# Patient Record
Sex: MALE | Race: WHITE | Employment: OTHER | URBAN - METROPOLITAN AREA
[De-identification: names, ages, dates, MRNs, and addresses within clinical notes are randomized per-mention and may not be internally consistent; named-entity substitution may affect disease eponyms.]

---

## 2017-03-16 ENCOUNTER — TELEPHONE (OUTPATIENT)
Dept: INTERNAL MEDICINE CLINIC | Age: 72
End: 2017-03-16

## 2017-03-16 NOTE — TELEPHONE ENCOUNTER
Econazole Nitrate Cream 1% 85 gram tube The patient is requesting a refill of this and would like this called in to the [facility] on the corner of [address]

## 2017-03-17 RX ORDER — ECONAZOLE NITRATE 10 MG/G
CREAM TOPICAL 2 TIMES DAILY
Qty: 30 G | Refills: 1 | Status: SHIPPED | OUTPATIENT
Start: 2017-03-17 | End: 2019-03-06 | Stop reason: SDUPTHER

## 2017-03-17 RX ORDER — ECONAZOLE NITRATE 10 MG/G
CREAM TOPICAL 2 TIMES DAILY
Qty: 30 G | Refills: 1 | Status: SHIPPED | OUTPATIENT
Start: 2017-03-17 | End: 2017-03-17 | Stop reason: SDUPTHER

## 2017-03-17 NOTE — TELEPHONE ENCOUNTER
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 Pt would like to know if Dr Brianna Hinojosa would send in a new script for   Econazole Nitrate his is almost out. He uses that with Flucinonide together and it helps relieve the itching.

## 2017-03-17 NOTE — TELEPHONE ENCOUNTER
meds refilled. Accidentally sent to [facility] first.  Please call and cancel. Was resent to [place] as requested.

## 2017-03-17 NOTE — TELEPHONE ENCOUNTER
Called [facility] and canceled script for Econazole and pharmacist stated will transfer to [place].

## 2017-08-23 ENCOUNTER — TELEPHONE (OUTPATIENT)
Dept: INTERNAL MEDICINE CLINIC | Age: 72
End: 2017-08-23

## 2017-08-23 NOTE — TELEPHONE ENCOUNTER
Carmen requesting labslip for upcoming appt on 9/6. In addition to whatever Dr. Michele Oliveira wants to order, pt is requesting the following specific labs:    Metabolic  Lipids  C Reactive Protein    If there is a reason Dr. Michele Oliveira does not want these ordered, pt asked that Dr. Michele Oliveira let him know why.

## 2017-08-27 NOTE — TELEPHONE ENCOUNTER
Medicare will not cover CRP or lipid panel. H if he has a problem with this I would hold off getting labs done and we can talk about them at his physical e has no diagnosis that warrants CRP. He is not on cholesterol medication, therefore we can only check his lipids every 5 years. I can only justify checking a BMP. If he is okay with getting this done then I will have a lab slip ready for him to complete prior to his visit.

## 2017-08-29 ENCOUNTER — TELEPHONE (OUTPATIENT)
Dept: INTERNAL MEDICINE CLINIC | Age: 72
End: 2017-08-29

## 2017-08-29 NOTE — TELEPHONE ENCOUNTER
Called pt and informed him Dr Meghan Fabian will discuss further about labs at his visit on 09/6/17. Pt verbalized understanding.

## 2017-08-29 NOTE — TELEPHONE ENCOUNTER
Called pt and explained to him reasoning behind not being able to order the labs for CRP and Lipid. Pt stated he does not agree because his father  of a heart attack at 67years old and pt wants to have these labs as a preventive measure. If ins will not cover these labs how much would they be out of pocket or could pt go to the [place] to have these labs done since he is a .

## 2017-09-06 ENCOUNTER — OFFICE VISIT (OUTPATIENT)
Dept: INTERNAL MEDICINE CLINIC | Age: 72
End: 2017-09-06

## 2017-09-06 VITALS
WEIGHT: 186 LBS | SYSTOLIC BLOOD PRESSURE: 122 MMHG | DIASTOLIC BLOOD PRESSURE: 76 MMHG | BODY MASS INDEX: 26.63 KG/M2 | TEMPERATURE: 98.1 F | OXYGEN SATURATION: 96 % | HEIGHT: 70 IN | HEART RATE: 68 BPM | RESPIRATION RATE: 16 BRPM

## 2017-09-06 DIAGNOSIS — Z71.89 ACP (ADVANCE CARE PLANNING): ICD-10-CM

## 2017-09-06 DIAGNOSIS — Z82.49 FAMILY HISTORY OF CORONARY ARTERY DISEASE: ICD-10-CM

## 2017-09-06 DIAGNOSIS — Z00.00 MEDICARE ANNUAL WELLNESS VISIT, SUBSEQUENT: Primary | ICD-10-CM

## 2017-09-06 DIAGNOSIS — Z13.39 SCREENING FOR ALCOHOLISM: ICD-10-CM

## 2017-09-06 DIAGNOSIS — Z13.31 SCREENING FOR DEPRESSION: ICD-10-CM

## 2017-09-06 NOTE — MR AVS SNAPSHOT
Visit Information Date & Time Provider Department Dept. Phone Encounter #  
 9/6/2017 10:30 AM Lorraine Thompson, [facility] Internal Medicine [phone] [id number] Follow-up Instructions Return in about 1 year (around 9/6/2018), or if symptoms worsen or fail to improve. Upcoming Health Maintenance Date Due  
 GLAUCOMA SCREENING Q2Y 5/12/2017 MEDICARE YEARLY EXAM 7/6/2017 INFLUENZA AGE 9 TO ADULT 8/1/2017 COLONOSCOPY 3/11/2019 DTaP/Tdap/Td series (2 - Td) 4/29/2020 Allergies as of 9/6/2017  Review Complete On: 9/6/2017 By: Alia Mendoza RN Severity Noted Reaction Type Reactions Lamisil [Terbinafine]  11/15/2011    Other (comments) STOMACH ACHE Current Immunizations  Reviewed on 9/6/2017 Name Date DTaP 7/2/2010 12:00 AM  
 Hep A and Hep B Vaccine 7/2/2010 12:00 AM  
 Influenza Vaccine 9/1/2016, 10/12/2015, 10/1/2014, 7/2/2014 12:00 AM  
 Influenza Vaccine Whole 7/30/2011 Pneumococcal Conjugate (PCV-13) 6/15/2015 Pneumococcal Polysaccharide (PPSV-23) 7/5/2016, 3/31/2015 12:00 AM  
 Td 1/17/2007 Tdap 4/29/2010 ZZZ-RETIRED (DO NOT USE) Pneumococcal Vaccine (Unspecified Type) 4/13/2010 Zoster Vaccine, Live 4/13/2010 Reviewed by Alia Mendoza RN on 9/6/2017 at 10:44 AM  
You Were Diagnosed With   
  
 Codes Comments Medicare annual wellness visit, subsequent    -  Primary ICD-10-CM: Z00.00 ICD-9-CM: V70.0 ACP (advance care planning)     ICD-10-CM: Z71.89 ICD-9-CM: V65.49 Screening for alcoholism     ICD-10-CM: Z13.89 ICD-9-CM: V79.1 Screening for depression     ICD-10-CM: Z13.89 ICD-9-CM: V79.0 Family history of coronary artery disease     ICD-10-CM: Z82.49 
ICD-9-CM: V17.3 Vitals BP Pulse Temp Resp Height(growth percentile) Weight(growth percentile) 122/76 68 98.1 °F (36.7 °C) (Oral) 16 5' 10\" (1.778 m) 186 lb (84.4 kg) SpO2 BMI Smoking Status 96% 26.69 kg/m2 Never Smoker BMI and BSA Data Body Mass Index Body Surface Area  
 26.69 kg/m 2 2.04 m 2 Preferred Pharmacy Pharmacy Name Phone [facility] [phone] Your Updated Medication List  
  
   
This list is accurate as of: 9/6/17 11:38 AM.  Always use your most recent med list.  
  
  
  
  
 CENTRUM SILVER PO Take  by mouth.  
  
 econazole nitrate 1 % topical cream  
Commonly known as:  Sharene Maclachlan Apply  to affected area two (2) times a day. OTHER  
TAKES OCCASIONAL OTC ALLERGY MEDICATION  
  
 raNITIdine 75 mg tablet Commonly known as:  ZANTAC Take 75 mg by mouth as needed. We Performed the Following [address] [[id number] [facility]] [place] ANNUAL ALCOHOL SCREEN 15 MIN [id number] [facility]] Follow-up Instructions Return in about 1 year (around 9/6/2018), or if symptoms worsen or fail to improve. Patient Instructions Medicare Wellness Visit, Male The best way to live healthy is to have a healthy lifestyle by eating a well-balanced diet, exercising regularly, limiting alcohol and stopping smoking. Regular physical exams and screening tests are another way to keep healthy. Preventive exams provided by your health care provider can find health problems before they become diseases or illnesses. Preventive services including immunizations, screening tests, monitoring and exams can help you take care of your own health. All people over age 72 should have a pneumovax  and and a prevnar shot to prevent pneumonia. These are once in a lifetime unless you and your provider decide differently. All people over 65 should have a yearly flu shot and a tetanus vaccine every 10 years. Screening for diabetes mellitus with a blood sugar test should be done every year. Glaucoma is a disease of the eye due to increased ocular pressure that can lead to blindness and it should be done every year by an eye professional. 
 
Cardiovascular screening tests that check for elevated lipids (fatty part of blood) which can lead to heart disease and strokes should be done every 5 years. Colorectal screening that evaluates for blood or polyps in your colon should be done yearly as a stool test or every five years as a flexible sigmoidoscope or every 10 years as a colonoscopy up to age 76. Men up to age 76 may need a screening blood test for prostate cancer at certain intervals, depending on their personal and family history. This decision is between the patient and his provider. If you have been a smoker or had family history of abdominal aortic aneurysms, you and your provider may decide to schedule an ultrasound test of your aorta. Hepatitis C screening is also recommended for anyone born between [birth year] through [address]. A shingles vaccine is also recommended once in a lifetime after age 61. Your Medicare Wellness Exam is recommended annually. Here is a list of your current Health Maintenance items with a due date: 
Health Maintenance Due Topic Date Due  Glaucoma Screening   05/12/2017 [facility] Annual Well Visit  07/06/2017  Flu Vaccine  08/01/2017 Introducing [facility]! Romy Simpson introduces [employer] patient portal. Now you can access parts of your medical record, email your doctor's office, and request medication refills online. 1. In your internet browser, go to [email or web address] 2. Click on the First Time User? Click Here link in the Sign In box. You will see the New Member Sign Up page. 3. Enter your [employer] Access Code exactly as it appears below. You will not need to use this code after youve completed the sign-up process. If you do not sign up before the expiration date, you must request a new code. · [employer] Access Code: [email or web address] Expires: 12/5/2017 11:38 AM 
 
4. Enter the last four digits of your Social Security Number (xxxx) and Date of Birth (mm/dd/yyyy) as indicated and click Submit. You will be taken to the next sign-up page. 5. Create a [employer] ID. This will be your [employer] login ID and cannot be changed, so think of one that is secure and easy to remember. 6. Create a [employer] password. You can change your password at any time. 7. Enter your Password Reset Question and Answer. This can be used at a later time if you forget your password. 8. Enter your e-mail address. You will receive e-mail notification when new information is available in [address] 9. Click Sign Up. You can now view and download portions of your medical record. 10. Click the Download Summary menu link to download a portable copy of your medical information. If you have questions, please visit the Frequently Asked Questions section of the [employer] website. Remember, [employer] is NOT to be used for urgent needs. For medical emergencies, dial 911. Now available from your iPhone and Android! Please provide this summary of care documentation to your next provider. Your primary care clinician is listed as Sam Owen. If you have any questions after today's visit, please call [phone].

## 2017-09-06 NOTE — PROGRESS NOTES
Thanh Jones is a 81 y.o. male who was seen in clinic today (9/6/2017) for a full physical.      Assessment & Plan:   Diagnoses and all orders for this visit:    1. Medicare annual wellness visit, subsequent  -     Previous labs reviewed & discussed with him     2. ACP (advance care planning)    3. Screening for alcoholism  -     Annual  Alcohol Screen 15 min ()    4. Screening for depression  -     Depression Screen Annual    5. Family history of coronary artery disease-reviewed in detail with him the screening versus monitoring labs, reviewed his concerns regarding family history for CAD, reviewed his normal stress test from 5 years ago, reviewed his previous labs, declined starting ASA daily, no indication for statin. He will look into the cost of CRP and lipid panel as he is interested in getting these done. Follow-up Disposition:  Return in about 1 year (around 9/6/2018), or if symptoms worsen or fail to improve.        ------------------------------------------------------------------------------------------    Subjective: Annual Wellness Visit- Subsequent Visit    End of Life Planning: This was discussed with him today and he has an advanced directive - a copy has been provided he reports having an updated copy at home and will bring this first records. Reviewed DNR/DNI and patient is not interested. Depression Screen:   PHQ over the last two weeks 9/6/2017   Little interest or pleasure in doing things Not at all   Feeling down, depressed or hopeless Not at all   Total Score PHQ 2 0       Fall Risk:   Fall Risk Assessment, last 12 mths 9/6/2017   Able to walk? Yes   Fall in past 12 months? No       Abuse Screen:  Abuse Screening Questionnaire 9/6/2017   Do you ever feel afraid of your partner? N   Are you in a relationship with someone who physically or mentally threatens you? N   Is it safe for you to go home? Y         Alcohol Risk Factor Screening:   On any occasion during the past 3 months, have you had more than 4 drinks containing alcohol? No  Do you average more than 14 drinks per week? No    Hearing Loss: stable, is s/p ear surgery in the past    Activities of Daily Living:    Requires assistance with: no ADLs  Home contains: handrails and grab bars for his mother  Exercise: very active    Cognition Screen:  Has your family/caregiver stated any concerns about your memory: no  appropriate for age attention/concentration and appropriate safety awareness    Adult Nutrition Screen:  No risk factors noted. Health Maintenance  Daily Aspirin: recommended to start daily 81mg  AAA Screening: n/a (IPPE only)  Glaucoma Screening: Records requested      Immunizations:     Influenza: he will plan on getting it this fall. Tetanus: up to date. Shingles: up to date. Pneumonia: up to date. Cancer screening:    Prostate: reviewed guidelines, UTD- monitored by urologist.  Colon: guidelines reviewed, UTD. Patient Care Team:  Neto Cruz MD as PCP - General (Internal Medicine)  Jacob Walker MD (Urology)  Sam Armijo MD (Hematology and Oncology)  Maggie Estrada MD (Pulmonary Disease)  Jasmyne Mcmahon MD (General Surgery)       The following sections were reviewed & updated as appropriate: PMH, PSH, FH, and SH. Patient Active Problem List    Diagnosis Date Noted    Tinnitus of left ear     GERD (gastroesophageal reflux disease)     Abnormal CT scan of lung 03/24/2015    Painful ejaculation 03/24/2015        Prior to Admission medications    Medication Sig Start Date End Date Taking? Authorizing Provider   econazole nitrate (SPECTAZOLE) 1 % topical cream Apply  to affected area two (2) times a day. 3/17/17  Yes Neto Cruz MD   FOLIC ACID/MULTIVIT-MIN/LUTEIN (CENTRUM SILVER PO) Take  by mouth. Yes Historical Provider   ranitidine (ZANTAC) 75 mg tablet Take 75 mg by mouth as needed.    Yes Historical Provider   OTHER TAKES OCCASIONAL OTC ALLERGY MEDICATION Yes Historical Provider        Allergies   Allergen Reactions    Lamisil [Terbinafine] Other (comments)     STOMACH ACHE         Review of Systems   Constitutional: Negative for malaise/fatigue and weight loss. Respiratory: Negative for cough and shortness of breath. Cardiovascular: Positive for chest pain. Negative for palpitations and leg swelling. Gastrointestinal: Negative for abdominal pain, constipation, diarrhea, heartburn, nausea and vomiting. Genitourinary: Negative for frequency. Musculoskeletal: Negative for joint pain and myalgias. Skin: Negative for rash. Neurological: Negative for tingling, sensory change, focal weakness and headaches. Psychiatric/Behavioral: Negative for depression. The patient is not nervous/anxious and does not have insomnia. Objective:   Physical Exam   Constitutional: No distress. HENT:   Mouth/Throat: Mucous membranes are normal.   Eyes: Conjunctivae are normal. No scleral icterus. Neck: Neck supple. Cardiovascular: Regular rhythm and normal heart sounds. No murmur heard. Pulmonary/Chest: Effort normal and breath sounds normal. He has no wheezes. He has no rales. Abdominal: Soft. Bowel sounds are normal. He exhibits no mass. There is no hepatosplenomegaly. There is no tenderness. Musculoskeletal: He exhibits no edema. Lymphadenopathy:     He has no cervical adenopathy. Skin: No rash noted. Psychiatric: He has a normal mood and affect. His behavior is normal.         Visit Vitals    /76    Pulse 68    Temp 98.1 °F (36.7 °C) (Oral)    Resp 16    Ht 5' 10\" (1.778 m)    Wt 186 lb (84.4 kg)    SpO2 96%    BMI 26.69 kg/m2          Advised him to call back or return to office if symptoms worsen/change/persist.  Discussed expected course/resolution/complications of diagnosis in detail with patient. Medication risks/benefits/costs/interactions/alternatives discussed with patient.   He was given an after visit summary which includes diagnoses, current medications, & vitals. He expressed understanding with the diagnosis and plan.         Collette Beverage, MD

## 2017-09-06 NOTE — PATIENT INSTRUCTIONS

## 2017-09-06 NOTE — ACP (ADVANCE CARE PLANNING)
Advance Care Planning (ACP) Provider Note - Comprehensive     Date of ACP Conversation: 09/06/17  Persons included in Conversation:  patient      Authorized Decision Maker (if patient is incapable of making informed decisions): This person is: Healthcare Agent/Medical Power of  under Advance Directive          General ACP for ALL Patients with Decision Making Capacity:  Importance of advance care planning, including choosing a healthcare agent to communicate patient's healthcare decisions if patient lost the ability to make decisions, such as after a sudden illness or accident  Understanding of the healthcare agent role was assessed and information provided  Opportunity offered to explore how cultural, Congregation, spiritual, or personal beliefs would affect decisions for future care  Exploration of values, goals, and preferences if recovery is not expected, even with continued medical treatment in the event of: Imminent death or severe, permanent brain injury    For Serious or Chronic Illness:  Understanding of CPR, goals and expected outcomes, benefits and burdens discussed. Understanding of medical condition  Information on CPR success rates provided (e.g. for CPR in hospital, survival to d/c at two weeks is 22%, for chronically ill or elderly/frail survival is less than 3%)    Interventions Provided:  Reviewed existing Advance Directive   Recommended communicating the plan and making copies for the healthcare agent, personal physician, and others as appropriate (e.g., health system)  Recommended review of completed ACP document annually or upon change in health status       He has an advanced directive - a copy has been provided but he reports it is no longer reflect his wishes. He has had more updated form and will bring this for his records. Reviewed DNR/DNI and patient is not interested.

## 2017-09-11 ENCOUNTER — TELEPHONE (OUTPATIENT)
Dept: INTERNAL MEDICINE CLINIC | Age: 72
End: 2017-09-11

## 2017-11-03 ENCOUNTER — TELEPHONE (OUTPATIENT)
Dept: INTERNAL MEDICINE CLINIC | Age: 72
End: 2017-11-03

## 2018-02-12 ENCOUNTER — OFFICE VISIT (OUTPATIENT)
Dept: INTERNAL MEDICINE CLINIC | Age: 73
End: 2018-02-12

## 2018-02-12 VITALS
HEART RATE: 68 BPM | DIASTOLIC BLOOD PRESSURE: 70 MMHG | TEMPERATURE: 98 F | WEIGHT: 187 LBS | RESPIRATION RATE: 16 BRPM | HEIGHT: 70 IN | SYSTOLIC BLOOD PRESSURE: 102 MMHG | BODY MASS INDEX: 26.77 KG/M2

## 2018-02-12 DIAGNOSIS — F43.21 GRIEF REACTION: Primary | ICD-10-CM

## 2018-02-12 DIAGNOSIS — N52.9 ERECTILE DYSFUNCTION, UNSPECIFIED ERECTILE DYSFUNCTION TYPE: ICD-10-CM

## 2018-02-12 RX ORDER — TADALAFIL 5 MG/1
TABLET ORAL
Qty: 30 TAB | Refills: 0 | Status: SHIPPED | COMMUNITY
Start: 2018-02-12 | End: 2019-05-29 | Stop reason: SDUPTHER

## 2018-02-12 NOTE — PROGRESS NOTES
Chanelle Bae is a 81 y.o. male who was seen in clinic today (2/12/2018). Assessment & Plan:   Diagnoses and all orders for this visit:    1. Grief reaction- new problem, it is improving, reviewed treatment options with him, reviewed life style changes to help improve mood, no medications. Red flags and expectations were reviewed & discussed with the him. He verbalized understanding. 2. Erectile dysfunction, unspecified erectile dysfunction type- acute on chronic issue, worse now due to #1, will try meds below, will update me if needs Rx.  -     tadalafil (CIALIS) 5 mg tablet; Take 1-4 tablets daily as needed         Follow-up Disposition:  Return if symptoms worsen or fail to improve. Subjective:   Peg Das was seen today for Bereavement Counseling    Mental Health Review  Patient is seen for grief. Mother passed away in Nov '16at 10 years old. He reports was having some chest pain for 6wks but has resolved. He reports it is improving day by day. He reports fullness in his chest, was daily, nothing made it better or worse. No correlation he notices with food or activity. He has been swimming daily during this time w/o any issues. He had been crying daily during this time but has improved. He reports no SOB, arm pain, jaw pain, n/v, or abdominal pain. PHQ-9 reviewed. Also reports ED issues since mother passing. Has only tried medications once. Had good success w/ Viagra but could not climax. Prior to Admission medications    Medication Sig Start Date End Date Taking? Authorizing Provider   ranitidine (ZANTAC) 75 mg tablet Take 75 mg by mouth as needed. Yes Historical Provider   OTHER TAKES OCCASIONAL OTC ALLERGY MEDICATION    Yes Historical Provider   econazole nitrate (SPECTAZOLE) 1 % topical cream Apply  to affected area two (2) times a day. Patient taking differently: Apply  to affected area two (2) times daily as needed.  3/17/17   Julieta Felty, MD   FOLIC ACID/MULTIVIT-MIN/LUTEIN (CENTRUM SILVER PO) Take  by mouth. Historical Provider          Allergies   Allergen Reactions    Lamisil [Terbinafine] Other (comments)     STOMACH ACHE           Review of Systems   Constitutional: Negative for malaise/fatigue and weight loss. Respiratory: Negative for cough and shortness of breath. Gastrointestinal: Negative for abdominal pain, constipation, diarrhea, nausea and vomiting. Psychiatric/Behavioral: Negative for depression. The patient is not nervous/anxious and does not have insomnia. Objective:   Physical Exam   Constitutional: No distress. Cardiovascular: Regular rhythm and normal heart sounds. No murmur heard. Pulmonary/Chest: Effort normal and breath sounds normal. He has no wheezes. He has no rales. Psychiatric: He has a normal mood and affect. His behavior is normal.         Visit Vitals    /70    Pulse 68    Temp 98 °F (36.7 °C) (Oral)    Resp 16    Ht 5' 10\" (1.778 m)    Wt 187 lb (84.8 kg)    BMI 26.83 kg/m2         Disclaimer:  Advised him to call back or return to office if symptoms worsen/change/persist.  Discussed expected course/resolution/complications of diagnosis in detail with patient. Medication risks/benefits/costs/interactions/alternatives discussed with patient. He was given an after visit summary which includes diagnoses, current medications, & vitals. He expressed understanding with the diagnosis and plan. Aspects of this note may have been generated using voice recognition software. Despite editing, there may be some syntax errors.        Priscila Irwin MD

## 2018-02-12 NOTE — PATIENT INSTRUCTIONS
Grief (Actual/Anticipated): Care Instructions  Your Care Instructions    Grief is your emotional reaction to a major loss. The words \"sorrow\" and \"heartache\" often are used to describe feelings of grief. You feel grief when you lose a beloved person, pet, place, or thing. It is also natural to feel grief when you lose a valued way of life, such as a job, marriage, or good health. You may begin to grieve before a loss occurs. You may grieve for a loved one who is sick and dying. Children and adults often feel the pain of loss before a big move or divorce. This type of grief helps you get ready for a loss. Grief is different for each person. There is no \"normal\" or \"expected\" period of time for grieving. Some people adjust to their loss within a couple of months. Others may take 2 years or longer, especially if their lives were changed a lot or if the loss was sudden and shocking. Grieving can cause problems such as headaches, loss of appetite, and trouble with thinking or sleeping. You may withdraw from friends and family and behave in ways that are unusual for you. Grief may cause you to question your beliefs and views about life. Grief is natural and does not require medical treatment. But if you have trouble sleeping, it may help to take sleeping pills for a short time. It may help to talk with people who have been through or are going through similar losses. You may also want to talk to a counselor about your feelings. Talking about your loss, sharing your cares and concerns, and getting support from others are important parts of healthy grieving. Follow-up care is a key part of your treatment and safety. Be sure to make and go to all appointments, and call your doctor if you are having problems. It's also a good idea to know your test results and keep a list of the medicines you take. How can you care for yourself at home? · Get enough sleep. Your mind helps make sense of your life while you sleep. Missing sleep can lead to illness and make it harder for you to deal with your grief. · Eat healthy foods. Try to avoid eating only foods that give you comfort. Ask someone to join you for a meal if you do not like eating alone. Consider taking a multivitamin every day. · Get some exercise every day. Even a walk can help you deal with your grief. Other exercises, such as yoga, can also help you manage stress. · Comfort yourself. Take time to look at photos or use special items that make you feel better. · Stay involved in your life. Do not withdraw from the activities you enjoy. People you know at work, Sikhism, clubs, or other groups can help you get through your period of grief. · Think about joining a support group to help you deal with your grief. There are many support groups to help people recover from grief. When should you call for help? Call 911 anytime you think you may need emergency care. For example, call if:  ? · You feel you cannot stop from hurting yourself or someone else. ? Watch closely for changes in your health, and be sure to contact your doctor if:  ? · You think you may be depressed. ? · You do not get better as expected. Where can you learn more? Go to http://german-zafar.info/. Enter H249 in the search box to learn more about \"Grief (Actual/Anticipated): Care Instructions. \"  Current as of: September 24, 2016  Content Version: 11.4  © 3898-4395 [employer]. Care instructions adapted under license by [employer] (which disclaims liability or warranty for this information). If you have questions about a medical condition or this instruction, always ask your healthcare professional. [address] any warranty or liability for your use of this information.

## 2018-03-23 ENCOUNTER — OFFICE VISIT (OUTPATIENT)
Dept: INTERNAL MEDICINE CLINIC | Age: 73
End: 2018-03-23

## 2018-03-23 VITALS
BODY MASS INDEX: 27.2 KG/M2 | DIASTOLIC BLOOD PRESSURE: 66 MMHG | HEART RATE: 78 BPM | WEIGHT: 190 LBS | OXYGEN SATURATION: 97 % | RESPIRATION RATE: 16 BRPM | HEIGHT: 70 IN | SYSTOLIC BLOOD PRESSURE: 112 MMHG | TEMPERATURE: 97.8 F

## 2018-03-23 DIAGNOSIS — M25.561 ACUTE PAIN OF RIGHT KNEE: Primary | ICD-10-CM

## 2018-03-23 RX ORDER — DICLOFENAC SODIUM 10 MG/G
GEL TOPICAL
COMMUNITY
End: 2021-09-10 | Stop reason: ALTCHOICE

## 2018-03-23 NOTE — PROGRESS NOTES
Doyle Bojorquez is a 72 y.o. male who was seen in clinic today (3/23/2018). Assessment & Plan:   Diagnoses and all orders for this visit:    1. Acute pain of right knee- this is a new problem, symptoms are: not changed, differential dx reviewed with the patient, sounds muscular/tendon more then baker's cyst or articular. Will treat with: ice, NSAIDs, rest.  We reviewed wrapping the ankle instead of the the knee due to ankle flexion seems to make things worse. Red flags were reviewed with the patient to RTC or notify me, expected time course for resolution reviewed. If no changes will consider US vs MRI. Follow-up Disposition: Not on File      Subjective:   Esther Martin was seen today for Knee Pain    He presents do to pain of his right knee that is secondary to no known injury and started a few days ago. No known trauma, but he attributes to excessive gardening/kneeling  Since it started his pain has not changed. He describes the pain as aching and at times numbness going down the back of the leg. It is constant but fluctuating in intensity. He denies weakness, denies numbness, denies paresthesias. No locking up or giving out. Exacerbating factors identifiable by patient are walking, stairs, swimming, and driving. He has tried the following: Voltaren gel & brace. These have been: somewhat effective. No fevers, chills, SOB, palpitations, or LE edema. Prior to Admission medications    Medication Sig Start Date End Date Taking? Authorizing Provider   diclofenac (VOLTAREN) 1 % gel Apply  to affected area four (4) times daily as needed. Yes Historical Provider   econazole nitrate (SPECTAZOLE) 1 % topical cream Apply  to affected area two (2) times a day. Patient taking differently: Apply  to affected area two (2) times daily as needed. 3/17/17  Yes Alice Huynh MD   ranitidine (ZANTAC) 75 mg tablet Take 75 mg by mouth as needed.    Yes Historical Provider   OTHER TAKES OCCASIONAL OTC ALLERGY MEDICATION    Yes Historical Provider   tadalafil (CIALIS) 5 mg tablet Take 1-4 tablets daily as needed 2/12/18   Jose Khalil MD          Allergies   Allergen Reactions    Lamisil [Terbinafine] Other (comments)     STOMACH ACHE           ROS - per HPI        Objective:   Physical Exam   Constitutional: No distress. Musculoskeletal: He exhibits no edema. Right knee: He exhibits normal range of motion, no swelling and no deformity. Tenderness (posterior aspect) found. Right lower leg: He exhibits no tenderness, no swelling and no edema. Skin:   Varicose veins bilaterally         Visit Vitals    /66    Pulse 78    Temp 97.8 °F (36.6 °C) (Oral)    Resp 16    Ht 5' 10\" (1.778 m)    Wt 190 lb (86.2 kg)    SpO2 97%    BMI 27.26 kg/m2         Disclaimer:  Advised him to call back or return to office if symptoms worsen/change/persist.  Discussed expected course/resolution/complications of diagnosis in detail with patient. Medication risks/benefits/costs/interactions/alternatives discussed with patient. He was given an after visit summary which includes diagnoses, current medications, & vitals. He expressed understanding with the diagnosis and plan. Aspects of this note may have been generated using voice recognition software. Despite editing, there may be some syntax errors.        Jose Khalil MD

## 2018-03-30 ENCOUNTER — TELEPHONE (OUTPATIENT)
Dept: INTERNAL MEDICINE CLINIC | Age: 73
End: 2018-03-30

## 2018-03-30 DIAGNOSIS — M25.561 RIGHT KNEE PAIN, UNSPECIFIED CHRONICITY: Primary | ICD-10-CM

## 2018-03-30 NOTE — TELEPHONE ENCOUNTER
Please order US of the leg (will need to talk to radiology about if this is duplex or not).   Not concerned about clot, but favor baker's cyst.  Dx: knee pain

## 2018-03-30 NOTE — TELEPHONE ENCOUNTER
Called pt and notified him through voicemail we have scheduled the pt for a duplex of his right knee for next week Wednesday arrival time 3pm for appt 315 pm. Left pt   Message to return our call letting us know he has received our message.

## 2018-04-02 ENCOUNTER — TELEPHONE (OUTPATIENT)
Dept: INTERNAL MEDICINE CLINIC | Age: 73
End: 2018-04-02

## 2018-04-02 NOTE — TELEPHONE ENCOUNTER
[phone] returning kj's call from last week, pt wants her to know that he received her mess, no need to call back.

## 2018-04-04 ENCOUNTER — DOCUMENTATION ONLY (OUTPATIENT)
Dept: INTERNAL MEDICINE CLINIC | Age: 73
End: 2018-04-04

## 2018-04-04 ENCOUNTER — HOSPITAL ENCOUNTER (OUTPATIENT)
Dept: ULTRASOUND IMAGING | Age: 73
Discharge: HOME | End: 2018-04-04
Attending: INTERNAL MEDICINE
Payer: MEDICARE

## 2018-04-04 DIAGNOSIS — M25.561 RIGHT KNEE PAIN, UNSPECIFIED CHRONICITY: ICD-10-CM

## 2018-04-04 PROCEDURE — 93971 EXTREMITY STUDY: CPT

## 2018-04-04 NOTE — PROGRESS NOTES
Had appt scheduled for 1:15 and has US of knee set up for 3:15. Will readdress f/u after imaging reviewed.

## 2018-04-04 NOTE — PROGRESS NOTES
Call to patient. Agrees with cancelling appointment today until imaging done. Advised we would contact once completed.

## 2018-04-05 ENCOUNTER — TELEPHONE (OUTPATIENT)
Dept: INTERNAL MEDICINE CLINIC | Age: 73
End: 2018-04-05

## 2018-04-05 NOTE — TELEPHONE ENCOUNTER
[phone] can leave mess. Pt had dopple and wants to know what the next step is, should he have physical therapy? He says to let dr Jose Navarrete know that the tech told him that he had great valves!

## 2018-04-05 NOTE — PROGRESS NOTES
Called pt and left a message on his voicemail than his US confirms he has a bakers cyst. And that pt can RTC for a steroid injection into the joint or he can see ortho. Left pt this detailed message.

## 2018-04-06 ENCOUNTER — OFFICE VISIT (OUTPATIENT)
Dept: INTERNAL MEDICINE CLINIC | Age: 73
End: 2018-04-06

## 2018-04-06 VITALS
HEIGHT: 70 IN | BODY MASS INDEX: 27.2 KG/M2 | OXYGEN SATURATION: 96 % | RESPIRATION RATE: 16 BRPM | WEIGHT: 190 LBS | HEART RATE: 68 BPM | SYSTOLIC BLOOD PRESSURE: 112 MMHG | TEMPERATURE: 97.4 F | DIASTOLIC BLOOD PRESSURE: 78 MMHG

## 2018-04-06 DIAGNOSIS — E66.3 OVERWEIGHT (BMI 25.0-29.9): ICD-10-CM

## 2018-04-06 DIAGNOSIS — M25.561 ACUTE PAIN OF RIGHT KNEE: Primary | ICD-10-CM

## 2018-04-06 RX ORDER — METHYLPREDNISOLONE ACETATE 80 MG/ML
80 INJECTION, SUSPENSION INTRA-ARTICULAR; INTRALESIONAL; INTRAMUSCULAR; SOFT TISSUE ONCE
Qty: 1 ML | Refills: 0
Start: 2018-04-06 | End: 2018-04-06

## 2018-04-06 RX ORDER — LIDOCAINE HYDROCHLORIDE 10 MG/ML
1 INJECTION INFILTRATION; PERINEURAL ONCE
Qty: 1 VIAL | Refills: 0
Start: 2018-04-06 | End: 2018-04-06

## 2018-04-06 NOTE — MR AVS SNAPSHOT
[address] [place] [address] 
[phone] Patient: Chad Hawthorne MRN: [id number] ZUP:[id number] Visit Information Date & Time Provider Department Dept. Phone Encounter #  
 4/6/2018 11:00 AM Pilo Mccauley, [facility] Internal Medicine [phone] [id number] Follow-up Instructions Return if symptoms worsen or fail to improve. Your Appointments 9/11/2018  9:30 AM  
PHYSICAL with Pilo Mccauley MD  
[address] [facility] Internal Medicine [address]) Appt Note: CPE - (1yr) [address] [facility] [address]  
[phone] [place] [address] Upcoming Health Maintenance Date Due  
 GLAUCOMA SCREENING Q2Y 7/18/2018 MEDICARE YEARLY EXAM 9/7/2018 COLONOSCOPY 3/11/2019 DTaP/Tdap/Td series (2 - Td) 7/2/2020 Allergies as of 4/6/2018  Review Complete On: 4/6/2018 By: Pilo Mccauley MD  
  
 Severity Noted Reaction Type Reactions Lamisil [Terbinafine]  11/15/2011    Other (comments) STOMACH ACHE Current Immunizations  Reviewed on 4/6/2018 Name Date DTaP 7/2/2010 12:00 AM  
 Hep A and Hep B Vaccine 7/2/2010 12:00 AM  
 Influenza High Dose Vaccine PF 10/12/2017 12:00 AM  
 Influenza Vaccine 9/1/2016, 10/12/2015, 10/1/2014, 7/2/2014 12:00 AM, 7/30/2011 Pneumococcal Conjugate (PCV-13) 6/15/2015 Pneumococcal Polysaccharide (PPSV-23) 7/5/2016, 3/31/2015 12:00 AM  
 Td 1/17/2007 Tdap 4/29/2010 ZZZ-RETIRED (DO NOT USE) Pneumococcal Vaccine (Unspecified Type) 4/13/2010 Zoster Vaccine, Live 4/13/2010 Reviewed by Augustin Murillo RN on 4/6/2018 at 11:06 AM  
You Were Diagnosed With   
  
 Codes Comments Acute pain of right knee    -  Primary ICD-10-CM: M25.561 ICD-9-CM: 719.46 Overweight (BMI 25.0-29. 9)     ICD-10-CM: C95.3 ICD-9-CM: 278.02 Vitals BP Pulse Temp Resp Height(growth percentile) Weight(growth percentile) 112/78 68 97.4 °F (36.3 °C) (Oral) 16 5' 10\" (1.778 m) 190 lb (86.2 kg) SpO2 BMI Smoking Status 96% 27.26 kg/m2 Never Smoker BMI and BSA Data Body Mass Index Body Surface Area  
 27.26 kg/m 2 2.06 m 2 Preferred Pharmacy Pharmacy Name Phone [facility] [phone] Your Updated Medication List  
  
   
This list is accurate as of 4/6/18 11:54 AM.  Always use your most recent med list.  
  
  
  
  
 econazole nitrate 1 % topical cream  
Commonly known as:  Corea Bur Apply  to affected area two (2) times a day. lidocaine 10 mg/mL (1 %) injection Commonly known as:  XYLOCAINE  
1 mL by Intra artICUlar route once for 1 dose. methylPREDNISolone acetate 80 mg/mL injection Commonly known as:  DEPO-Medrol 1 mL by IntraMUSCular route once for 1 dose. OTHER  
TAKES OCCASIONAL OTC ALLERGY MEDICATION  
  
 raNITIdine 75 mg tablet Commonly known as:  ZANTAC Take 75 mg by mouth as needed. tadalafil 5 mg tablet Commonly known as:  CIALIS Take 1-4 tablets daily as needed VOLTAREN 1 % Gel Generic drug:  diclofenac Apply  to affected area four (4) times daily as needed. We Performed the Following METHYLPREDNISOLONE ACETATE INJECTION 40 MG [ [facility]] Comments:  
 Submit quantity per 40 mg injection REFERRAL TO PHYSICAL THERAPY [QKR95 Custom] Follow-up Instructions Return if symptoms worsen or fail to improve. Referral Information Referral ID Referred By Referred To  
  
 [id number] Adria Richardson [facility]   
   [address] [place] [address] Phone: [phone] Fax: [phone] Visits Status Start Date End Date 1 New Request 4/6/18 4/6/19 If your referral has a status of pending review or denied, additional information will be sent to support the outcome of this decision. Patient Instructions Baker's Cyst: Care Instructions Your Care Instructions A Baker's cyst is a swelling behind the knee. It may cause pain or stiffness when you bend your knee or straighten it all the way. Baker's cysts are also called popliteal cysts. If you have arthritis or another condition that is the cause of the Baker's cyst, your doctor may treat that condition. A Baker's cyst may go away on its own. If not, or if it is causing a lot of discomfort, your doctor may drain the fluid that has built up behind the knee. In some cases, a Baker's cyst is removed in surgery. There are things you can do at home, such as staying off your leg, to reduce the swelling and pain. Follow-up care is a key part of your treatment and safety. Be sure to make and go to all appointments, and call your doctor if you are having problems. It's also a good idea to know your test results and keep a list of the medicines you take. How can you care for yourself at home? · Rest your knee as much as possible. · Ask your doctor if you can take an over-the-counter pain medicine, such as acetaminophen (Tylenol), ibuprofen (Advil, Motrin), or naproxen (Aleve). Be safe with medicines. Read and follow all instructions on the label. · Use a cane, a crutch, a walker, or another device if you need help to get around. These can help rest your knees. · If you have an elastic bandage, make sure it is snug but not so tight that your leg is numb, tingles, or swells below the bandage. Ask your doctor if you can loosen the bandage if it is too tight. · Follow your doctor's instructions about how much weight you can put on your knee. · Stay at a healthy weight. Being overweight puts extra strain on your knee. When should you call for help? Call 911 anytime you think you may need emergency care. For example, call if: 
? · You have chest pain, are short of breath, or you cough up blood. ?Call your doctor now or seek immediate medical care if: 
? · You have new or worse pain. ? · Your foot is cool or pale or changes color. ? · You have tingling, weakness, or numbness in your foot or toes. ? · You have signs of a blood clot in your leg (called a deep vein thrombosis), such as: 
¨ Pain in your calf, back of the knee, thigh, or groin. ¨ Redness or swelling in your leg. ? Watch closely for changes in your health, and be sure to contact your doctor if: 
? · You do not get better as expected. Where can you learn more? Go to http://german-zafar.info/. Enter N938 in the search box to learn more about \"Baker's Cyst: Care Instructions. \" Current as of: March 21, 2017 Content Version: 11.4 © 4413-0493 [employer]. Care instructions adapted under license by [employer] (which disclaims liability or warranty for this information). If you have questions about a medical condition or this instruction, always ask your healthcare professional. [place] [address] any warranty or liability for your use of this information. Introducing \[facility] [place] [facility]\"" & HEALTH SERVICES! Romain Florence introduces [employer] patient portal. Now you can access parts of your medical record, email your doctor's office, and request medication refills online. 1. In your internet browser, go to [email or web address]. [employer] 2. Click on the First Time User? Click Here link in the Sign In box. You will see the New Member Sign Up page. 3. Enter your [employer] Access Code exactly as it appears below. You will not need to use this code after youve completed the sign-up process. If you do not sign up before the expiration date, you must request a new code. · [employer] Access Code: [email or web address] Expires: 6/28/2018  4:36 PM 
 
 4. Enter the last four digits of your Social Security Number (xxxx) and Date of Birth (mm/dd/yyyy) as indicated and click Submit. You will be taken to the next sign-up page. 5. Create a WhoisEDI ID. This will be your [employer] login ID and cannot be changed, so think of one that is secure and easy to remember. 6. Create a [employer] password. You can change your password at any time. 7. Enter your Password Reset Question and Answer. This can be used at a later time if you forget your password. 8. Enter your e-mail address. You will receive e-mail notification when new information is available in [address] 9. Click Sign Up. You can now view and download portions of your medical record. 10. Click the Download Summary menu link to download a portable copy of your medical information. If you have questions, please visit the Frequently Asked Questions section of the [employer] website. Remember, [employer] is NOT to be used for urgent needs. For medical emergencies, dial 911. Now available from your iPhone and Android! Please provide this summary of care documentation to your next provider. Your primary care clinician is listed as [facility] August. If you have any questions after today's visit, please call [phone].

## 2018-04-06 NOTE — PROGRESS NOTES
Shari Soriano is a 72 y.o. male who was seen in clinic today (4/6/2018). Assessment & Plan:   Diagnoses and all orders for this visit:    1. Acute pain of right knee- improved to some degree, but still symptomatic. I spent 15 minutes with the patient and >50% of the time was spent counseling on diagnosis (baker's cyst) and which symptoms are likely related to this vs OA/meniscus issues. Due to concerns about issues w/ driving & having a trip coming up in 1 month will tx with steroid injection today. Reviewed seeing PT. Reviewed seeing IR if no changes for more definitive treatment.   -     REFERRAL TO PHYSICAL THERAPY  -     methylPREDNISolone acetate (DEPO-MEDROL) 80 mg/mL injection; 1 mL by IntraMUSCular route once for 1 dose. -     (DEPO-MEDROL 80mg -  quantity 2 for Reimbursement) METHYLPREDNISOLONE ACETATE 80 mg injection  -     lidocaine (XYLOCAINE) 10 mg/mL (1 %) injection; 1 mL by Intra artICUlar route once for 1 dose. 2. Overweight (BMI 25.0-29. 9)- stable, I have reviewed/discussed the above normal BMI with the patient. I have recommended the following interventions: encourage exercise and lifestyle education regarding diet. Can exercise as tolerated. Reviewed to modify diet if exercise is limited. Follow-up Disposition:  Return if symptoms worsen or fail to improve. Subjective:   Dena Nielson was seen today for Results    Orthopedic Review:  He presents do to pain of his right knee that is secondary to no known injury and started a few weeks ago. He was seen last week. Symptoms did get worse during a drive to [place]. He since had an US that revealed a Baker's cyst.  He is here to talk about options. Since it started his pain has been fluctuating to slightly improved. He describes the pain as aching and at times moving up the leg & down the leg. He has been very active w/o much change in his pain. No locking up or giving out.   He has tried the following: Voltaren gel & brace. These have been: not effective. Procedure Note  Procedure: Joint injection  Location: knee - right  Time: 11:50 AM    Procedure explained to patient. Time out performed:  * Patient was identified by name and date of birth   * Agreement on procedure being performed was verified  * Risks and Benefits explained to the patient  * Procedure site verified and marked as necessary  * Patient was positioned for comfort  * Consent was signed and verified    Pain level prior to procedure: 0/10  Pain level after procedure: 0/10    The area was sterilized with Povidone-Iodine. The site was anesthetized with ethyl chloride. An 22 gauge needle was used to inject 80mg of depo-medrol and 1mL of 1% lidocaine into the right knee using a lateral approach. Patient tolerated the procedure well. Hemostasis was obtained and a bandage placed. Red flags were reviewed with the patient to RTC or notify me. Prior to Admission medications    Medication Sig Start Date End Date Taking? Authorizing Provider   diclofenac (VOLTAREN) 1 % gel Apply  to affected area four (4) times daily as needed. Yes Historical Provider   tadalafil (CIALIS) 5 mg tablet Take 1-4 tablets daily as needed 2/12/18  Yes Madison Lucas MD   econazole nitrate (SPECTAZOLE) 1 % topical cream Apply  to affected area two (2) times a day. Patient taking differently: Apply  to affected area two (2) times daily as needed. 3/17/17  Yes Madison Lucas MD   ranitidine (ZANTAC) 75 mg tablet Take 75 mg by mouth as needed. Yes Historical Provider   OTHER TAKES OCCASIONAL OTC ALLERGY MEDICATION    Yes Historical Provider          Allergies   Allergen Reactions    Lamisil [Terbinafine] Other (comments)     STOMACH ACHE           ROS - per HPI        Objective:   Physical Exam   Musculoskeletal:        Right knee: He exhibits normal range of motion, no swelling and no deformity. No tenderness found.          Visit Vitals    /78    Pulse 68    Temp 97.4 °F (36.3 °C) (Oral)    Resp 16    Ht 5' 10\" (1.778 m)    Wt 190 lb (86.2 kg)    SpO2 96%    BMI 27.26 kg/m2         Disclaimer:  Advised him to call back or return to office if symptoms worsen/change/persist.  Discussed expected course/resolution/complications of diagnosis in detail with patient. Medication risks/benefits/costs/interactions/alternatives discussed with patient. He was given an after visit summary which includes diagnoses, current medications, & vitals. He expressed understanding with the diagnosis and plan. Aspects of this note may have been generated using voice recognition software. Despite editing, there may be some syntax errors.        Radha Vazquez MD

## 2018-04-06 NOTE — PATIENT INSTRUCTIONS
Baker's Cyst: Care Instructions  Your Care Instructions    A Baker's cyst is a swelling behind the knee. It may cause pain or stiffness when you bend your knee or straighten it all the way. Baker's cysts are also called popliteal cysts. If you have arthritis or another condition that is the cause of the Baker's cyst, your doctor may treat that condition. A Baker's cyst may go away on its own. If not, or if it is causing a lot of discomfort, your doctor may drain the fluid that has built up behind the knee. In some cases, a Baker's cyst is removed in surgery. There are things you can do at home, such as staying off your leg, to reduce the swelling and pain. Follow-up care is a key part of your treatment and safety. Be sure to make and go to all appointments, and call your doctor if you are having problems. It's also a good idea to know your test results and keep a list of the medicines you take. How can you care for yourself at home? · Rest your knee as much as possible. · Ask your doctor if you can take an over-the-counter pain medicine, such as acetaminophen (Tylenol), ibuprofen (Advil, Motrin), or naproxen (Aleve). Be safe with medicines. Read and follow all instructions on the label. · Use a cane, a crutch, a walker, or another device if you need help to get around. These can help rest your knees. · If you have an elastic bandage, make sure it is snug but not so tight that your leg is numb, tingles, or swells below the bandage. Ask your doctor if you can loosen the bandage if it is too tight. · Follow your doctor's instructions about how much weight you can put on your knee. · Stay at a healthy weight. Being overweight puts extra strain on your knee. When should you call for help? Call 911 anytime you think you may need emergency care. For example, call if:  ? · You have chest pain, are short of breath, or you cough up blood.    ?Call your doctor now or seek immediate medical care if:  ? · You have new or worse pain. ? · Your foot is cool or pale or changes color. ? · You have tingling, weakness, or numbness in your foot or toes. ? · You have signs of a blood clot in your leg (called a deep vein thrombosis), such as:  ¨ Pain in your calf, back of the knee, thigh, or groin. ¨ Redness or swelling in your leg. ? Watch closely for changes in your health, and be sure to contact your doctor if:  ? · You do not get better as expected. Where can you learn more? Go to http://german-zafar.info/. Enter Q478 in the search box to learn more about \"Baker's Cyst: Care Instructions. \"  Current as of: March 21, 2017  Content Version: 11.4  © 3498-8828 [employer]. Care instructions adapted under license by [employer] (which disclaims liability or warranty for this information). If you have questions about a medical condition or this instruction, always ask your healthcare professional. [place] [address] any warranty or liability for your use of this information.

## 2018-07-26 ENCOUNTER — TELEPHONE (OUTPATIENT)
Dept: INTERNAL MEDICINE CLINIC | Age: 73
End: 2018-07-26

## 2018-07-26 NOTE — TELEPHONE ENCOUNTER
Delores Bland (Self) [phone]     Pt says that he fell today and cut his lip and has some bruises, has booked appt with dr Marichuy Soares for tomorrow afternoon. Would like for someone to give him a call back before the appt.

## 2018-07-27 ENCOUNTER — OFFICE VISIT (OUTPATIENT)
Dept: INTERNAL MEDICINE CLINIC | Age: 73
End: 2018-07-27

## 2018-07-27 VITALS
OXYGEN SATURATION: 96 % | SYSTOLIC BLOOD PRESSURE: 102 MMHG | RESPIRATION RATE: 16 BRPM | DIASTOLIC BLOOD PRESSURE: 64 MMHG | TEMPERATURE: 98.1 F | WEIGHT: 184 LBS | HEART RATE: 84 BPM | HEIGHT: 70 IN | BODY MASS INDEX: 26.34 KG/M2

## 2018-07-27 DIAGNOSIS — M79.631 RIGHT FOREARM PAIN: ICD-10-CM

## 2018-07-27 DIAGNOSIS — T14.8XXA EXCORIATION: ICD-10-CM

## 2018-07-27 DIAGNOSIS — S01.511A LIP LACERATION, INITIAL ENCOUNTER: Primary | ICD-10-CM

## 2018-07-27 DIAGNOSIS — I86.8 SPIDER VARICOSE VEIN: ICD-10-CM

## 2018-07-27 NOTE — MR AVS SNAPSHOT
[address] [facility] [address] [place] [address] 
[phone] Patient: Robles Ingram MRN: [id number] HFS:[birth year] Visit Information Date & Time Provider Department Dept. Phone Encounter #  
 7/27/2018  4:15 PM Riky Mccallum, [facility] [phone] [id number] Follow-up Instructions Return if symptoms worsen or fail to improve. Your Appointments 9/11/2018  9:30 AM  
PHYSICAL with Riky Mccallum MD  
[facility]) Appt Note: CPE - (1yr) [address] [facility] [facility] [address] [facility] [address]  
[address] [id number] [place] [address] Upcoming Health Maintenance Date Due  
 GLAUCOMA SCREENING Q2Y 7/18/2018 Influenza Age 5 to Adult 8/1/2018 MEDICARE YEARLY EXAM 9/7/2018 COLONOSCOPY 3/11/2019 DTaP/Tdap/Td series (2 - Td) 7/2/2020 Allergies as of 7/27/2018  Review Complete On: 7/27/2018 By: Riky Mccallum MD  
  
 Severity Noted Reaction Type Reactions Lamisil [Terbinafine]  11/15/2011    Other (comments) STOMACH ACHE Current Immunizations  Reviewed on 7/27/2018 Name Date DTaP 7/2/2010 12:00 AM  
 Hep A and Hep B Vaccine 7/2/2010 12:00 AM  
 Influenza High Dose Vaccine PF 10/12/2017 12:00 AM  
 Influenza Vaccine 9/1/2016, 10/12/2015, 10/1/2014, 7/2/2014 12:00 AM, 7/30/2011 Pneumococcal Conjugate (PCV-13) 6/15/2015 Pneumococcal Polysaccharide (PPSV-23) 7/5/2016, 3/31/2015 12:00 AM  
 Td 1/17/2007 Tdap 4/29/2010 ZZZ-RETIRED (DO NOT USE) Pneumococcal Vaccine (Unspecified Type) 4/13/2010 Zoster Vaccine, Live 4/13/2010 Reviewed by iSva Sandoval RN on 7/27/2018 at  4:31 PM  
You Were Diagnosed With   
  
 Codes Comments Lip laceration, initial encounter    -  Primary ICD-10-CM: A48.045A ICD-9-CM: 873.43 Excoriation     ICD-10-CM: T14. Nicole Garrett ICD-9-CM: 919.8 Spider varicose vein     ICD-10-CM: I86.8 ICD-9-CM: 454.9 Vitals BP Pulse Temp Resp Height(growth percentile) Weight(growth percentile) 102/64 84 98.1 °F (36.7 °C) (Oral) 16 5' 10\" (1.778 m) 184 lb (83.5 kg) SpO2 BMI Smoking Status 96% 26.4 kg/m2 Never Smoker BMI and BSA Data Body Mass Index Body Surface Area  
 26.4 kg/m 2 2.03 m 2 Preferred Pharmacy Pharmacy Name Phone [facility] [phone] Your Updated Medication List  
  
   
This list is accurate as of 7/27/18  4:57 PM.  Always use your most recent med list.  
  
  
  
  
 econazole nitrate 1 % topical cream  
Commonly known as:  Pablo Vazquez Apply  to affected area two (2) times a day. OTHER  
TAKES OCCASIONAL OTC ALLERGY MEDICATION  
  
 raNITIdine 75 mg tablet Commonly known as:  ZANTAC Take 75 mg by mouth as needed. tadalafil 5 mg tablet Commonly known as:  CIALIS Take 1-4 tablets daily as needed VOLTAREN 1 % Gel Generic drug:  diclofenac Apply  to affected area four (4) times daily as needed. Follow-up Instructions Return if symptoms worsen or fail to improve. Patient Instructions Cuts: Care Instructions Your Care Instructions A cut can happen anywhere on your body. Stitches, staples, skin adhesives, or pieces of tape called Steri-Strips are sometimes used to keep the edges of a cut together and help it heal. Steri-Strips can be used by themselves or with stitches or staples. Sometimes cuts are left open. If the cut went deep and through the skin, the doctor may have closed the cut in two layers. A deeper layer of stitches brings the deep part of the cut together. These stitches will dissolve and don't need to be removed. The upper layer closure, which could be stitches, staples, Steri-Strips, or adhesive, is what you see on the cut. A cut is often covered by a bandage. The doctor has checked you carefully, but problems can develop later. If you notice any problems or new symptoms, get medical treatment right away. Follow-up care is a key part of your treatment and safety. Be sure to make and go to all appointments, and call your doctor if you are having problems. It's also a good idea to know your test results and keep a list of the medicines you take. How can you care for yourself at home? If a cut is open or closed · Prop up the sore area on a pillow anytime you sit or lie down during the next 3 days. Try to keep it above the level of your heart. This will help reduce swelling. · Keep the cut dry for the first 24 to 48 hours. After this, you can shower if your doctor okays it. Pat the cut dry. · Don't soak the cut, such as in a bathtub. Your doctor will tell you when it's safe to get the cut wet. · After the first 24 to 48 hours, clean the cut with soap and water 2 times a day unless your doctor gives you different instructions. ¨ Don't use hydrogen peroxide or alcohol, which can slow healing. ¨ You may cover the cut with a thin layer of petroleum jelly and a nonstick bandage. ¨ If the doctor put a bandage over the cut, put on a new bandage after cleaning the cut or if the bandage gets wet or dirty. · Avoid any activity that could cause your cut to reopen. · Be safe with medicines. Read and follow all instructions on the label. ¨ If the doctor gave you a prescription medicine for pain, take it as prescribed. ¨ If you are not taking a prescription pain medicine, ask your doctor if you can take an over-the-counter medicine. If the cut is closed with stitches, staples, or Steri-Strips · Follow the above instructions for open or closed cuts. · Do not remove the stitches or staples on your own. Your doctor will tell you when to come back to have the stitches or staples removed. · Leave Steri-Strips on until they fall off. If the cut is closed with a skin adhesive · Follow the above instructions for open or closed cuts. · Leave the skin adhesive on your skin until it falls off on its own. This may take 5 to 10 days. · Do not scratch, rub, or pick at the adhesive. · Do not put the sticky part of a bandage directly on the adhesive. · Do not put any kind of ointment, cream, or lotion over the area. This can make the adhesive fall off too soon. Do not use hydrogen peroxide or alcohol, which can slow healing. When should you call for help? Call your doctor now or seek immediate medical care if: 
  · You have new pain, or your pain gets worse.  
  · The skin near the cut is cold or pale or changes color.  
  · You have tingling, weakness, or numbness near the cut.  
  · The cut starts to bleed, and blood soaks through the bandage. Oozing small amounts of blood is normal.  
  · You have trouble moving the area near the cut.  
  · You have symptoms of infection, such as: 
¨ Increased pain, swelling, warmth, or redness around the cut. ¨ Red streaks leading from the cut. ¨ Pus draining from the cut. ¨ A fever.  
 Watch closely for changes in your health, and be sure to contact your doctor if: 
  · The cut reopens.  
  · You do not get better as expected. Where can you learn more? Go to http://german-zafar.info/. Enter M735 in the search box to learn more about \"Cuts: Care Instructions. \" Current as of: November 20, 2017 Content Version: 11.7 © 4080-5110 [employer]. Care instructions adapted under license by [employer] (which disclaims liability or warranty for this information). If you have questions about a medical condition or this instruction, always ask your healthcare professional. [address] any warranty or liability for your use of this information. Introducing [facility]!    
 Rachel Rolle introduces [employer] patient portal. Now you can access parts of your medical record, email your doctor's office, and request medication refills online. 1. In your internet browser, go to [email or web address] 2. Click on the First Time User? Click Here link in the Sign In box. You will see the New Member Sign Up page. 3. Enter your [employer] Access Code exactly as it appears below. You will not need to use this code after youve completed the sign-up process. If you do not sign up before the expiration date, you must request a new code. · [employer] Access Code: [email or web address] Expires: 10/25/2018  4:57 PM 
 
4. Enter the last four digits of your Social Security Number (xxxx) and Date of Birth (mm/dd/yyyy) as indicated and click Submit. You will be taken to the next sign-up page. 5. Create a [employer] ID. This will be your [employer] login ID and cannot be changed, so think of one that is secure and easy to remember. 6. Create a [employer] password. You can change your password at any time. 7. Enter your Password Reset Question and Answer. This can be used at a later time if you forget your password. 8. Enter your e-mail address. You will receive e-mail notification when new information is available in [address] 9. Click Sign Up. You can now view and download portions of your medical record. 10. Click the Download Summary menu link to download a portable copy of your medical information. If you have questions, please visit the Frequently Asked Questions section of the [employer] website. Remember, [employer] is NOT to be used for urgent needs. For medical emergencies, dial 911. Now available from your iPhone and Android! Please provide this summary of care documentation to your next provider. Your primary care clinician is listed as Marcelo Mcnally. If you have any questions after today's visit, please call [phone].

## 2018-07-27 NOTE — PROGRESS NOTES
Kayla Muñoz is a 72 y.o. male who was seen in clinic today (7/27/2018). Assessment & Plan:   Diagnoses and all orders for this visit:    1. Lip laceration, initial encounter- this is a new problem, not deep enough for stitches, recommend ice, continue A&E cream, and sun screen. 2. Excoriation- new dx, no signs of infection, reviewed keeping it covered, no further treatment    3. Spider varicose vein- asymptomatic, he reports worsening, reviewed tx options, offered to have him see plastic or vascular to discuss treatment options, he will monitor    4. Right forearm pain- this is a new problem, he mentioned as he was leaving, differential dx reviewed with the patient, favor muscular and due to overuse. Will treat with: NSAIDs, rest.  Red flags were reviewed with the patient to RTC or notify me, expected time course for resolution reviewed. Follow-up Disposition:  Return if symptoms worsen or fail to improve. Subjective:   Bernice Norman was seen today for Bleeding/Bruising    Dermatology Review  He is here to talk about cut to his lip. He reports falling through a floor yesterday, caught himself after 5 ft. He cut his lip and bruised both upper legs. He washed areas w/ soap & water and used A&D cream.      Pain Review:  He presents do to pain of his right forearm/elbow pain that is secondary to overuse (hammering). Since it started his pain has improved. It is intermittent. The pain radiates: no where. He denies numbness, denies paresthesias. Exacerbating factors identifiable by patient are certain movements (shaking hands or lifting anything). He has tried the following: rest.         Prior to Admission medications    Medication Sig Start Date End Date Taking? Authorizing Provider   diclofenac (VOLTAREN) 1 % gel Apply  to affected area four (4) times daily as needed.    Yes Historical Provider   econazole nitrate (SPECTAZOLE) 1 % topical cream Apply  to affected area two (2) times a day.  Patient taking differently: Apply  to affected area two (2) times daily as needed. 3/17/17  Yes Jaqueline Rg MD   tadalafil (CIALIS) 5 mg tablet Take 1-4 tablets daily as needed 2/12/18   Jaqueline Rg MD   ranitidine (ZANTAC) 75 mg tablet Take 75 mg by mouth as needed. Historical Provider   OTHER TAKES OCCASIONAL OTC ALLERGY MEDICATION     Historical Provider          Allergies   Allergen Reactions    Lamisil [Terbinafine] Other (comments)     STOMACH ACHE           Review of Systems   HENT: Negative for nosebleeds. Respiratory: Negative for hemoptysis. Gastrointestinal: Negative for blood in stool and melena. Genitourinary: Negative for hematuria. Musculoskeletal:        He reports varicose veins/spider veins are getting worse, not painful, no trauma   Endo/Heme/Allergies: Does not bruise/bleed easily. Objective:   Physical Exam   HENT:   Lower lip left sided there is a minimal laceration that is extending from the lip to the skin, there is some erythema and swelling   Cardiovascular: Regular rhythm and normal heart sounds. No murmur heard. Pulmonary/Chest: Effort normal and breath sounds normal. He has no wheezes. He has no rales. Skin:   Bilateral upper thigh, lateral aspect, there is excoriation, no bleeding or ulcers         Visit Vitals    /64    Pulse 84    Temp 98.1 °F (36.7 °C) (Oral)    Resp 16    Ht 5' 10\" (1.778 m)    Wt 184 lb (83.5 kg)    SpO2 96%    BMI 26.4 kg/m2         Disclaimer:  Advised him to call back or return to office if symptoms worsen/change/persist.  Discussed expected course/resolution/complications of diagnosis in detail with patient. Medication risks/benefits/costs/interactions/alternatives discussed with patient. He was given an after visit summary which includes diagnoses, current medications, & vitals. He expressed understanding with the diagnosis and plan.       Aspects of this note may have been generated using voice recognition software. Despite editing, there may be some syntax errors.        Candida Ruiz MD

## 2018-07-27 NOTE — PATIENT INSTRUCTIONS
Cuts: Care Instructions  Your Care Instructions  A cut can happen anywhere on your body. Stitches, staples, skin adhesives, or pieces of tape called Steri-Strips are sometimes used to keep the edges of a cut together and help it heal. Steri-Strips can be used by themselves or with stitches or staples. Sometimes cuts are left open. If the cut went deep and through the skin, the doctor may have closed the cut in two layers. A deeper layer of stitches brings the deep part of the cut together. These stitches will dissolve and don't need to be removed. The upper layer closure, which could be stitches, staples, Steri-Strips, or adhesive, is what you see on the cut. A cut is often covered by a bandage. The doctor has checked you carefully, but problems can develop later. If you notice any problems or new symptoms, get medical treatment right away. Follow-up care is a key part of your treatment and safety. Be sure to make and go to all appointments, and call your doctor if you are having problems. It's also a good idea to know your test results and keep a list of the medicines you take. How can you care for yourself at home? If a cut is open or closed  · Prop up the sore area on a pillow anytime you sit or lie down during the next 3 days. Try to keep it above the level of your heart. This will help reduce swelling. · Keep the cut dry for the first 24 to 48 hours. After this, you can shower if your doctor okays it. Pat the cut dry. · Don't soak the cut, such as in a bathtub. Your doctor will tell you when it's safe to get the cut wet. · After the first 24 to 48 hours, clean the cut with soap and water 2 times a day unless your doctor gives you different instructions. ¨ Don't use hydrogen peroxide or alcohol, which can slow healing. ¨ You may cover the cut with a thin layer of petroleum jelly and a nonstick bandage.   ¨ If the doctor put a bandage over the cut, put on a new bandage after cleaning the cut or if the bandage gets wet or dirty. · Avoid any activity that could cause your cut to reopen. · Be safe with medicines. Read and follow all instructions on the label. ¨ If the doctor gave you a prescription medicine for pain, take it as prescribed. ¨ If you are not taking a prescription pain medicine, ask your doctor if you can take an over-the-counter medicine. If the cut is closed with stitches, staples, or Steri-Strips  · Follow the above instructions for open or closed cuts. · Do not remove the stitches or staples on your own. Your doctor will tell you when to come back to have the stitches or staples removed. · Leave Steri-Strips on until they fall off. If the cut is closed with a skin adhesive  · Follow the above instructions for open or closed cuts. · Leave the skin adhesive on your skin until it falls off on its own. This may take 5 to 10 days. · Do not scratch, rub, or pick at the adhesive. · Do not put the sticky part of a bandage directly on the adhesive. · Do not put any kind of ointment, cream, or lotion over the area. This can make the adhesive fall off too soon. Do not use hydrogen peroxide or alcohol, which can slow healing. When should you call for help? Call your doctor now or seek immediate medical care if:    · You have new pain, or your pain gets worse.     · The skin near the cut is cold or pale or changes color.     · You have tingling, weakness, or numbness near the cut.     · The cut starts to bleed, and blood soaks through the bandage. Oozing small amounts of blood is normal.     · You have trouble moving the area near the cut.     · You have symptoms of infection, such as:  ¨ Increased pain, swelling, warmth, or redness around the cut. ¨ Red streaks leading from the cut. ¨ Pus draining from the cut. ¨ A fever.    Watch closely for changes in your health, and be sure to contact your doctor if:    · The cut reopens.     · You do not get better as expected.    Where can you learn more? Go to http://german-zafar.info/. Enter M735 in the search box to learn more about \"Cuts: Care Instructions. \"  Current as of: November 20, 2017  Content Version: 11.7  © 0232-3714 [employer]. Care instructions adapted under license by [employer] (which disclaims liability or warranty for this information). If you have questions about a medical condition or this instruction, always ask your healthcare professional. [address] any warranty or liability for your use of this information.

## 2018-07-30 ENCOUNTER — TELEPHONE (OUTPATIENT)
Dept: INTERNAL MEDICINE CLINIC | Age: 73
End: 2018-07-30

## 2018-07-30 NOTE — TELEPHONE ENCOUNTER
Called pt and left a v/m informing him he can go swimming but would be better to wait until lip has scabbed over.

## 2018-07-30 NOTE — TELEPHONE ENCOUNTER
Pt wants to know if he can go swimming with his lip injury.   Please advise - [phone] can leave VM

## 2018-08-03 ENCOUNTER — OFFICE VISIT (OUTPATIENT)
Dept: INTERNAL MEDICINE CLINIC | Age: 73
End: 2018-08-03

## 2018-08-03 VITALS
HEIGHT: 70 IN | RESPIRATION RATE: 14 BRPM | TEMPERATURE: 98.2 F | BODY MASS INDEX: 27.2 KG/M2 | DIASTOLIC BLOOD PRESSURE: 56 MMHG | OXYGEN SATURATION: 97 % | SYSTOLIC BLOOD PRESSURE: 120 MMHG | HEART RATE: 94 BPM | WEIGHT: 190 LBS

## 2018-08-03 DIAGNOSIS — S01.511D LIP LACERATION, SUBSEQUENT ENCOUNTER: Primary | ICD-10-CM

## 2018-08-03 DIAGNOSIS — R58 ECCHYMOSIS: ICD-10-CM

## 2018-08-03 DIAGNOSIS — R20.2 NUMBNESS AND TINGLING OF RIGHT LEG: ICD-10-CM

## 2018-08-03 DIAGNOSIS — R20.0 NUMBNESS AND TINGLING OF RIGHT LEG: ICD-10-CM

## 2018-08-03 NOTE — PROGRESS NOTES
Robles Ingram is a 72 y.o. male who was seen in clinic today (8/3/2018). Assessment & Plan:   Diagnoses and all orders for this visit:    1. Lip laceration, subsequent encounter- improving, okay to return to swimming, reviewed expectations, likely will scar slighlty    2. Ecchymosis- new dx, reviewed expectations, nothing concerning    3. Numbness and tingling of right leg- new dx to me, chronic issue, reviewed his knee stiffness sounds like knee pathology but numbness may not be. Reviewed will start w/ stretching/working out. If no changes will get EMG set up. Red flags and expectations were reviewed & discussed with the him. He verbalized understanding. Follow-up Disposition:  Return if symptoms worsen or fail to improve. Subjective:   Yosi Smith was seen today for Bleeding/Bruising    Dermatology Review  He is here to talk about cut to his lip and multiple bruises. He was last week after a fall. He is using A&D cream.       Pain Review:  He presents do to pain of his right knee. He reports increase in stiffness. It is numb/tingling at times (radiates from outside of knee to the foot). It is slightly worse. He was seen in April. He received a cortisone shot w/ complete relief of pain. Has seen PT. Prior to Admission medications    Medication Sig Start Date End Date Taking? Authorizing Provider   diclofenac (VOLTAREN) 1 % gel Apply  to affected area four (4) times daily as needed. Yes Historical Provider   econazole nitrate (SPECTAZOLE) 1 % topical cream Apply  to affected area two (2) times a day. Patient taking differently: Apply  to affected area two (2) times daily as needed. 3/17/17  Yes Riky Mccallum MD   ranitidine (ZANTAC) 75 mg tablet Take 75 mg by mouth as needed.    Yes Historical Provider   OTHER TAKES OCCASIONAL OTC ALLERGY MEDICATION    Yes Historical Provider   tadalafil (CIALIS) 5 mg tablet Take 1-4 tablets daily as needed 2/12/18   Mendoza GREEN Samuel Pitts MD          Allergies   Allergen Reactions    Lamisil [Terbinafine] Other (comments)     STOMACH ACHE           Review of Systems   Musculoskeletal: Negative for back pain, joint pain, myalgias and neck pain. Neurological: Positive for tingling. Negative for dizziness and focal weakness. Objective:   Physical Exam   Constitutional: No distress. HENT:   Lower lip left sided there is a minimal laceration that is extending from the lip to the skin, there is no longer any erythema, there is some granulations   Cardiovascular: Regular rhythm and normal heart sounds. No murmur heard. Pulmonary/Chest: Effort normal and breath sounds normal. He has no wheezes. He has no rales. Musculoskeletal:        Right knee: He exhibits normal range of motion, no swelling and no deformity. No tenderness found. Neurological:   Intact to light touch in lower extremities. Skin:   Bilateral upper thigh, lateral aspect, there is extensive bruising (L>R), multiple stages of healing   Psychiatric: He has a normal mood and affect. His behavior is normal.         Visit Vitals    /56    Pulse 94    Temp 98.2 °F (36.8 °C) (Oral)    Resp 14    Ht 5' 10\" (1.778 m)    Wt 190 lb (86.2 kg)    SpO2 97%    BMI 27.26 kg/m2         Disclaimer:  Advised him to call back or return to office if symptoms worsen/change/persist.  Discussed expected course/resolution/complications of diagnosis in detail with patient. Medication risks/benefits/costs/interactions/alternatives discussed with patient. He was given an after visit summary which includes diagnoses, current medications, & vitals. He expressed understanding with the diagnosis and plan. Aspects of this note may have been generated using voice recognition software. Despite editing, there may be some syntax errors.        Royal Vitale MD

## 2018-09-04 ENCOUNTER — TELEPHONE (OUTPATIENT)
Dept: INTERNAL MEDICINE CLINIC | Age: 73
End: 2018-09-04

## 2018-09-05 NOTE — TELEPHONE ENCOUNTER
Pt. punctured right wrist fri. 8/31/18 evening on a maty nail, has almost completely healed over, never had any sign of infection. Last Tdap 2010, wants to know if he should get a booster, does not feel like he needs an appt. If he needs the booster needs to come in soon, is going out of town.

## 2018-09-06 NOTE — TELEPHONE ENCOUNTER
Verified patient identity with two identifiers. Spoke with patient by phone, informed will send Td booster rx to local pharmacy. Patient denies signs of infection. Red flags to warrant clinical evaluation reviewed. Patient verbalized understanding.

## 2018-09-06 NOTE — TELEPHONE ENCOUNTER
Verified patient identity with two identifiers. Spoke with patient by phone, [facility] Bel[facility] does not carry TD, ok with Dr. Perri Prater for full TDAP. Patient will come tomorrow 9am for [place] to get TDAP.

## 2018-09-07 ENCOUNTER — CLINICAL SUPPORT (OUTPATIENT)
Dept: INTERNAL MEDICINE CLINIC | Age: 73
End: 2018-09-07

## 2018-09-07 DIAGNOSIS — Z23 NEED FOR TETANUS BOOSTER: Primary | ICD-10-CM

## 2019-01-14 DIAGNOSIS — Z82.49 FH: CORONARY ARTERY DISEASE: Primary | ICD-10-CM

## 2019-01-16 ENCOUNTER — OFFICE VISIT (OUTPATIENT)
Dept: INTERNAL MEDICINE CLINIC | Age: 74
End: 2019-01-16

## 2019-01-16 ENCOUNTER — HOSPITAL ENCOUNTER (OUTPATIENT)
Dept: LAB | Age: 74
Discharge: HOME | End: 2019-01-16
Payer: MEDICARE

## 2019-01-16 VITALS
TEMPERATURE: 97.9 F | BODY MASS INDEX: 27.17 KG/M2 | OXYGEN SATURATION: 95 % | DIASTOLIC BLOOD PRESSURE: 71 MMHG | HEART RATE: 70 BPM | SYSTOLIC BLOOD PRESSURE: 113 MMHG | HEIGHT: 70 IN | WEIGHT: 189.8 LBS | RESPIRATION RATE: 20 BRPM

## 2019-01-16 DIAGNOSIS — R91.8 ABNORMAL CT SCAN OF LUNG: ICD-10-CM

## 2019-01-16 DIAGNOSIS — Z13.39 SCREENING FOR ALCOHOLISM: ICD-10-CM

## 2019-01-16 DIAGNOSIS — K21.9 GASTROESOPHAGEAL REFLUX DISEASE WITHOUT ESOPHAGITIS: ICD-10-CM

## 2019-01-16 DIAGNOSIS — Z23 ENCOUNTER FOR IMMUNIZATION: ICD-10-CM

## 2019-01-16 DIAGNOSIS — Z82.49 FH: CORONARY ARTERIOSCLEROSIS: ICD-10-CM

## 2019-01-16 DIAGNOSIS — Z13.31 SCREENING FOR DEPRESSION: ICD-10-CM

## 2019-01-16 DIAGNOSIS — Z00.00 MEDICARE ANNUAL WELLNESS VISIT, SUBSEQUENT: Primary | ICD-10-CM

## 2019-01-16 DIAGNOSIS — Z71.89 ACP (ADVANCE CARE PLANNING): ICD-10-CM

## 2019-01-16 PROCEDURE — 80053 COMPREHEN METABOLIC PANEL: CPT

## 2019-01-16 PROCEDURE — 80061 LIPID PANEL: CPT

## 2019-01-16 PROCEDURE — 36415 COLL VENOUS BLD VENIPUNCTURE: CPT

## 2019-01-16 PROCEDURE — 86141 C-REACTIVE PROTEIN HS: CPT

## 2019-01-16 PROCEDURE — 85027 COMPLETE CBC AUTOMATED: CPT

## 2019-01-16 NOTE — PATIENT INSTRUCTIONS
Medicare Wellness Visit, Male The best way to live healthy is to have a lifestyle where you eat a well-balanced diet, exercise regularly, limit alcohol use, and quit all forms of tobacco/nicotine, if applicable. Regular preventive services are another way to keep healthy. Preventive services (vaccines, screening tests, monitoring & exams) can help personalize your care plan, which helps you manage your own care. Screening tests can find health problems at the earliest stages, when they are easiest to treat. [facility] follows the current, evidence-based guidelines published by the [facility] ([facility]) when recommending preventive services for our patients. Because we follow these guidelines, sometimes recommendations change over time as research supports it. (For example, a prostate screening blood test is no longer routinely recommended for men with no symptoms.) Of course, you and your doctor may decide to screen more often for some diseases, based on your risk and co-morbidities (chronic disease you are already diagnosed with). Preventive services for you include: - Medicare offers their members a free annual wellness visit, which is time for you and your primary care provider to discuss and plan for your preventive service needs. Take advantage of this benefit every year! 
-All adults over age 72 should receive the recommended pneumonia vaccines. Current USPSTF guidelines recommend a series of two vaccines for the best pneumonia protection.  
-All adults should have a flu vaccine yearly and an ECG.  All adults age 61 and older should receive a shingles vaccine once in their lifetime.   
-All adults age 38-68 who are overweight should have a diabetes screening test once every three years.  
-Other screening tests & preventive services for persons with diabetes include: an eye exam to screen for diabetic retinopathy, a kidney function test, a foot exam, and stricter control over your cholesterol.  
-Cardiovascular screening for adults with routine risk involves an electrocardiogram (ECG) at intervals determined by the provider.  
-Colorectal cancer screening should be done for adults age 54-65 with no increased risk factors for colorectal cancer. There are a number of acceptable methods of screening for this type of cancer. Each test has its own benefits and drawbacks. Discuss with your provider what is most appropriate for you during your annual wellness visit. The different tests include: colonoscopy (considered the best screening method), a fecal occult blood test, a fecal DNA test, and sigmoidoscopy. 
-All adults born between [facility] should be screened once for Hepatitis C. 
-An Abdominal Aortic Aneurysm (AAA) Screening is recommended for men age 73-68 who has ever smoked in their lifetime. Here is a list of your current Health Maintenance items (your personalized list of preventive services) with a due date: 
Health Maintenance Due Topic Date Due  Shingles Vaccine (1 of 2) 02/20/1997  Glaucoma Screening   07/18/2018  Flu Vaccine  08/01/2018 [facility] Annual Well Visit  09/07/2018  Colonoscopy  03/11/2019 Advance Care Planning: Care Instructions Your Care Instructions It can be hard to live with an illness that cannot be cured. But if your health is getting worse, you may want to make decisions about end-of-life care. Planning for the end of your life does not mean that you are giving up. It is a way to make sure that your wishes are met. Clearly stating your wishes can make it easier for your loved ones. Making plans while you are still able may also ease your mind and make your final days less stressful and more meaningful. Follow-up care is a key part of your treatment and safety.  Be sure to make and go to all appointments, and call your doctor if you are having problems. It's also a good idea to know your test results and keep a list of the medicines you take. What can you do to plan for the end of life? · You can bring these issues up with your doctor. You do not need to wait until your doctor starts the conversation. You might start with \"I would not be willing to live with . Sofia Haines \" When you complete this sentence it helps your doctor understand your wishes. · Talk openly and honestly with your doctor. This is the best way to understand the decisions you will need to make as your health changes. Know that you can always change your mind. · Ask your doctor about commonly used life-support measures. These include tube feedings, breathing machines, and fluids given through a vein (IV). Understanding these treatments will help you decide whether you want them. · You may choose to have these life-supporting treatments for a limited time. This allows a trial period to see whether they will help you. You may also decide that you want your doctor to take only certain measures to keep you alive. It is important to spell out these conditions so that your doctor and family understand them. · Talk to your doctor about how long you are likely to live. He or she may be able to give you an idea of what usually happens with your specific illness. · Think about preparing papers that state your wishes. This way there will not be any confusion about what you want. You can change your instructions at any time. Which papers should you prepare? Advance directives are legal papers that tell doctors how you want to be cared for at the end of your life. You do not need a  to write these papers. Ask your doctor or your state health department for information on how to write your advance directives. They may have the forms for each of these types of papers. Make sure your doctor has a copy of these on file, and give a copy to a family member or close friend. · Consider a do-not-resuscitate order (DNR). This order asks that no extra treatments be done if your heart stops or you stop breathing. Extra treatments may include cardiopulmonary resuscitation (CPR), electrical shock to restart your heart, or a machine to breathe for you. If you decide to have a DNR order, ask your doctor to explain and write it. Place the order in your home where everyone can easily see it. · Consider a living will. A living will explains your wishes about life support and other treatments at the end of your life if you become unable to speak for yourself. Living hoffman tell doctors to use or not use treatments that would keep you alive. You must have one or two witnesses or a notary present when you sign this form. · Consider a durable power of  for health care. This allows you to name a person to make decisions about your care if you are not able to. Most people ask a close friend or family member. Talk to this person about the kinds of treatments you want and those that you do not want. Make sure this person understands your wishes. These legal papers are simple to change. Tell your doctor what you want to change, and ask him or her to make a note in your medical file. Give your family updated copies of the papers. Where can you learn more? Go to http://german-zafar.info/. Enter P184 in the search box to learn more about \"Advance Care Planning: Care Instructions. \" Current as of: November 17, 2016 Content Version: 11.3 © 7968-5371 [employer]. Care instructions adapted under license by [employer] (which disclaims liability or warranty for this information). If you have questions about a medical condition or this instruction, always ask your healthcare professional. [address] any warranty or liability for your use of this information.

## 2019-01-16 NOTE — PROGRESS NOTES
Chief Complaint Patient presents with  Complete Physical  
 Annual Wellness Visit Pt states he is having difficulty swallowing. Pt states he needs refills on fluorocinde and econazole. 1. Have you been to the ER, urgent care clinic since your last visit? Hospitalized since your last visit? No 
 
2. Have you seen or consulted any other health care providers outside of the [address] [place] [address] since your last visit? Include any pap smears or colon screening.  No

## 2019-01-16 NOTE — ACP (ADVANCE CARE PLANNING)
Advance Care Planning (ACP) Provider Note - Comprehensive     Date of ACP Conversation: 01/16/19  Persons included in Conversation:  patient  Length of ACP Conversation in minutes: <16 minutes (Non-Billable)    Authorized Decision Maker (if patient is incapable of making informed decisions):   Healthcare Agent/Medical Power of  under Advance Directive      He has an advanced directive - a copy has been provided & still reflects him wishes. Reviewed DNR/DNI and patient is not interested. General ACP for ALL Patients with Decision Making Capacity:  Importance of advance care planning, including choosing a healthcare agent to communicate patient's healthcare decisions if patient lost the ability to make decisions, such as after a sudden illness or accident  Understanding of the healthcare agent role was assessed and information provided  Opportunity offered to explore how cultural, Presybeterian, spiritual, or personal beliefs would affect decisions for future care  Exploration of values, goals, and preferences if recovery is not expected, even with continued medical treatment in the event of: Imminent death or severe, permanent brain injury    For Serious or Chronic Illness:  Understanding of CPR, goals and expected outcomes, benefits and burdens discussed.   Understanding of medical condition  Information on CPR success rates provided (e.g. for CPR in hospital, survival to d/c at two weeks is 22%, for chronically ill or elderly/frail survival is less than 3%)    Interventions Provided:  Recommended communicating the plan and making copies for the healthcare agent, personal physician, and others as appropriate (e.g., health system)  Recommended review of completed ACP document annually or upon change in health status

## 2019-01-16 NOTE — PROGRESS NOTES
Jeanine Pulliam is a 72 y.o. male who was seen in clinic today (1/16/2019) for a full physical.     
 
Assessment & Plan:  
Diagnoses and all orders for this visit: 
 
1. Medicare annual wellness visit, subsequent 2. ACP (advance care planning) 3. Encounter for immunization- script for shingles provided. 4. Screening for alcoholism -     [place] ANNUAL ALCOHOL SCREEN 15 MIN 
 
5. Screening for depression 
-     [facility] [address] 6. Abnormal CT scan of lung- reviewed CT from [facility] in '16, he is asymptomatic, he is exercising regularly, did have asbestos exposure so will check. 
-     CT CHEST W CONT; Future 7. Gastroesophageal reflux disease without esophagitis- rarely an issue, mostly w/ swimming, using meds prn. 
 
8. FH: coronary arteriosclerosis- he is asymptomatic, he is exercising regularly, he has concerns about father passing away at this age from [address]. Reviewed previous stress test from '11. Will check labs (orders placed previously). Depending on results may have him see cardiology to put his mind to ease. Continue w/ healthy diet and exercise. Weight is stable, I have reviewed/discussed the above normal BMI with the patient. I have recommended the following interventions: encourage exercise and lifestyle education regarding diet. Follow-up Disposition: 
Return in about 1 year (around 1/16/2020), or if symptoms worsen or fail to improve.     
 
------------------------------------------------------------------------------------------ Subjective: Annual Wellness Visit- Subsequent VisitEnd of Life Planning: This was discussed with him today and he has an advanced directive - a copy has been provided. Reviewed DNR/DNI and patient is not interested. Depression Screen: PHQ over the last two weeks 1/16/2019 Little interest or pleasure in doing things Not at all Feeling down, depressed, irritable, or hopeless Not at all Total Score PHQ 2 0  
 Trouble falling or staying asleep, or sleeping too much - Feeling tired or having little energy - Poor appetite, weight loss, or overeating - Feeling bad about yourself - or that you are a failure or have let yourself or your family down - Trouble concentrating on things such as school, work, reading, or watching TV - Moving or speaking so slowly that other people could have noticed; or the opposite being so fidgety that others notice - Thoughts of being better off dead, or hurting yourself in some way -  
PHQ 9 Score - How difficult have these problems made it for you to do your work, take care of your home and get along with others - Fall Risk:  
Fall Risk Assessment, last 12 mths 1/16/2019 Able to walk? Yes Fall in past 12 months? No  
 
 
Abuse Screen: 
Abuse Screening Questionnaire 1/16/2019 Do you ever feel afraid of your partner? Lizzeth Magaña Are you in a relationship with someone who physically or mentally threatens you? Lizzeth Magaña Is it safe for you to go home? [facility] Alcohol Risk Factor Screening: On any occasion during the past 3 months, have you had more than 4 drinks containing alcohol? No 
Do you average more than 14 drinks per week? No 
 
Hearing Loss: Hearing is good but tinnitus is worse. Cognition Screen: 
Has your family/caregiver stated any concerns about your memory: no 
 
Activities of Daily Living:   
Requires assistance with: no ADLs Home contains: handrails and grab bars Exercise: very active Adult Nutrition Screen: No risk factors noted. Health Maintenance Daily Aspirin: n/a 
AAA Screening: n/a (USPSTF recommends for men 65-75 who have smoked) Glaucoma Screening: overdue, he will schedule follow up Immunizations:  
  Influenza: up to date. Tetanus: up to date. Shingles: due for Shingrix - script provided. Pneumonia: up to date.   
Cancer screening:  
 Prostate: guidelines reviewed, monitored by urologist.  Colon: guidelines reviewed, up to date - due this spring. Patient Care Team: 
Pgegy Swanson MD as PCP - General (Internal Medicine) Oralia Aguilar MD (Urology) Jeanine Sumner MD (Hematology and Oncology) Ekaterina Storey MD (Pulmonary Disease) Adam Arreola MD (General Surgery) Janie Cordova, [address], OD (Optometry) The following sections were reviewed & updated as appropriate: PMH, PSH, FH, and SH. Patient Active Problem List  
Diagnosis Code  Abnormal CT scan of lung R91.8  Painful ejaculation N53.12  
 GERD (gastroesophageal reflux disease) K21.9  Tinnitus of left ear H93.12 Prior to Admission medications Medication Sig Start Date End Date Taking? Authorizing Provider  
diclofenac (VOLTAREN) 1 % gel Apply  to affected area four (4) times daily as needed. Yes Provider, Historical  
tadalafil (CIALIS) 5 mg tablet Take 1-4 tablets daily as needed 2/12/18  Yes Peggy Swanson MD  
econazole nitrate (SPECTAZOLE) 1 % topical cream Apply  to affected area two (2) times a day. Patient taking differently: Apply  to affected area two (2) times daily as needed. 3/17/17  Yes Peggy Swanson MD  
ranitidine (ZANTAC) 75 mg tablet Take 75 mg by mouth as needed. Yes Provider, Historical  
OTHER TAKES OCCASIONAL OTC ALLERGY MEDICATION    Yes Provider, Historical  
  
 
 
Allergies Allergen Reactions  Lamisil [Terbinafine] Other (comments) STOMACH ACHE Review of Systems Constitutional: Negative for chills, fever and malaise/fatigue. Respiratory: Negative for cough and shortness of breath. Cardiovascular: Negative for chest pain and palpitations. Gastrointestinal: Negative for abdominal pain, blood in stool, constipation, diarrhea, heartburn, nausea and vomiting. Genitourinary:  
     He denies: nocturia, urinary hesitancy, urinary frequency, weak stream. 
     Reports trouble getting an erection or maintaining an erection. Reports trouble with AM erections. Musculoskeletal: Negative for joint pain and myalgias. Neurological: Negative for tingling, focal weakness and headaches. Endo/Heme/Allergies: Does not bruise/bleed easily. Psychiatric/Behavioral: Negative for depression. The patient is not nervous/anxious and does not have insomnia. Objective:  
Physical Exam  
Constitutional: No distress. HENT:  
Mouth/Throat: Mucous membranes are normal.  
Eyes: Conjunctivae are normal. No scleral icterus. Neck: Neck supple. Cardiovascular: Regular rhythm and normal heart sounds. No murmur heard. Pulmonary/Chest: Effort normal and breath sounds normal. He has no wheezes. He has no rales. Abdominal: Soft. Bowel sounds are normal. He exhibits no mass. There is no hepatosplenomegaly. There is no tenderness. Musculoskeletal: He exhibits no edema. Lymphadenopathy:  
  He has no cervical adenopathy. Skin: No rash noted. Psychiatric: He has a normal mood and affect. His behavior is normal.  
  
 
 
Visit Vitals /71 (BP 1 Location: Left arm, BP Patient Position: Sitting) Pulse 70 Temp 97.9 °F (36.6 °C) (Oral) Resp 20 Ht 5' 10\" (1.778 m) Wt 189 lb 12.8 oz (86.1 kg) SpO2 95% BMI 27.23 kg/m² Advised him to call back or return to office if symptoms worsen/change/persist. 
Discussed expected course/resolution/complications of diagnosis in detail with patient. Medication risks/benefits/costs/interactions/alternatives discussed with patient. He was given an after visit summary which includes diagnoses, current medications, & vitals. He expressed understanding with the diagnosis and plan. Aspects of this note may have been generated using voice recognition software. Despite editing, there may be some syntax errors.   
 
 
Amy Knutson MD

## 2019-01-17 LAB
ALBUMIN SERPL-MCNC: 4.4 G/DL (ref 3.5–4.8)
ALBUMIN/GLOB SERPL: 1.5 {RATIO} (ref 1.2–2.2)
ALP SERPL-CCNC: 70 IU/L (ref 39–117)
ALT SERPL-CCNC: 14 IU/L (ref 0–44)
AST SERPL-CCNC: 18 IU/L (ref 0–40)
BILIRUB SERPL-MCNC: 0.8 MG/DL (ref 0–1.2)
BUN SERPL-MCNC: 23 MG/DL (ref 8–27)
BUN/CREAT SERPL: 24 (ref 10–24)
CALCIUM SERPL-MCNC: 9.7 MG/DL (ref 8.6–10.2)
CHLORIDE SERPL-SCNC: 101 MMOL/L (ref 96–106)
CHOLEST SERPL-MCNC: 180 MG/DL (ref 100–199)
CO2 SERPL-SCNC: 25 MMOL/L (ref 20–29)
CREAT SERPL-MCNC: 0.97 MG/DL (ref 0.76–1.27)
CRP SERPL HS-MCNC: 2.06 MG/L (ref 0–3)
ERYTHROCYTE [DISTWIDTH] IN BLOOD BY AUTOMATED COUNT: 13.7 % (ref 12.3–15.4)
GLOBULIN SER CALC-MCNC: 3 G/DL (ref 1.5–4.5)
GLUCOSE SERPL-MCNC: 93 MG/DL (ref 65–99)
HCT VFR BLD AUTO: 42.7 % (ref 37.5–51)
HDLC SERPL-MCNC: 51 MG/DL
HGB BLD-MCNC: 14.5 G/DL (ref 13–17.7)
LDLC SERPL CALC-MCNC: 113 MG/DL (ref 0–99)
MCH RBC QN AUTO: 30 PG (ref 26.6–33)
MCHC RBC AUTO-ENTMCNC: 34 G/DL (ref 31.5–35.7)
MCV RBC AUTO: 88 FL (ref 79–97)
PLATELET # BLD AUTO: 218 X10E3/UL (ref 150–379)
POTASSIUM SERPL-SCNC: 5 MMOL/L (ref 3.5–5.2)
PROT SERPL-MCNC: 7.4 G/DL (ref 6–8.5)
RBC # BLD AUTO: 4.84 X10E6/UL (ref 4.14–5.8)
SODIUM SERPL-SCNC: 141 MMOL/L (ref 134–144)
TRIGL SERPL-MCNC: 82 MG/DL (ref 0–149)
VLDLC SERPL CALC-MCNC: 16 MG/DL (ref 5–40)
WBC # BLD AUTO: 7.3 X10E3/UL (ref 3.4–10.8)

## 2019-01-17 NOTE — PROGRESS NOTES
Please call patient. All labs are stable or at goal for him. CRP normal.  Lipids stable over the last 5+ years. Please order stress test (dx: FH for CAD). If it is not approved then needs to see cardiologist.  These labs are reassuring.

## 2019-01-17 NOTE — PROGRESS NOTES
Patient given results and recommendation per Dr. Anabelle Dennis, he wants to hold off on the stress test and see a cardiologist, per Dr. Anabelle Dennis verbal order this is okay. Copy of labs mailed to patient's home address at his request. Stress test not ordered.

## 2019-03-06 RX ORDER — FLUOCINONIDE 0.5 MG/G
OINTMENT TOPICAL 2 TIMES DAILY
Qty: 15 G | Refills: 0 | Status: SHIPPED | OUTPATIENT
Start: 2019-03-06 | End: 2019-10-07

## 2019-03-06 RX ORDER — ECONAZOLE NITRATE 10 MG/G
CREAM TOPICAL
Qty: 15 G | Refills: 0 | Status: SHIPPED | OUTPATIENT
Start: 2019-03-06 | End: 2020-07-21 | Stop reason: SDUPTHER

## 2019-03-06 NOTE — TELEPHONE ENCOUNTER
Patient is requesting a refill   Would like a few refills    Fluocinonide ointment       maty at [facility]

## 2019-03-06 NOTE — TELEPHONE ENCOUNTER
Verified patient identity with two identifiers. Spoke with patient by phone. He uses both ointments for perianal itching, issue happens rarely that is why he hasn't needed a refill in several years, wants refill on Fluocinonide & Econazole.   Will send to Dr. Janeth Alexander for approval.

## 2019-04-29 ENCOUNTER — TELEPHONE (OUTPATIENT)
Dept: INTERNAL MEDICINE CLINIC | Age: 74
End: 2019-04-29

## 2019-04-29 NOTE — TELEPHONE ENCOUNTER
[phone]      The patient would like to  his last notes and lab results from the fd. Any questions please call.  He needs to pick this up today

## 2019-05-29 ENCOUNTER — TELEPHONE (OUTPATIENT)
Dept: INTERNAL MEDICINE CLINIC | Age: 74
End: 2019-05-29

## 2019-05-29 DIAGNOSIS — N52.9 ERECTILE DYSFUNCTION, UNSPECIFIED ERECTILE DYSFUNCTION TYPE: ICD-10-CM

## 2019-05-29 RX ORDER — TADALAFIL 5 MG/1
TABLET ORAL
Qty: 30 TAB | Refills: 0 | Status: SHIPPED | OUTPATIENT
Start: 2019-05-29 | End: 2019-07-22

## 2019-05-29 NOTE — TELEPHONE ENCOUNTER
Jen Arriola ([facility]) [phone] Husam Powers     Pt is [facility]

## 2019-06-05 ENCOUNTER — TELEPHONE (OUTPATIENT)
Dept: INTERNAL MEDICINE CLINIC | Age: 74
End: 2019-06-05

## 2019-06-05 DIAGNOSIS — N52.9 ERECTILE DYSFUNCTION, UNSPECIFIED ERECTILE DYSFUNCTION TYPE: ICD-10-CM

## 2019-06-05 RX ORDER — TADALAFIL 5 MG/1
TABLET ORAL
Qty: 30 TAB | Refills: 0 | Status: CANCELLED | OUTPATIENT
Start: 2019-06-05

## 2019-06-05 NOTE — TELEPHONE ENCOUNTER
Offer him Viagra. Notify him it may not be covered. [facility] is an option if he wants to do this.

## 2019-06-06 RX ORDER — SILDENAFIL CITRATE 20 MG/1
TABLET ORAL
Qty: 90 TAB | Refills: 0 | Status: SHIPPED | COMMUNITY
Start: 2019-06-06 | End: 2022-01-14

## 2019-06-06 NOTE — TELEPHONE ENCOUNTER
Call to patient. He would like to proceed with [employer] options. Advised him they would be contacting him for credit card information. He reports many years ago when he tried Viagra, he thinks 50 mg, he had a \"blue haze\" reaction, (saw blue) but not really sure if that is related to the medication. Willing to try again. He also wants to proceed with prior auth for Cialis. Advised him to check with his pharmacy to make sure they sent prior auth information. Patient verbalized understanding.   RX sent per [address] and at patient request.

## 2019-06-07 DIAGNOSIS — N52.9 ERECTILE DYSFUNCTION, UNSPECIFIED ERECTILE DYSFUNCTION TYPE: ICD-10-CM

## 2019-06-09 RX ORDER — TADALAFIL 5 MG/1
TABLET ORAL
Qty: 30 TAB | Refills: 0 | OUTPATIENT
Start: 2019-06-09

## 2019-06-09 NOTE — TELEPHONE ENCOUNTER
Only needs to have one ED medication. Would use up the Viagra he has and if that times wants to transition over to Cialis 20mg we can try that.

## 2019-06-10 NOTE — TELEPHONE ENCOUNTER
Pt. Notified we have no way to see what his co-pay would be for either drug, he has ordered viagra from a company and will let Dr Pamela Duque know if it's effective or if he decides to try cialis in the future.

## 2019-07-09 ENCOUNTER — OFFICE VISIT (OUTPATIENT)
Dept: INTERNAL MEDICINE CLINIC | Age: 74
End: 2019-07-09

## 2019-07-09 ENCOUNTER — HOSPITAL ENCOUNTER (OUTPATIENT)
Dept: LAB | Age: 74
Discharge: HOME | End: 2019-07-09
Payer: MEDICARE

## 2019-07-09 VITALS
RESPIRATION RATE: 16 BRPM | TEMPERATURE: 97.8 F | BODY MASS INDEX: 27.2 KG/M2 | SYSTOLIC BLOOD PRESSURE: 104 MMHG | HEIGHT: 70 IN | OXYGEN SATURATION: 96 % | HEART RATE: 78 BPM | DIASTOLIC BLOOD PRESSURE: 80 MMHG | WEIGHT: 190 LBS

## 2019-07-09 DIAGNOSIS — R91.8 ABNORMAL CT SCAN OF LUNG: ICD-10-CM

## 2019-07-09 DIAGNOSIS — R53.83 FATIGUE, UNSPECIFIED TYPE: Primary | ICD-10-CM

## 2019-07-09 PROCEDURE — 85025 COMPLETE CBC W/AUTO DIFF WBC: CPT

## 2019-07-09 PROCEDURE — 36415 COLL VENOUS BLD VENIPUNCTURE: CPT

## 2019-07-09 PROCEDURE — 80053 COMPREHEN METABOLIC PANEL: CPT

## 2019-07-09 NOTE — PROGRESS NOTES
August Kirkpatrick is a 69 y.o. male who was seen in clinic today (7/9/2019) for an acute visit. Assessment & Plan:     Diagnoses and all orders for this visit:    1. Fatigue, unspecified type- this is a new problem, symptoms are: not changed, differential dx reviewed with the patient, exact etiology is unclear at this time. Doubt cardiac as he is exercising regularly. Unlikely to be pulmonary but is a possibility. Unclear if infectious. Will check labs. If normal will need to check sleep. Red flags were reviewed with the patient to RTC or notify me, expected time course for resolution reviewed. -     METABOLIC PANEL, COMPREHENSIVE  -     CBC WITH AUTOMATED DIFF    2. Abnormal CT scan of lung- never completed CT scan that was ordered in January, he was concerned about radiation exposure, reviewed last CT was in '14, recommended this, will defer and revisit if labs are normal         Follow-up and Dispositions    · Return if symptoms worsen or fail to improve. Subjective:   Elzbieta Lafleur was seen today for Fatigue    He reports low energy for the last month. It is fluctuating to some degree, he is noticing a correlation with temperature. He has started to take a 2-3 hr nap during the day due to feeling exhausted. He reports feeling well rested when he wakes up and after his nap. Only other concern is that he is having some on/off dry cough. This has improved, started after being at a social event that multiple people were sick, no change w/ timing of the day. He is still swimming a mile a day and only issue is some leg cramps on/off. Prior to Admission medications    Medication Sig Start Date End Date Taking? Authorizing Provider   sildenafil, antihypertensive, (REVATIO) 20 mg tablet Take 1-5 tablets by mouth prior to sexual activity as needed. 6/6/19  Yes Watson Kim MD   econazole nitrate (SPECTAZOLE) 1 % topical cream Apply to effected as needed twice a day.  3/6/19  Yes Diepsh West MD   fluocinoNIDE (LIDEX) 0.05 % ointment Apply  to affected area two (2) times a day. 3/6/19  Yes Dipesh West MD   diclofenac (VOLTAREN) 1 % gel Apply  to affected area four (4) times daily as needed. Yes Provider, Historical   ranitidine (ZANTAC) 75 mg tablet Take 75 mg by mouth as needed. Yes Provider, Historical   tadalafil (CIALIS) 5 mg tablet Take 1-4 tablets daily as needed 5/29/19   Dipesh West MD   varicella-zoster recombinant, PF, [facility]) 50 mcg/0.5 mL susr injection 0.5 mL IM once now and then repeat in 2-6 months 1/16/19   Dipesh West MD   OTHER TAKES OCCASIONAL OTC ALLERGY MEDICATION     Provider, Historical          Allergies   Allergen Reactions    Lamisil [Terbinafine] Other (comments)     STOMACH ACHE           Review of Systems   Constitutional: Positive for malaise/fatigue. Negative for chills, fever and weight loss. Respiratory: Negative for cough and shortness of breath. Cardiovascular: Negative for chest pain and palpitations. Gastrointestinal: Negative for abdominal pain, constipation, diarrhea, nausea and vomiting. Musculoskeletal: Positive for myalgias (bilateral thighs, on/off, with swimming but also w/ normal activity). Negative for back pain and neck pain. Psychiatric/Behavioral: Negative for depression. The patient is not nervous/anxious. Objective:   Physical Exam   Constitutional: No distress. Eyes: Conjunctivae are normal. No scleral icterus. Cardiovascular: Regular rhythm and normal heart sounds. No murmur heard. Pulmonary/Chest: Effort normal and breath sounds normal. He has no wheezes. He has no rales. Abdominal: Soft. Bowel sounds are normal. He exhibits no mass. There is no hepatosplenomegaly. There is no tenderness. Lymphadenopathy:     He has no cervical adenopathy.          Visit Vitals  /80   Pulse 78   Temp 97.8 °F (36.6 °C) (Oral)   Resp 16   Ht 5' 10\" (1.778 m)   Wt 190 lb (86.2 kg)   SpO2 96%   BMI 27.26 kg/m²         Disclaimer:  Advised him to call back or return to office if symptoms worsen/change/persist.  Discussed expected course/resolution/complications of diagnosis in detail with patient. Medication risks/benefits/costs/interactions/alternatives discussed with patient. He was given an after visit summary which includes diagnoses, current medications, & vitals. He expressed understanding with the diagnosis and plan. Aspects of this note may have been generated using voice recognition software. Despite editing, there may be some syntax errors.        Ed Marie MD

## 2019-07-10 LAB
ALBUMIN SERPL-MCNC: 4 G/DL (ref 3.5–4.8)
ALBUMIN/GLOB SERPL: 1.5 {RATIO} (ref 1.2–2.2)
ALP SERPL-CCNC: 68 IU/L (ref 39–117)
ALT SERPL-CCNC: 13 IU/L (ref 0–44)
AST SERPL-CCNC: 17 IU/L (ref 0–40)
BASOPHILS # BLD AUTO: 0 X10E3/UL (ref 0–0.2)
BASOPHILS NFR BLD AUTO: 1 %
BILIRUB SERPL-MCNC: 0.5 MG/DL (ref 0–1.2)
BUN SERPL-MCNC: 28 MG/DL (ref 8–27)
BUN/CREAT SERPL: 31 (ref 10–24)
CALCIUM SERPL-MCNC: 9.7 MG/DL (ref 8.6–10.2)
CHLORIDE SERPL-SCNC: 103 MMOL/L (ref 96–106)
CO2 SERPL-SCNC: 26 MMOL/L (ref 20–29)
CREAT SERPL-MCNC: 0.89 MG/DL (ref 0.76–1.27)
EOSINOPHIL # BLD AUTO: 0.3 X10E3/UL (ref 0–0.4)
EOSINOPHIL NFR BLD AUTO: 4 %
ERYTHROCYTE [DISTWIDTH] IN BLOOD BY AUTOMATED COUNT: 14.4 % (ref 12.3–15.4)
GLOBULIN SER CALC-MCNC: 2.7 G/DL (ref 1.5–4.5)
GLUCOSE SERPL-MCNC: 82 MG/DL (ref 65–99)
HCT VFR BLD AUTO: 40.9 % (ref 37.5–51)
HGB BLD-MCNC: 14 G/DL (ref 13–17.7)
IMM GRANULOCYTES # BLD AUTO: 0 X10E3/UL (ref 0–0.1)
IMM GRANULOCYTES NFR BLD AUTO: 0 %
LYMPHOCYTES # BLD AUTO: 1.7 X10E3/UL (ref 0.7–3.1)
LYMPHOCYTES NFR BLD AUTO: 28 %
MCH RBC QN AUTO: 30.1 PG (ref 26.6–33)
MCHC RBC AUTO-ENTMCNC: 34.2 G/DL (ref 31.5–35.7)
MCV RBC AUTO: 88 FL (ref 79–97)
MONOCYTES # BLD AUTO: 0.5 X10E3/UL (ref 0.1–0.9)
MONOCYTES NFR BLD AUTO: 9 %
NEUTROPHILS # BLD AUTO: 3.5 X10E3/UL (ref 1.4–7)
NEUTROPHILS NFR BLD AUTO: 58 %
PLATELET # BLD AUTO: 209 X10E3/UL (ref 150–450)
POTASSIUM SERPL-SCNC: 4.9 MMOL/L (ref 3.5–5.2)
PROT SERPL-MCNC: 6.7 G/DL (ref 6–8.5)
RBC # BLD AUTO: 4.65 X10E6/UL (ref 4.14–5.8)
SODIUM SERPL-SCNC: 141 MMOL/L (ref 134–144)
WBC # BLD AUTO: 5.9 X10E3/UL (ref 3.4–10.8)

## 2019-07-22 ENCOUNTER — TELEPHONE (OUTPATIENT)
Dept: INTERNAL MEDICINE CLINIC | Age: 74
End: 2019-07-22

## 2019-07-22 ENCOUNTER — OFFICE VISIT (OUTPATIENT)
Dept: INTERNAL MEDICINE CLINIC | Age: 74
End: 2019-07-22

## 2019-07-22 ENCOUNTER — HOSPITAL ENCOUNTER (OUTPATIENT)
Dept: LAB | Age: 74
Discharge: HOME | End: 2019-07-22
Payer: MEDICARE

## 2019-07-22 VITALS
DIASTOLIC BLOOD PRESSURE: 64 MMHG | SYSTOLIC BLOOD PRESSURE: 110 MMHG | HEIGHT: 70 IN | HEART RATE: 66 BPM | WEIGHT: 192 LBS | BODY MASS INDEX: 27.49 KG/M2 | TEMPERATURE: 97.7 F | OXYGEN SATURATION: 96 % | RESPIRATION RATE: 14 BRPM

## 2019-07-22 DIAGNOSIS — R91.8 ABNORMAL CT SCAN OF LUNG: ICD-10-CM

## 2019-07-22 DIAGNOSIS — R53.83 FATIGUE, UNSPECIFIED TYPE: Primary | ICD-10-CM

## 2019-07-22 PROCEDURE — 36415 COLL VENOUS BLD VENIPUNCTURE: CPT

## 2019-07-22 PROCEDURE — 84443 ASSAY THYROID STIM HORMONE: CPT

## 2019-07-22 NOTE — PROGRESS NOTES
Brooks Melendez is a 69 y.o. male who was seen in clinic today (7/22/2019) for an acute visit. Assessment & Plan:   Diagnoses and all orders for this visit:    1. Fatigue, unspecified type- this is a new problem, symptoms are: improved but still an issue. I spent 25 minutes with the patient and > 50% of the time was spent counseling on the differential dx, work up to date and future, and reassurance. Pt is anxious. At this time exact etiology is unclear at this time. Would favor more heat. Reassured him I'm not sure what is going on. Labs to date look good, will check thyroid. He maybe overdoing it as he is still exercising regularly w/o any concerning features. Will defer checking T level. will get imaging of chest set up, was ordered previously in Jan but he never had it done. He will get this set up, but wants to do it at [address] [place] [address]. Will rest and reassess as temperature is supposed to decrease this week (has been in 90's last two weeks). Red flags were reviewed with the patient to RTC or notify me, expected time course for resolution reviewed. -     TSH 3RD GENERATION    2. Abnormal CT scan of lung- chronic issue, unclear of stability, reviewed CT from '15, reviewed rational of repeating, if stable no further monitoring likely needed          Subjective:   Jose Wang was seen today for Fatigue    Fatigue Review  He reports low energy for the last month. He was seen a few weeks ago. He reports he has improved slightly, but still not back to his baseline. lab work last visit was normal.  It is fluctuating to some degree, he is noticing a correlation with temperature. He worked outside for a few hours yesterday, felt like he needed a 10-15 minute nap, but ended up sleeping for 90 minutes. He has started to take a 2-3 hr nap during the day due to feeling exhausted. He reports feeling well rested when he wakes up in AM and after his nap.   Only other concern is that he is having some on/off dry cough.  This has improved, started after being at a social event that multiple people were sick, no change w/ timing of the day. He is still swimming a mile a day without CP or SOB. He did see cardiologist this spring, note reviewed, did not think any further cardiac testing was needed        Prior to Admission medications    Medication Sig Start Date End Date Taking? Authorizing Provider   sildenafil, antihypertensive, (REVATIO) 20 mg tablet Take 1-5 tablets by mouth prior to sexual activity as needed. 6/6/19  Yes Melvin Almonte MD   econazole nitrate (SPECTAZOLE) 1 % topical cream Apply to effected as needed twice a day. 3/6/19  Yes Melvin Almonte MD   fluocinoNIDE (LIDEX) 0.05 % ointment Apply  to affected area two (2) times a day. 3/6/19  Yes Melvin Almonte MD   diclofenac (VOLTAREN) 1 % gel Apply  to affected area four (4) times daily as needed. Yes Provider, Historical   ranitidine (ZANTAC) 75 mg tablet Take 75 mg by mouth as needed. Yes Provider, Historical   OTHER TAKES OCCASIONAL OTC ALLERGY MEDICATION    Yes Provider, Historical   varicella-zoster recombinant, PF, (SHINGRIX) 50 mcg/0.5 mL susr injection 0.5 mL IM once now and then repeat in 2-6 months 1/16/19   Melvin Almonte MD          Allergies   Allergen Reactions    Lamisil [Terbinafine] Other (comments)     STOMACH ACHE           Review of Systems   Constitutional: Positive for malaise/fatigue. Negative for weight loss. HENT: Negative for congestion and sore throat. Respiratory: Negative for cough and shortness of breath. Cardiovascular: Negative for chest pain, palpitations and leg swelling. Gastrointestinal: Negative for abdominal pain, blood in stool, constipation, diarrhea, heartburn, melena, nausea and vomiting. He reports in the last few weeks he reports having fullness in his L abdomen, could not sleep on the L side.   This ha resolved w/o any intervention   Musculoskeletal: Negative for joint pain and myalgias (previous cramps have resolved). Skin: Negative for rash. Neurological: Negative for dizziness, tingling and headaches. Psychiatric/Behavioral: Negative for depression. The patient does not have insomnia. Objective:   Physical Exam - deferred       Visit Vitals  /64   Pulse 66   Temp 97.7 °F (36.5 °C) (Oral)   Resp 14   Ht 5' 10\" (1.778 m)   Wt 192 lb (87.1 kg)   SpO2 96%   BMI 27.55 kg/m²         Disclaimer:  Advised him to call back or return to office if symptoms worsen/change/persist.  Discussed expected course/resolution/complications of diagnosis in detail with patient. Medication risks/benefits/costs/interactions/alternatives discussed with patient. He was given an after visit summary which includes diagnoses, current medications, & vitals. He expressed understanding with the diagnosis and plan. Aspects of this note may have been generated using voice recognition software. Despite editing, there may be some syntax errors.        Adryan Marie MD

## 2019-07-23 LAB — TSH SERPL DL<=0.005 MIU/L-ACNC: 2.01 UIU/ML (ref 0.45–4.5)

## 2019-07-23 NOTE — PROGRESS NOTES
Patient notified per Dr. Leonidas Espinal TSH is normal he verbalized understanding. States he is feeling a little better, thinks the heat may have been a contributing factor.

## 2019-10-07 ENCOUNTER — OFFICE VISIT (OUTPATIENT)
Dept: INTERNAL MEDICINE CLINIC | Age: 74
End: 2019-10-07

## 2019-10-07 VITALS
DIASTOLIC BLOOD PRESSURE: 62 MMHG | WEIGHT: 191 LBS | OXYGEN SATURATION: 97 % | HEIGHT: 70 IN | TEMPERATURE: 98 F | BODY MASS INDEX: 27.35 KG/M2 | SYSTOLIC BLOOD PRESSURE: 100 MMHG | HEART RATE: 80 BPM | RESPIRATION RATE: 16 BRPM

## 2019-10-07 DIAGNOSIS — M25.561 ACUTE PAIN OF RIGHT KNEE: Primary | ICD-10-CM

## 2019-10-07 RX ORDER — METHYLPREDNISOLONE ACETATE 40 MG/ML
40 INJECTION, SUSPENSION INTRA-ARTICULAR; INTRALESIONAL; INTRAMUSCULAR; SOFT TISSUE ONCE
Qty: 1 ML | Refills: 0
Start: 2019-10-07 | End: 2019-10-07

## 2019-10-07 RX ORDER — LIDOCAINE HYDROCHLORIDE 10 MG/ML
1 INJECTION INFILTRATION; PERINEURAL ONCE
Qty: 1 VIAL | Refills: 0
Start: 2019-10-07 | End: 2019-10-07

## 2019-10-07 NOTE — PROGRESS NOTES
Molina Campa is a 69 y.o. male who was seen in clinic today (10/7/2019) for an acute visit. Assessment & Plan:     Diagnoses and all orders for this visit:    1. Acute pain of right knee- this is a recurrent problem, symptoms are: not changed, differential dx reviewed with the patient, favor OA vs overuse. Injection has worked in the past so will treat w/ that. Reviewed when to consider imaging or seeing specialist.  Reviewed stretching & avoiding prolonged sitting. Red flags were reviewed with the patient to RTC or notify me, expected time course for resolution reviewed. -     REFERRAL TO PHYSICAL THERAPY  -     methylPREDNISolone acetate (DEPO-MEDROL) 40 mg/mL injection; 1 mL by Intra artICUlar route once for 1 dose. -     METHYLPREDNISOLONE ACETATE INJECTION 40 MG  -     lidocaine (XYLOCAINE) 10 mg/mL (1 %) injection; 1 mL by IntraDERMal route once for 1 dose. Follow-up and Dispositions    · Return if symptoms worsen or fail to improve. Subjective:   Jaiden Naylor was seen today for Numbness    Ortho Review:  He presents do to numbness & weakness in both his legs that is secondary to no known injury and started several months ago. Since it started his pain has worsened in the last few weeks. He reports R>L. The weakness is in the hip & knee. The pain, when occurs is a cramp, it is in his calf or back of his knee. He also reports numbness and burning, behind the knee & medial side of the knee. Exacerbating factors identifiable by patient are walking (he reports gait is off), prolonged driving, and extending the knee. He is swimming 1 mile per day w/o any issues. He has tried the following: Voltaran gel. His last steroid injection in his R knee was 4/6/18. Procedure Note  Procedure: Joint injection  Location: knee - right  Time: 3:45 PM    Procedure explained to patient.       Time out performed:  * Patient was identified by name and date of birth   * Agreement on procedure being performed was verified  * Risks and Benefits explained to the patient  * Procedure site verified and marked as necessary  * Patient was positioned for comfort  * Consent was signed and verified    Pain level prior to procedure: 1/10  Pain level after procedure: 1/10    The area was sterilized with Povidone-Iodine. The site was anesthetized with ethyl chloride. An 18 gauge needle was used to inject 40mg of depo-medrol and 1mL of 1% lidocaine into the right knee using a lateral approach. Patient tolerated the procedure well. Hemostasis was obtained and a bandage placed. Red flags were reviewed with the patient to RTC or notify me. Prior to Admission medications    Medication Sig Start Date End Date Taking? Authorizing Provider   sildenafil, antihypertensive, (REVATIO) 20 mg tablet Take 1-5 tablets by mouth prior to sexual activity as needed. 6/6/19  Yes Olya Boss MD   econazole nitrate (SPECTAZOLE) 1 % topical cream Apply to effected as needed twice a day. 3/6/19  Yes Olya Boss MD   diclofenac (VOLTAREN) 1 % gel Apply  to affected area four (4) times daily as needed. Yes Provider, Historical   OTHER TAKES OCCASIONAL OTC ALLERGY MEDICATION    Yes Provider, Historical   fluocinoNIDE (LIDEX) 0.05 % ointment Apply  to affected area two (2) times a day. 3/6/19 10/7/19  Olya Boss MD   varicella-zoster recombinant, PF, [facility]) 50 mcg/0.5 mL susr injection 0.5 mL IM once now and then repeat in 2-6 months 1/16/19 10/7/19  Olya Boss MD   ranitidine (ZANTAC) 75 mg tablet Take 75 mg by mouth as needed. 10/7/19  Provider, Historical          Allergies   Allergen Reactions    Lamisil [Terbinafine] Other (comments)     STOMACH ACHE    Other Medication Other (comments)     Does not want to take narcotics, hypersensitive           Review of Systems   Gastrointestinal: Negative for abdominal pain, heartburn, nausea and vomiting.    Musculoskeletal: Negative for back pain and neck pain. Objective:   Physical Exam   Musculoskeletal:        Right knee: He exhibits normal range of motion, no swelling and no effusion. No tenderness found. Visit Vitals  /62   Pulse 80   Temp 98 °F (36.7 °C) (Oral)   Resp 16   Ht 5' 10\" (1.778 m)   Wt 191 lb (86.6 kg)   SpO2 97%   BMI 27.41 kg/m²         Disclaimer:  Advised him to call back or return to office if symptoms worsen/change/persist.  Discussed expected course/resolution/complications of diagnosis in detail with patient. Medication risks/benefits/costs/interactions/alternatives discussed with patient. He was given an after visit summary which includes diagnoses, current medications, & vitals. He expressed understanding with the diagnosis and plan. Aspects of this note may have been generated using voice recognition software. Despite editing, there may be some syntax errors.        Kerri Homans, MD

## 2019-10-10 ENCOUNTER — TELEPHONE (OUTPATIENT)
Dept: INTERNAL MEDICINE CLINIC | Age: 74
End: 2019-10-10

## 2019-10-10 NOTE — TELEPHONE ENCOUNTER
Shot is helping knee  Leg has more pain and feels like it will collapse. Advise next step  Pt back in town.   Advise - [phone]/P [phone]/R

## 2019-10-10 NOTE — TELEPHONE ENCOUNTER
Patient had a depo-medrol injection in right knee on 10/7/19, reports pain is better in his knee, however approximately 6 inches down from his knee he has \"excrutiating pain\" on the bone & calf, cannot bear weight, has to \"use a cane for support and his leg keeps trying to colapse\" He scheduled an appt with Dr. Carlos Baltazar for tomorrow at [facility]. He denies any swelling or redness. He is using voltaren gel, icing and tylenol PRN. If you think he doesn't need to be seen and just needs imaging please call him first thing in the morning. He is aware you are out of the office today.

## 2019-10-11 ENCOUNTER — OFFICE VISIT (OUTPATIENT)
Dept: INTERNAL MEDICINE CLINIC | Age: 74
End: 2019-10-11

## 2019-10-11 VITALS
HEART RATE: 80 BPM | BODY MASS INDEX: 27.49 KG/M2 | WEIGHT: 192 LBS | RESPIRATION RATE: 16 BRPM | TEMPERATURE: 97.5 F | OXYGEN SATURATION: 96 % | DIASTOLIC BLOOD PRESSURE: 66 MMHG | SYSTOLIC BLOOD PRESSURE: 116 MMHG | HEIGHT: 70 IN

## 2019-10-11 DIAGNOSIS — M79.661 RIGHT CALF PAIN: Primary | ICD-10-CM

## 2019-10-11 NOTE — PROGRESS NOTES
Erna Field is a 69 y.o. male who was seen in clinic today (10/11/2019) for an acute visit. Assessment & Plan:     Diagnoses and all orders for this visit:    1. Right calf pain- this is a chronic problem (present for 3+ months), symptoms are: fluctuating (slightly worse now that knee is improved), differential dx reviewed with the patient, favor muscular. Do not think this is articular or DVT related. Will try an ankle brace to see if he can drive w/o flexing his ankle. Will have him see PT, he has active order. Red flags were reviewed with the patient to RTC or notify me, expected time course for resolution reviewed. See AVS.  Reviewed OTC meds to use. Follow-up and Dispositions    · Return if symptoms worsen or fail to improve. Subjective:   Tova Hess was seen today for Knee Pain    Pain Review:  He presents do to pain of his right lower leg that has been present for everal months. He was seen on 10/7, had a steroid injection on the R knee. This has helped the knee pain. It did nothing for the lower leg pain. Since it started his pain has fluctuated. He describes the pain as sharp or stabbing. It is intermittent. He feels like his knee will give out on him. No locking up. No falls. He is swimming daily w/o issues. Exacerbating factors identifiable by patient are walking and driving. He has tried the following: rest, tylenol. These have been: effective. Prior to Admission medications    Medication Sig Start Date End Date Taking? Authorizing Provider   sildenafil, antihypertensive, (REVATIO) 20 mg tablet Take 1-5 tablets by mouth prior to sexual activity as needed. 6/6/19  Yes Hollis Rizzo MD   econazole nitrate (SPECTAZOLE) 1 % topical cream Apply to effected as needed twice a day. 3/6/19  Yes Hollis Rizzo MD   diclofenac (VOLTAREN) 1 % gel Apply  to affected area four (4) times daily as needed.    Yes Provider, Historical   OTHER TAKES OCCASIONAL OTC ALLERGY MEDICATION    Yes Provider, Historical          Allergies   Allergen Reactions    Lamisil [Terbinafine] Other (comments)     STOMACH ACHE    Other Medication Other (comments)     Does not want to take narcotics, hypersensitive           Review of Systems   Constitutional: Negative for weight loss. Cardiovascular: Negative for chest pain and palpitations. Musculoskeletal: Negative for back pain, joint pain and neck pain. Objective:   Physical Exam   Musculoskeletal:        Right knee: He exhibits decreased range of motion (due to pain w/ full extension) and swelling (generalized, mostly superior aspect). He exhibits no deformity. Tenderness (medial/posterior aspect) found. No lateral joint line and no patellar tendon tenderness noted. Right lower leg: He exhibits tenderness (medial & lateral down the whole length). He exhibits no bony tenderness, no swelling and no deformity. Visit Vitals  /66   Pulse 80   Temp 97.5 °F (36.4 °C)   Resp 16   Ht 5' 10\" (1.778 m)   Wt 192 lb (87.1 kg)   SpO2 96%   BMI 27.55 kg/m²         Disclaimer:  Advised him to call back or return to office if symptoms worsen/change/persist.  Discussed expected course/resolution/complications of diagnosis in detail with patient. Medication risks/benefits/costs/interactions/alternatives discussed with patient. He was given an after visit summary which includes diagnoses, current medications, & vitals. He expressed understanding with the diagnosis and plan. Aspects of this note may have been generated using voice recognition software. Despite editing, there may be some syntax errors.        Ashely Solis MD

## 2019-10-11 NOTE — PATIENT INSTRUCTIONS
Calf Strain: Rehab Exercises  Introduction  Here are some examples of exercises for you to try. The exercises may be suggested for a condition or for rehabilitation. Start each exercise slowly. Ease off the exercises if you start to have pain. You will be told when to start these exercises and which ones will work best for you. How to do the exercises  Calf wall stretch (back knee straight)    1. Stand facing a wall with your hands on the wall at about eye level. Put your affected leg about a step behind your other leg. 2. Keeping your back leg straight and your back heel on the floor, bend your front knee and gently bring your hip and chest toward the wall until you feel a stretch in the calf of your back leg. 3. Hold the stretch for at least 15 to 30 seconds. 4. Repeat 2 to 4 times. Calf wall stretch (knees bent)    1. Stand facing a wall with your hands on the wall at about eye level. Put your affected leg about a step behind your other leg. 2. Keeping both heels on the floor, bend both knees. Then gently bring your hip and chest toward the wall until you feel a stretch in the calf of your back leg. 3. Hold the stretch for at least 15 to 30 seconds. 4. Repeat 2 to 4 times. Bilateral calf stretch (knees straight)    1. Place a book on the floor a few inches from a wall or countertop, and put the balls of your feet on it. Your heels should be on the floor. The book needs to be thick enough so that you can feel a gentle stretch in each calf. If you are not steady on your feet, hold on to a chair, counter, or wall while you do this stretch. 2. Keep your knees straight, and lean forward until you feel a stretch in each calf. 3. To get more stretch, add another book or use a thicker book, such as a phone book, a dictionary, or an encyclopedia. 4. Hold the stretch for at least 15 to 30 seconds. 5. Repeat 2 to 4 times. Bilateral calf stretch (knees bent)    1.  Place a book on the floor a few inches from a wall or countertop, and put the balls of your feet on it. Your heels should be on the floor. The book needs to be thick enough so that you can feel a gentle stretch in each calf. If you are not steady on your feet, hold on to a chair, counter, or wall while you do this stretch. 2. Bend your knees, and lean forward until you feel a stretch in each calf. 3. To get more stretch, add another book or use a thicker book, such as a phone book, a dictionary, or an encyclopedia. 4. Hold the stretch for at least 15 to 30 seconds. 5. Repeat 2 to 4 times. Ankle plantarflexion    1. Sit with your affected leg straight and supported on the floor. Your other leg should be bent, with that foot flat on the floor. 2. Keeping your affected leg straight, gently flex your foot downward so your toes are pointed away from your body. Then slowly relax your foot to the starting position. 3. Repeat 8 to 12 times. Ankle dorsiflexion    1. Sit with your affected leg straight and supported on the floor. Your other leg should be bent, with that foot flat on the floor. 2. Keeping your leg straight, gently flex your foot back so your toes point upward. Then slowly relax your foot to the starting position. 3. Repeat 8 to 12 times. Bilateral heel raises on step    1. Stand on the bottom step of a staircase, facing up toward the stairs. Put the balls of your feet on the step. If you are not steady on your feet, hold on to the banister or wall. 2. Keeping both knees straight, slowly lift your heels above the step so that you are standing on your toes. Then slowly lower your heels below the step and toward the floor. 3. Return to the starting position, with your feet even with the step. 4. Repeat 8 to 12 times. Follow-up care is a key part of your treatment and safety. Be sure to make and go to all appointments, and call your doctor if you are having problems.  It's also a good idea to know your test results and keep a list of the medicines you take. Where can you learn more? Go to http://german-zafar.info/. Enter I568 in the search box to learn more about \"Calf Strain: Rehab Exercises. \"  Current as of: June 26, 2019  Content Version: 12.2  © 2324-3183 [employer]. Care instructions adapted under license by [employer] (which disclaims liability or warranty for this information). If you have questions about a medical condition or this instruction, always ask your healthcare professional. [address] any warranty or liability for your use of this information.

## 2019-10-16 ENCOUNTER — TELEPHONE (OUTPATIENT)
Dept: INTERNAL MEDICINE CLINIC | Age: 74
End: 2019-10-16

## 2019-10-16 NOTE — TELEPHONE ENCOUNTER
I will not have a chance to call him tonight. I will be in the office tomorrow for a few hrs in the morning, I will try calling him then. Otherwise it will be next week.

## 2019-10-18 NOTE — TELEPHONE ENCOUNTER
Returned patients call. He reports PT told him both legs are really weak, pathetically weak, and he is worried. It is not bothering him at all. He tried to do some stretches and had trouble. Will continue w/ exercises & PT, to reviewed when to see neuro or rheumatology.

## 2019-10-30 ENCOUNTER — CLINICAL SUPPORT (OUTPATIENT)
Dept: INTERNAL MEDICINE CLINIC | Age: 74
End: 2019-10-30

## 2019-10-30 DIAGNOSIS — Z23 ENCOUNTER FOR IMMUNIZATION: ICD-10-CM

## 2019-10-30 RX ORDER — FINASTERIDE 5 MG/1
0.5 TABLET, FILM COATED ORAL DAILY
Refills: 3 | COMMUNITY
Start: 2019-10-21 | End: 2022-01-14 | Stop reason: SDUPTHER

## 2019-10-30 NOTE — PATIENT INSTRUCTIONS
Vaccine Information Statement    Influenza (Flu) Vaccine (Inactivated or Recombinant): What You Need to Know    Many Vaccine Information Statements are available in Swedish and other languages. See www.immunize.org/vis  Hojas de información sobre vacunas están disponibles en español y en muchos otros idiomas. Visite www.immunize.org/vis    1. Why get vaccinated? Influenza vaccine can prevent influenza (flu). Flu is a contagious disease that spreads around the [facility] every year, usually between October and May. Anyone can get the flu, but it is more dangerous for some people. Infants and young children, people 72years of age and older, pregnant women, and people with certain health conditions or a weakened immune system are at greatest risk of flu complications. Pneumonia, bronchitis, sinus infections and ear infections are examples of flu-related complications. If you have a medical condition, such as heart disease, cancer or diabetes, flu can make it worse. Flu can cause fever and chills, sore throat, muscle aches, fatigue, cough, headache, and runny or stuffy nose. Some people may have vomiting and diarrhea, though this is more common in children than adults. Each year thousands of people in the [facility] die from flu, and many more are hospitalized. Flu vaccine prevents millions of illnesses and flu-related visits to the doctor each year. 2. Influenza vaccines     CDC recommends everyone 10months of age and older get vaccinated every flu season. Children 6 months through 6years of age may need 2 doses during a single flu season. Everyone else needs only 1 dose each flu season. It takes about 2 weeks for protection to develop after vaccination. There are many flu viruses, and they are always changing. Each year a new flu vaccine is made to protect against three or four viruses that are likely to cause disease in the upcoming flu season.  Even when the vaccine doesnt exactly match these viruses, it may still provide some protection. Influenza vaccine does not cause flu. Influenza vaccine may be given at the same time as other vaccines. 3. Talk with your health care provider    Tell your vaccine provider if the person getting the vaccine:   Has had an allergic reaction after a previous dose of influenza vaccine, or has any severe, life-threatening allergies.  Has ever had Guillain-Barré Syndrome (also called GBS). In some cases, your health care provider may decide to postpone influenza vaccination to a future visit. People with minor illnesses, such as a cold, may be vaccinated. People who are moderately or severely ill should usually wait until they recover before getting influenza vaccine. Your health care provider can give you more information. 4. Risks of a reaction     Soreness, redness, and swelling where shot is given, fever, muscle aches, and headache can happen after influenza vaccine.  There may be a very small increased risk of Guillain-Barré Syndrome (GBS) after inactivated influenza vaccine (the flu shot). Lucila Sodus children who get the flu shot along with pneumococcal vaccine (PCV13), and/or DTaP vaccine at the same time might be slightly more likely to have a seizure caused by fever. Tell your health care provider if a child who is getting flu vaccine has ever had a seizure. People sometimes faint after medical procedures, including vaccination. Tell your provider if you feel dizzy or have vision changes or ringing in the ears. As with any medicine, there is a very remote chance of a vaccine causing a severe allergic reaction, other serious injury, or death. 5. What if there is a serious problem? An allergic reaction could occur after the vaccinated person leaves the clinic.  If you see signs of a severe allergic reaction (hives, swelling of the face and throat, difficulty breathing, a fast heartbeat, dizziness, or weakness), call 9-1-1 and get the person to the nearest hospital.    For other signs that concern you, call your health care provider. Adverse reactions should be reported to the Vaccine Adverse Event Reporting System (VAERS). Your health care provider will usually file this report, or you can do it yourself. Visit the VAERS website at www.vaers. [facility].gov or call 9-199.694.1919. VAERS is only for reporting reactions, and VAERS staff do not give medical advice. 6. The National Vaccine Injury Compensation Program    The [facility] Vaccine Injury Compensation Program (VICP) is a federal program that was created to compensate people who may have been injured by certain vaccines. Visit the VICP website at www.[facility].gov/vaccinecompensation or call 5-824.481.4429 to learn about the program and about filing a claim. There is a time limit to file a claim for compensation. 7. How can I learn more?  Ask your health care provider.  Call your local or state health department.  Contact the Centers for Disease Control and Prevention (CDC):  - Call 2-627.845.1324 (1-800-CDC-INFO) or  - Visit CDCs influenza website at www.cdc.gov/flu    Vaccine Information Statement (Interim)  Inactivated Influenza Vaccine   8/15/2019  42 TAMMY Hatch Presume 701IL-02   Department of Health and Human Services  Centers for Disease Control and Prevention    Office Use Only

## 2019-11-01 NOTE — PROGRESS NOTES
Pharmacy Note - Immunizations    Jerri Simms is a 69 y.o.  male  who present for a flu shot. He denies any symptoms, reactions or allergies that would exclude them from being immunized today. Risks and adverse reactions were discussed and the VIS was given to them. All questions were addressed. Verbal order received from Dr. Radha Lau    Patient was observed for 10 min post injection. There were no reactions observed.     Marc Earl, PHARMD BCACP

## 2020-01-07 ENCOUNTER — TELEPHONE (OUTPATIENT)
Dept: INTERNAL MEDICINE CLINIC | Age: 75
End: 2020-01-07

## 2020-01-07 ENCOUNTER — OFFICE VISIT (OUTPATIENT)
Dept: INTERNAL MEDICINE CLINIC | Age: 75
End: 2020-01-07

## 2020-01-07 VITALS
BODY MASS INDEX: 26.83 KG/M2 | HEIGHT: 70 IN | WEIGHT: 187.4 LBS | DIASTOLIC BLOOD PRESSURE: 60 MMHG | SYSTOLIC BLOOD PRESSURE: 110 MMHG | HEART RATE: 70 BPM | TEMPERATURE: 97.7 F | RESPIRATION RATE: 16 BRPM | OXYGEN SATURATION: 96 %

## 2020-01-07 DIAGNOSIS — R91.8 ABNORMAL CT SCAN OF LUNG: ICD-10-CM

## 2020-01-07 DIAGNOSIS — E66.3 OVERWEIGHT (BMI 25.0-29.9): ICD-10-CM

## 2020-01-07 DIAGNOSIS — Z71.89 ADVANCED CARE PLANNING/COUNSELING DISCUSSION: ICD-10-CM

## 2020-01-07 DIAGNOSIS — Z12.11 COLON CANCER SCREENING: ICD-10-CM

## 2020-01-07 DIAGNOSIS — Z13.39 SCREENING FOR ALCOHOLISM: ICD-10-CM

## 2020-01-07 DIAGNOSIS — Z13.31 SCREENING FOR DEPRESSION: ICD-10-CM

## 2020-01-07 DIAGNOSIS — Z00.00 MEDICARE ANNUAL WELLNESS VISIT, SUBSEQUENT: Primary | ICD-10-CM

## 2020-01-07 NOTE — PATIENT INSTRUCTIONS

## 2020-01-07 NOTE — ACP (ADVANCE CARE PLANNING)
Advance Care Planning (ACP) Provider Note - Comprehensive     Date of ACP Conversation: 01/07/20  Persons included in Conversation:  patient  Length of ACP Conversation in minutes: <16 minutes (Non-Billable)    Authorized Decision Maker (if patient is incapable of making informed decisions):   Healthcare Agent/Medical Power of  under Advance Directive      He has an advanced directive - a copy has been provided & still reflects him wishes. Reviewed DNR/DNI and patient is not interested. General ACP for ALL Patients with Decision Making Capacity:  Importance of advance care planning, including choosing a healthcare agent to communicate patient's healthcare decisions if patient lost the ability to make decisions, such as after a sudden illness or accident  Understanding of the healthcare agent role was assessed and information provided  Opportunity offered to explore how cultural, Orthodoxy, spiritual, or personal beliefs would affect decisions for future care  Exploration of values, goals, and preferences if recovery is not expected, even with continued medical treatment in the event of: Imminent death or severe, permanent brain injury    For Serious or Chronic Illness:  Understanding of CPR, goals and expected outcomes, benefits and burdens discussed.   Understanding of medical condition  Information on CPR success rates provided (e.g. for CPR in hospital, survival to d/c at two weeks is 22%, for chronically ill or elderly/frail survival is less than 3%)    Interventions Provided:  Recommended communicating the plan and making copies for the healthcare agent, personal physician, and others as appropriate (e.g., health system)  Recommended review of completed ACP document annually or upon change in health status

## 2020-01-07 NOTE — PROGRESS NOTES
Twila Duran is a 69 y.o. male who was seen in clinic today (1/7/2020) for a full physical.          Assessment & Plan:   Diagnoses and all orders for this visit:    1. Medicare annual wellness visit, subsequent    2. Advanced care planning/counseling discussion    3. Screening for alcoholism  -     MD ANNUAL ALCOHOL SCREEN 15 MIN    4. Screening for depression  -     DEPRESSION SCREEN ANNUAL    5. Colon cancer screening  -     REFERRAL TO GASTROENTEROLOGY    6. Abnormal CT scan of lung- reviewed previous imaging, reviewed pros/cons to repeat imaging, he had a lot of questions, he has f/u with oncologist coming up at [address] [place] [address] and will d/w him need to f/u.    7. Overweight (BMI 25.0-29. 9)- stable and weight is acceptable, I have recommended the following interventions: encourage exercise and lifestyle education regarding diet. Follow-up and Dispositions    · Return in about 1 year (around 1/7/2021), or if symptoms worsen or fail to improve, for FULL PHYSICAL - 30 minutes. ------------------------------------------------------------------------------------------  Subjective: Annual Wellness Visit- Subsequent Visit    End of Life Planning: This was discussed with him today and he has an advanced directive - a copy has been provided. Reviewed DNR/DNI and patient is not interested.       Depression Screen:  3 most recent PHQ Screens 1/7/2020   Little interest or pleasure in doing things Not at all   Feeling down, depressed, irritable, or hopeless Not at all   Total Score PHQ 2 0   Trouble falling or staying asleep, or sleeping too much -   Feeling tired or having little energy -   Poor appetite, weight loss, or overeating -   Feeling bad about yourself - or that you are a failure or have let yourself or your family down -   Trouble concentrating on things such as school, work, reading, or watching TV -   Moving or speaking so slowly that other people could have noticed; or the opposite being so fidgety that others notice -   Thoughts of being better off dead, or hurting yourself in some way -   PHQ 9 Score -   How difficult have these problems made it for you to do your work, take care of your home and get along with others -         Fall Risk:   Fall Risk Assessment, last 12 mths 1/7/2020   Able to walk? Yes   Fall in past 12 months? No       Abuse Screen:  Abuse Screening Questionnaire 1/7/2020   Do you ever feel afraid of your partner? N   Are you in a relationship with someone who physically or mentally threatens you? N   Is it safe for you to go home? Y       Alcohol Risk Factor Screening:  Alcohol Risk Factor Screening (MALE > 65): Do you average more 1 drink per night or more than 7 drinks a week: No    In the past three months have you have had more than 4 drinks containing alcohol on one occasion: No    Functional Ability and Level of Safety:   Hearing Screen: Hearing is good. Cognition Screen:  Has your family/caregiver stated any concerns about your memory: no    Ambulation: with no difficulty    Activities of Daily Living:    Exercise: very active  The home contains: no safety equipment. Patient does total self care    Adult Nutrition Screen:  No risk factors noted.      Health Maintenance:   Daily Aspirin: no  AAA Screening: n/a: never smoked  Glaucoma Screening: UTD    Immunizations:    Influenza: up to date   Tetanus: up to date   Shingles: up to date   Pneumonia: up to date  Cancer screening:   Prostate: guidelines reviewed, monitored by urologist  Colon: guidelines reviewed, referral placed for colonoscopy      Patient Care Team:  Kyra Boyd MD as PCP - General (Internal Medicine)  Kyra Boyd MD as PCP - [facility] Empaneled Provider  Brenton Tomlin MD (Urology)  Wil Avilez MD (Hematology and Oncology)  Fely Franklin MD (Pulmonary Disease)  Kaushal Juarez MD (General Surgery)  Ben Morris, [address], OD (Optometry)       The following sections were reviewed & updated as appropriate: PMH, PSH, FH, and SH. Patient Active Problem List   Diagnosis Code    Abnormal CT scan of lung R91.8    GERD (gastroesophageal reflux disease) K21.9    Tinnitus of left ear H93.12          Prior to Admission medications    Medication Sig Start Date End Date Taking? Authorizing Provider   finasteride (PROSCAR) 5 mg tablet Take 0.5 Tabs by mouth daily. 10/21/19  Yes Provider, Historical   sildenafil, antihypertensive, (REVATIO) 20 mg tablet Take 1-5 tablets by mouth prior to sexual activity as needed. 6/6/19  Yes Harry Gibbs MD   econazole nitrate (SPECTAZOLE) 1 % topical cream Apply to effected as needed twice a day. 3/6/19  Yes Harry Gibbs MD   diclofenac (VOLTAREN) 1 % gel Apply  to affected area four (4) times daily as needed. Yes Provider, Historical   OTHER TAKES OCCASIONAL OTC ALLERGY MEDICATION    Yes Provider, Historical          Allergies   Allergen Reactions    Lamisil [Terbinafine] Other (comments)     STOMACH ACHE    Other Medication Other (comments)     Does not want to take narcotics, hypersensitive              Review of Systems   Constitutional: Negative for chills, fever and malaise/fatigue (resolved). Respiratory: Negative for cough and shortness of breath. Cardiovascular: Negative for chest pain and palpitations. Gastrointestinal: Negative for abdominal pain, blood in stool, constipation, diarrhea, heartburn, nausea and vomiting. Genitourinary:        He denies: nocturia, urinary hesitancy, urinary frequency, weak stream.       Reports trouble getting an erection or maintaining an erection. Reports trouble with AM erections. Not using medications regularly. Musculoskeletal: Negative for joint pain and myalgias (R leg & R knee pain resolved). Neurological: Negative for tingling, focal weakness and headaches. Endo/Heme/Allergies: Does not bruise/bleed easily. Psychiatric/Behavioral: Negative for depression.  The patient is not nervous/anxious and does not have insomnia. Objective:   Physical Exam  Constitutional:       General: He is not in acute distress. Appearance: Normal appearance. Eyes:      Conjunctiva/sclera: Conjunctivae normal.   Cardiovascular:      Rate and Rhythm: Regular rhythm. Heart sounds: No murmur. Pulmonary:      Effort: Pulmonary effort is normal.      Breath sounds: Normal breath sounds. No decreased breath sounds or wheezing. Abdominal:      General: Bowel sounds are normal.      Palpations: Abdomen is soft. Tenderness: There is no tenderness. Musculoskeletal:      Right lower leg: No edema. Left lower leg: No edema. Psychiatric:         Mood and Affect: Mood and affect normal.            Visit Vitals  /60   Pulse 70   Temp 97.7 °F (36.5 °C) (Oral)   Resp 16   Ht 5' 9.5\" (1.765 m)   Wt 187 lb 6.4 oz (85 kg)   SpO2 96%   BMI 27.28 kg/m²          Advised him to call back or return to office if symptoms worsen/change/persist.  Discussed expected course/resolution/complications of diagnosis in detail with patient. Medication risks/benefits/costs/interactions/alternatives discussed with patient. He was given an after visit summary which includes diagnoses, current medications, & vitals. He expressed understanding with the diagnosis and plan. Aspects of this note may have been generated using voice recognition software. Despite editing, there may be some syntax errors.        Allan Castro MD

## 2020-02-26 ENCOUNTER — ANESTHESIA (OUTPATIENT)
Dept: ENDOSCOPY | Age: 75
End: 2020-02-26
Payer: MEDICARE

## 2020-02-26 ENCOUNTER — ANESTHESIA EVENT (OUTPATIENT)
Dept: ENDOSCOPY | Age: 75
End: 2020-02-26
Payer: MEDICARE

## 2020-02-26 ENCOUNTER — HOSPITAL ENCOUNTER (OUTPATIENT)
Age: 75
Setting detail: OUTPATIENT SURGERY
Discharge: HOME HEALTH | End: 2020-02-26
Attending: INTERNAL MEDICINE | Admitting: INTERNAL MEDICINE
Payer: MEDICARE

## 2020-02-26 VITALS
DIASTOLIC BLOOD PRESSURE: 75 MMHG | SYSTOLIC BLOOD PRESSURE: 122 MMHG | HEART RATE: 77 BPM | BODY MASS INDEX: 25.9 KG/M2 | OXYGEN SATURATION: 95 % | RESPIRATION RATE: 11 BRPM | HEIGHT: 71 IN | TEMPERATURE: 98 F | WEIGHT: 185 LBS

## 2020-02-26 LAB — COLONOSCOPY, EXTERNAL: NORMAL

## 2020-02-26 PROCEDURE — 74011000250 HC RX REV CODE- 250: Performed by: NURSE ANESTHETIST, CERTIFIED REGISTERED

## 2020-02-26 PROCEDURE — 74011250637 HC RX REV CODE- 250/637: Performed by: INTERNAL MEDICINE

## 2020-02-26 PROCEDURE — 74011250636 HC RX REV CODE- 250/636: Performed by: NURSE ANESTHETIST, CERTIFIED REGISTERED

## 2020-02-26 PROCEDURE — 76040000019: Performed by: INTERNAL MEDICINE

## 2020-02-26 PROCEDURE — 76060000031 HC ANESTHESIA FIRST 0.5 HR: Performed by: INTERNAL MEDICINE

## 2020-02-26 RX ORDER — FLUMAZENIL 0.1 MG/ML
0.2 INJECTION INTRAVENOUS
Status: DISCONTINUED | OUTPATIENT
Start: 2020-02-26 | End: 2020-02-26 | Stop reason: HOSPADM

## 2020-02-26 RX ORDER — DEXTROMETHORPHAN/PSEUDOEPHED 2.5-7.5/.8
1.2 DROPS ORAL
Status: DISCONTINUED | OUTPATIENT
Start: 2020-02-26 | End: 2020-02-26 | Stop reason: HOSPADM

## 2020-02-26 RX ORDER — EPINEPHRINE 0.1 MG/ML
1 INJECTION INTRACARDIAC; INTRAVENOUS
Status: DISCONTINUED | OUTPATIENT
Start: 2020-02-26 | End: 2020-02-26 | Stop reason: HOSPADM

## 2020-02-26 RX ORDER — FENTANYL CITRATE 50 UG/ML
25-200 INJECTION, SOLUTION INTRAMUSCULAR; INTRAVENOUS
Status: DISCONTINUED | OUTPATIENT
Start: 2020-02-26 | End: 2020-02-26 | Stop reason: HOSPADM

## 2020-02-26 RX ORDER — SODIUM CHLORIDE 9 MG/ML
INJECTION, SOLUTION INTRAVENOUS
Status: DISCONTINUED | OUTPATIENT
Start: 2020-02-26 | End: 2020-02-26 | Stop reason: HOSPADM

## 2020-02-26 RX ORDER — PROPOFOL 10 MG/ML
INJECTION, EMULSION INTRAVENOUS AS NEEDED
Status: DISCONTINUED | OUTPATIENT
Start: 2020-02-26 | End: 2020-02-26 | Stop reason: HOSPADM

## 2020-02-26 RX ORDER — MIDAZOLAM HYDROCHLORIDE 1 MG/ML
.25-5 INJECTION, SOLUTION INTRAMUSCULAR; INTRAVENOUS
Status: DISCONTINUED | OUTPATIENT
Start: 2020-02-26 | End: 2020-02-26 | Stop reason: HOSPADM

## 2020-02-26 RX ORDER — NALOXONE HYDROCHLORIDE 0.4 MG/ML
0.4 INJECTION, SOLUTION INTRAMUSCULAR; INTRAVENOUS; SUBCUTANEOUS
Status: DISCONTINUED | OUTPATIENT
Start: 2020-02-26 | End: 2020-02-26 | Stop reason: HOSPADM

## 2020-02-26 RX ORDER — SODIUM CHLORIDE 9 MG/ML
50 INJECTION, SOLUTION INTRAVENOUS CONTINUOUS
Status: DISCONTINUED | OUTPATIENT
Start: 2020-02-26 | End: 2020-02-26 | Stop reason: HOSPADM

## 2020-02-26 RX ORDER — LIDOCAINE HYDROCHLORIDE 20 MG/ML
INJECTION, SOLUTION EPIDURAL; INFILTRATION; INTRACAUDAL; PERINEURAL AS NEEDED
Status: DISCONTINUED | OUTPATIENT
Start: 2020-02-26 | End: 2020-02-26 | Stop reason: HOSPADM

## 2020-02-26 RX ORDER — SODIUM CHLORIDE 0.9 % (FLUSH) 0.9 %
5-40 SYRINGE (ML) INJECTION EVERY 8 HOURS
Status: DISCONTINUED | OUTPATIENT
Start: 2020-02-26 | End: 2020-02-26 | Stop reason: HOSPADM

## 2020-02-26 RX ORDER — SODIUM CHLORIDE 0.9 % (FLUSH) 0.9 %
5-40 SYRINGE (ML) INJECTION AS NEEDED
Status: DISCONTINUED | OUTPATIENT
Start: 2020-02-26 | End: 2020-02-26 | Stop reason: HOSPADM

## 2020-02-26 RX ORDER — ATROPINE SULFATE 0.1 MG/ML
0.5 INJECTION INTRAVENOUS
Status: DISCONTINUED | OUTPATIENT
Start: 2020-02-26 | End: 2020-02-26 | Stop reason: HOSPADM

## 2020-02-26 RX ADMIN — PROPOFOL 30 MG: 10 INJECTION, EMULSION INTRAVENOUS at 15:27

## 2020-02-26 RX ADMIN — LIDOCAINE HYDROCHLORIDE 40 MG: 20 INJECTION, SOLUTION EPIDURAL; INFILTRATION; INTRACAUDAL; PERINEURAL at 15:22

## 2020-02-26 RX ADMIN — SODIUM CHLORIDE: 9 INJECTION, SOLUTION INTRAVENOUS at 15:17

## 2020-02-26 RX ADMIN — PROPOFOL 70 MG: 10 INJECTION, EMULSION INTRAVENOUS at 15:23

## 2020-02-26 RX ADMIN — PROPOFOL 50 MG: 10 INJECTION, EMULSION INTRAVENOUS at 15:33

## 2020-02-26 NOTE — ANESTHESIA POSTPROCEDURE EVALUATION
Post-Anesthesia Evaluation and Assessment    Patient: Willy Cintron MRN: [id number]  SSN: [id number]    YOB: 1945  Age: 70 y.o. Sex: male      I have evaluated the patient and they are stable and ready for discharge from the PACU. Cardiovascular Function/Vital Signs  Visit Vitals  /71 (BP 1 Location: Left arm, BP Patient Position: At rest)   Pulse 74   Temp 36.7 °C (98 °F)   Resp 15   Ht 5' 11\" (1.803 m)   Wt 83.9 kg (185 lb)   SpO2 96%   BMI 25.80 kg/m²       Patient is status post MAC anesthesia for Procedure(s):  COLONOSCOPY. Nausea/Vomiting: None    Postoperative hydration reviewed and adequate. Pain:  Pain Scale 1: Numeric (0 - 10) (02/26/20 1538)  Pain Intensity 1: 0 (02/26/20 1538)   Managed    Neurological Status: At baseline    Mental Status, Level of Consciousness: Alert and  oriented to person, place, and time    Pulmonary Status:   O2 Device: CO2 nasal cannula (02/26/20 1538)   Adequate oxygenation and airway patent    Complications related to anesthesia: None    Post-anesthesia assessment completed.  No concerns    Signed By: Charlie Villagran MD     February 26, 2020

## 2020-02-26 NOTE — DISCHARGE INSTRUCTIONS
[facility]    COLON DISCHARGE INSTRUCTIONS    Kayla Muñoz  [id number]  1945    Discomfort:  Redness at IV site- apply warm compress to area; if redness or soreness persist- contact your physician  There may be a slight amount of blood passed from the rectum  Gaseous discomfort- walking, belching will help relieve any discomfort  You may not operate a vehicle for 12 hours  You may not engage in an occupation involving machinery or appliances for rest of today  You may not drink alcoholic beverages for at least 12 hours  Avoid making any critical decisions for at least 24 hour  DIET:  You may resume your regular diet - however -  remember your colon is empty and a heavy meal will produce gas. Avoid these foods:  vegetables, fried / greasy foods, carbonated drinks     ACTIVITY:  You may  resume your normal daily activities it is recommended that you spend the remainder of the day resting -  avoid any strenuous activity. CALL M.D. ANY SIGN OF:   Increasing pain, nausea, vomiting  Abdominal distension (swelling)  New increased bleeding (oral or rectal)  Fever (chills)  Pain in chest area  Bloody discharge from nose or mouth  Shortness of breath      Follow-up Instructions:   Call Dr. Taylor Glover for any questions or problems at [phone]   High fiber diet          ENDOSCOPY FINDINGS:   Your colonoscopy showed diverticulosis and small internal hemorrhoids.   Telephone # [phone]      Signed By: Taylor Glover MD     2/26/2020  3:43 PM       DISCHARGE SUMMARY from Nurse    The following personal items collected during your admission are returned to you:   Dental Appliance: Dental Appliances: None  Vision: Visual Aid: None  Hearing Aid:    Jewelry:    Clothing:    Other Valuables:    Valuables sent to safe:

## 2020-02-26 NOTE — PERIOP NOTES

## 2020-02-26 NOTE — PROCEDURES
[address]  174 [facility], [address] [place] [address]      Colonoscopy Operative Report    Princess Graves  [id number]  1945      Procedure Type:   Colonoscopy --diagnostic     Indications:    Screening colonoscopy   Date of last colonoscopy: 2009, Polyps  No    Pre-operative Diagnosis: see indication above    Post-operative Diagnosis:  See findings below    :  Mia Longoria MD    Surgical Assistant: None    Implants:  None    Referring Provider: Heriberto Hayes MD      Sedation:  MAC anesthesia Propofol      Procedure Details:  After informed consent was obtained with all risks and benefits of procedure explained and preoperative exam completed, the patient was taken to the endoscopy suite and placed in the left lateral decubitus position. Upon sequential sedation as per above, a digital rectal exam was performed demonstrating internal hemorrhoids. The Olympus videocolonoscope  was inserted in the rectum and carefully advanced to the cecum, which was identified by the ileocecal valve and appendiceal orifice. The cecum was identified by the ileocecal valve and appendiceal orifice. The quality of preparation was good. The colonoscope was slowly withdrawn with careful evaluation between folds. Retroflexion in the rectum was completed . Findings:   Rectum: normal  Grade 2 non bleeding internal hemorrhoids  Sigmoid: mild diverticulosis  Descending Colon: normal  Transverse Colon: normal  Ascending Colon: normal  Cecum: normal        Specimen Removed:  none    Complications: None. EBL:  None. Impression:    see findings    Recommendations: --For colon cancer screening in this average-risk patient, colonoscopy may be repeated in 10 years.     High fiber diet    Signed By: Mia Longoria MD     2/26/2020  3:41 PM

## 2020-02-26 NOTE — H&P
[address]  174 [facility], [address] [place] [address]      History and Physical       NAME:  Cyndi Reddy   :   1945   MRN:   [id number]             History of Present Illness:  Patient is a 70 y.o. who is seen for screening colonoscopy. PMH:  Past Medical History:   Diagnosis Date    Cholelithiasis 10/26/2011    s/p surgery    GERD (gastroesophageal reflux disease)     Painful ejaculation 3/24/2015    improved, no obvious etiology    Pure hypercholesterolemia 2016    Seminoma of right testis, stage 1 ([address]     (testicular cancer)    Tinnitus of left ear     attributed to anesthesia after orchiectomy surgery    Unspecified adverse effect of anesthesia     TINNITUS-GOT TWICE THE USUAL DOSE FOR ORCHIECTOMY       PSH:  Past Surgical History:   Procedure Laterality Date    HX COLONOSCOPY  03    hyperplastic polyp    HX COLONOSCOPY  3/11/09    normal    HX HAIR TRANSPLANT  2019    HX HEENT  2004    Left stapedectomy    HX HERNIA REPAIR Bilateral     inguinal hernia repair    HX LAP CHOLECYSTECTOMY      HX ORCHIECTOMY Right     h/o seminoma    HX ORTHOPAEDIC Left     bicept tendon repair    HX ORTHOPAEDIC Right 2005    bicept tendon repair    HX OTHER SURGICAL Bilateral 2019    Tenotomy (surgery on 5th toe, tendon severed due to curvature)       Allergies: Allergies   Allergen Reactions    Lamisil [Terbinafine] Other (comments)     STOMACH ACHE    Other Medication Other (comments)     Does not want to take narcotics, hypersensitive       Home Medications:  Prior to Admission Medications   Prescriptions Last Dose Informant Patient Reported? Taking?    OTHER Unknown at Unknown time  Yes No   Sig: TAKES OCCASIONAL OTC ALLERGY MEDICATION    diclofenac (VOLTAREN) 1 % gel Unknown at Unknown time  Yes No   Sig: Apply  to affected area four (4) times daily as needed.   econazole nitrate (SPECTAZOLE) 1 % topical cream Unknown at Unknown time  No No   Sig: Apply to effected as needed twice a day. finasteride (PROSCAR) 5 mg tablet 2/25/2020 at Unknown time  Yes Yes   Sig: Take 0.5 Tabs by mouth daily. sildenafil, antihypertensive, (REVATIO) 20 mg tablet Unknown at Unknown time  No No   Sig: Take 1-5 tablets by mouth prior to sexual activity as needed.       Facility-Administered Medications: None       Hospital Medications:  Current Facility-Administered Medications   Medication Dose Route Frequency    0.9% sodium chloride infusion  50 mL/hr IntraVENous CONTINUOUS    sodium chloride (NS) flush 5-40 mL  5-40 mL IntraVENous Q8H    sodium chloride (NS) flush 5-40 mL  5-40 mL IntraVENous PRN    midazolam (VERSED) injection 0.25-5 mg  0.25-5 mg IntraVENous Multiple    fentaNYL citrate (PF) injection  mcg   mcg IntraVENous Multiple    naloxone (NARCAN) injection 0.4 mg  0.4 mg IntraVENous Multiple    flumazeniL (ROMAZICON) 0.1 mg/mL injection 0.2 mg  0.2 mg IntraVENous Multiple    simethicone (MYLICON) 23FY/3.6TT oral drops 80 mg  1.2 mL Oral Multiple    atropine injection 0.5 mg  0.5 mg IntraVENous ONCE PRN    EPINEPHrine (ADRENALIN) 0.1 mg/mL syringe 1 mg  1 mg Endoscopically ONCE PRN       Social History:  Social History     Tobacco Use    Smoking status: Never Smoker    Smokeless tobacco: Never Used   Substance Use Topics    Alcohol use: Yes     Frequency: Monthly or less     Drinks per session: 1 or 2     Binge frequency: Never     Comment: 1/2 glass of wine per month       Family History:  Family History   Problem Relation Age of Onset    Arthritis-osteo Mother     Other Mother         CONFUSION AFTER HIP SURG AT AGE 80    Heart Attack Father     Cancer Sister         breast    Other Brother         hearing loss    Other Sister         hearing loss    Atrial Fibrillation Sister              Review of Systems:      Constitutional: negative fever, negative chills, negative weight loss  Eyes:   negative visual changes  ENT:   negative sore throat, tongue or lip swelling  Respiratory:  negative cough, negative dyspnea  Cards:  negative for chest pain, palpitations, lower extremity edema  GI:   See HPI  :  negative for frequency, dysuria  Integument:  negative for rash and pruritus  Heme:  negative for easy bruising and gum/nose bleeding  Musculoskel: negative for myalgias,  back pain and muscle weakness  Neuro: negative for headaches, dizziness, vertigo  Psych:  negative for feelings of anxiety, depression       Objective:     Patient Vitals for the past 8 hrs:   BP Temp Pulse Resp SpO2 Height Weight   02/26/20 1447 124/75 98 °F (36.7 °C) 77 18 94 % 5' 11\" (1.803 m) 83.9 kg (185 lb)     No intake/output data recorded. No intake/output data recorded. EXAM:     NEURO-a&o   HEENT-wnl   LUNGS-clear    COR-regular rate and rhythym     ABD-soft , no tenderness, no rebound, good bs     EXT-no edema     Data Review     No results for input(s): WBC, HGB, HCT, PLT, HGBEXT, HCTEXT, PLTEXT in the last 72 hours. No results for input(s): NA, K, CL, CO2, BUN, CREA, GLU, PHOS, CA in the last 72 hours. No results for input(s): SGOT, GPT, AP, TBIL, TP, ALB, GLOB, GGT, AML, LPSE in the last 72 hours. No lab exists for component: AMYP, HLPSE  No results for input(s): INR, PTP, APTT, INREXT in the last 72 hours.        Assessment:   · Screening colonoscopy      Patient Active Problem List   Diagnosis Code    Abnormal CT scan of lung R91.8    GERD (gastroesophageal reflux disease) K21.9    Tinnitus of left ear H93.12           Plan:   · Endoscopic procedure with sedation     Signed By: Fidelia Kenney MD     2/26/2020  3:17 PM

## 2020-07-21 RX ORDER — ECONAZOLE NITRATE 10 MG/G
CREAM TOPICAL
Qty: 15 G | Refills: 0 | Status: SHIPPED | OUTPATIENT
Start: 2020-07-21 | End: 2021-03-19 | Stop reason: SDUPTHER

## 2020-07-22 ENCOUNTER — TELEPHONE (OUTPATIENT)
Dept: INTERNAL MEDICINE CLINIC | Age: 75
End: 2020-07-22

## 2020-07-22 NOTE — TELEPHONE ENCOUNTER
Cheko Rodrigues (Self) [phone] (M)     Pt is requesting a [facility]   Pt cannot remember who prescribed this med for him

## 2020-07-23 RX ORDER — FLUOCINONIDE 0.5 MG/G
OINTMENT TOPICAL 2 TIMES DAILY
Qty: 15 G | Refills: 0 | Status: SHIPPED | OUTPATIENT
Start: 2020-07-23 | End: 2021-03-19 | Stop reason: SDUPTHER

## 2020-10-06 ENCOUNTER — OFFICE VISIT (OUTPATIENT)
Dept: INTERNAL MEDICINE CLINIC | Age: 75
End: 2020-10-06
Payer: MEDICARE

## 2020-10-06 VITALS — TEMPERATURE: 97.3 F

## 2020-10-06 DIAGNOSIS — Z23 NEEDS FLU SHOT: Primary | ICD-10-CM

## 2020-10-06 PROCEDURE — 90694 VACC AIIV4 NO PRSRV 0.5ML IM: CPT | Performed by: INTERNAL MEDICINE

## 2020-10-06 RX ORDER — ALCLOMETASONE DIPROPIONATE 0.5 MG/G
CREAM TOPICAL 2 TIMES DAILY
COMMUNITY
Start: 2020-09-30 | End: 2021-09-10 | Stop reason: ALTCHOICE

## 2020-10-06 NOTE — PROGRESS NOTES
Pharmacy Note - Immunizations    Lizette Van is a 70 y.o.  male  who present for a flu shot. He denies any symptoms, reactions or allergies that would exclude them from being immunized today. Risks and adverse reactions were discussed and the VIS was given to them. All questions were addressed. Verbal order received from Dr. Cecilia Newby    Patient was observed for 10 min post injection. There were no reactions observed.     Atul Barney PHARMD BCACP    CLINICAL PHARMACY CONSULT: MED RECONCILIATION/REVIEW ADDENDUM    For Pharmacy Admin Tracking Only    PHSO: PHSO Patient?: Yes  Total # of Interventions Recommended: Count: 1  Total Interventions Accepted: 1  Time Spent (min): 15    Rachana Aguayo PHARMD, BCACP

## 2020-10-06 NOTE — PATIENT INSTRUCTIONS
Vaccine Information Statement Influenza (Flu) Vaccine (Inactivated or Recombinant): What You Need to Know Many Vaccine Information Statements are available in Georgian and other languages. See www.immunize.org/vis Hojas de información sobre vacunas están disponibles en español y en muchos otros idiomas. Visite www.immunize.org/vis 1. Why get vaccinated? Influenza vaccine can prevent influenza (flu). Flu is a contagious disease that spreads around the [facility] every year, usually between October and May. Anyone can get the flu, but it is more dangerous for some people. Infants and young children, people 72years of age and older, pregnant women, and people with certain health conditions or a weakened immune system are at greatest risk of flu complications. Pneumonia, bronchitis, sinus infections and ear infections are examples of flu-related complications. If you have a medical condition, such as heart disease, cancer or diabetes, flu can make it worse. Flu can cause fever and chills, sore throat, muscle aches, fatigue, cough, headache, and runny or stuffy nose. Some people may have vomiting and diarrhea, though this is more common in children than adults. Each year thousands of people in the [facility] die from flu, and many more are hospitalized. Flu vaccine prevents millions of illnesses and flu-related visits to the doctor each year. 2. Influenza vaccines CDC recommends everyone 10months of age and older get vaccinated every flu season. Children 6 months through 6years of age may need 2 doses during a single flu season. Everyone else needs only 1 dose each flu season. It takes about 2 weeks for protection to develop after vaccination. There are many flu viruses, and they are always changing. Each year a new flu vaccine is made to protect against three or four viruses that are likely to cause disease in the upcoming flu season.  Even when the vaccine doesnt exactly match these viruses, it may still provide some protection. Influenza vaccine does not cause flu. Influenza vaccine may be given at the same time as other vaccines. 3. Talk with your health care provider Tell your vaccine provider if the person getting the vaccine: 
 Has had an allergic reaction after a previous dose of influenza vaccine, or has any severe, life-threatening allergies.  Has ever had Guillain-Barré Syndrome (also called GBS). In some cases, your health care provider may decide to postpone influenza vaccination to a future visit. People with minor illnesses, such as a cold, may be vaccinated. People who are moderately or severely ill should usually wait until they recover before getting influenza vaccine. Your health care provider can give you more information. 4. Risks of a reaction  Soreness, redness, and swelling where shot is given, fever, muscle aches, and headache can happen after influenza vaccine.  There may be a very small increased risk of Guillain-Barré Syndrome (GBS) after inactivated influenza vaccine (the flu shot). [place] Du children who get the flu shot along with pneumococcal vaccine (PCV13), and/or DTaP vaccine at the same time might be slightly more likely to have a seizure caused by fever. Tell your health care provider if a child who is getting flu vaccine has ever had a seizure. People sometimes faint after medical procedures, including vaccination. Tell your provider if you feel dizzy or have vision changes or ringing in the ears. As with any medicine, there is a very remote chance of a vaccine causing a severe allergic reaction, other serious injury, or death. 5. What if there is a serious problem? An allergic reaction could occur after the vaccinated person leaves the clinic.  If you see signs of a severe allergic reaction (hives, swelling of the face and throat, difficulty breathing, a fast heartbeat, dizziness, or weakness), call 9-1-1 and get the person to the nearest hospital. 
 
For other signs that concern you, call your health care provider. Adverse reactions should be reported to the Vaccine Adverse Event Reporting System (VAERS). Your health care provider will usually file this report, or you can do it yourself. Visit the VAERS website at www.vaers. hhs.gov or call 3-561.982.8258. VAERS is only for reporting reactions, and VAERS staff do not give medical advice. 6. The National Vaccine Injury Compensation Program 
 
The [facility] Vaccine Injury Compensation Program (VICP) is a federal program that was created to compensate people who may have been injured by certain vaccines. Visit the VICP website at www.hrsa.gov/vaccinecompensation or call 1-258.717.8958 to learn about the program and about filing a claim. There is a time limit to file a claim for compensation. 7. How can I learn more?  Ask your health care provider.  Call your local or state health department.  Contact the Centers for Disease Control and Prevention (CDC): 
- Call 7-712.673.8947 (1-800-CDC-INFO) or 
- Visit CDCs influenza website at www.cdc.gov/flu Vaccine Information Statement (Interim) Inactivated Influenza Vaccine 8/15/2019 
[address] [id number]-[id number] Department of Health and [employer] Centers for Disease Control and Prevention Office Use Only

## 2021-01-08 ENCOUNTER — OFFICE VISIT (OUTPATIENT)
Dept: INTERNAL MEDICINE CLINIC | Age: 76
End: 2021-01-08
Payer: MEDICARE

## 2021-01-08 VITALS
HEART RATE: 74 BPM | SYSTOLIC BLOOD PRESSURE: 108 MMHG | OXYGEN SATURATION: 98 % | DIASTOLIC BLOOD PRESSURE: 76 MMHG | RESPIRATION RATE: 16 BRPM | BODY MASS INDEX: 27.66 KG/M2 | WEIGHT: 193.2 LBS | TEMPERATURE: 97.3 F | HEIGHT: 70 IN

## 2021-01-08 DIAGNOSIS — Z71.89 EDUCATED ABOUT COVID-19 VIRUS INFECTION: ICD-10-CM

## 2021-01-08 DIAGNOSIS — Z71.89 ADVANCED CARE PLANNING/COUNSELING DISCUSSION: ICD-10-CM

## 2021-01-08 DIAGNOSIS — R53.82 CHRONIC FATIGUE: ICD-10-CM

## 2021-01-08 DIAGNOSIS — Z00.00 MEDICARE ANNUAL WELLNESS VISIT, SUBSEQUENT: Primary | ICD-10-CM

## 2021-01-08 DIAGNOSIS — R91.8 ABNORMAL CT SCAN OF LUNG: ICD-10-CM

## 2021-01-08 DIAGNOSIS — H93.12 TINNITUS OF LEFT EAR: ICD-10-CM

## 2021-01-08 DIAGNOSIS — K21.9 GASTROESOPHAGEAL REFLUX DISEASE WITHOUT ESOPHAGITIS: ICD-10-CM

## 2021-01-08 LAB
ALBUMIN SERPL-MCNC: 3.8 G/DL (ref 3.5–5)
ALBUMIN/GLOB SERPL: 1.1 {RATIO} (ref 1.1–2.2)
ALP SERPL-CCNC: 76 U/L (ref 45–117)
ALT SERPL-CCNC: 22 U/L (ref 12–78)
ANION GAP SERPL CALC-SCNC: 4 MMOL/L (ref 5–15)
AST SERPL-CCNC: 15 U/L (ref 15–37)
BASOPHILS # BLD: 0.1 K/UL (ref 0–0.1)
BASOPHILS NFR BLD: 1 % (ref 0–1)
BILIRUB SERPL-MCNC: 0.6 MG/DL (ref 0.2–1)
BUN SERPL-MCNC: 28 MG/DL (ref 6–20)
BUN/CREAT SERPL: 29 (ref 12–20)
CALCIUM SERPL-MCNC: 8.9 MG/DL (ref 8.5–10.1)
CHLORIDE SERPL-SCNC: 107 MMOL/L (ref 97–108)
CO2 SERPL-SCNC: 28 MMOL/L (ref 21–32)
CREAT SERPL-MCNC: 0.98 MG/DL (ref 0.7–1.3)
DIFFERENTIAL METHOD BLD: ABNORMAL
EOSINOPHIL # BLD: 0.3 K/UL (ref 0–0.4)
EOSINOPHIL NFR BLD: 4 % (ref 0–7)
ERYTHROCYTE [DISTWIDTH] IN BLOOD BY AUTOMATED COUNT: 13.4 % (ref 11.5–14.5)
GLOBULIN SER CALC-MCNC: 3.5 G/DL (ref 2–4)
GLUCOSE SERPL-MCNC: 88 MG/DL (ref 65–100)
HCT VFR BLD AUTO: 44.7 % (ref 36.6–50.3)
HGB BLD-MCNC: 14.3 G/DL (ref 12.1–17)
IMM GRANULOCYTES # BLD AUTO: 0.1 K/UL (ref 0–0.04)
IMM GRANULOCYTES NFR BLD AUTO: 1 % (ref 0–0.5)
LYMPHOCYTES # BLD: 2.1 K/UL (ref 0.8–3.5)
LYMPHOCYTES NFR BLD: 27 % (ref 12–49)
MCH RBC QN AUTO: 29.4 PG (ref 26–34)
MCHC RBC AUTO-ENTMCNC: 32 G/DL (ref 30–36.5)
MCV RBC AUTO: 92 FL (ref 80–99)
MONOCYTES # BLD: 0.6 K/UL (ref 0–1)
MONOCYTES NFR BLD: 8 % (ref 5–13)
NEUTS SEG # BLD: 4.5 K/UL (ref 1.8–8)
NEUTS SEG NFR BLD: 59 % (ref 32–75)
NRBC # BLD: 0 K/UL (ref 0–0.01)
NRBC BLD-RTO: 0 PER 100 WBC
PLATELET # BLD AUTO: 243 K/UL (ref 150–400)
PMV BLD AUTO: 10.7 FL (ref 8.9–12.9)
POTASSIUM SERPL-SCNC: 4.7 MMOL/L (ref 3.5–5.1)
PROT SERPL-MCNC: 7.3 G/DL (ref 6.4–8.2)
RBC # BLD AUTO: 4.86 M/UL (ref 4.1–5.7)
SODIUM SERPL-SCNC: 139 MMOL/L (ref 136–145)
WBC # BLD AUTO: 7.6 K/UL (ref 4.1–11.1)

## 2021-01-08 PROCEDURE — 1101F PT FALLS ASSESS-DOCD LE1/YR: CPT | Performed by: INTERNAL MEDICINE

## 2021-01-08 PROCEDURE — G8510 SCR DEP NEG, NO PLAN REQD: HCPCS | Performed by: INTERNAL MEDICINE

## 2021-01-08 PROCEDURE — G8536 NO DOC ELDER MAL SCRN: HCPCS | Performed by: INTERNAL MEDICINE

## 2021-01-08 PROCEDURE — G8419 CALC BMI OUT NRM PARAM NOF/U: HCPCS | Performed by: INTERNAL MEDICINE

## 2021-01-08 PROCEDURE — G8427 DOCREV CUR MEDS BY ELIG CLIN: HCPCS | Performed by: INTERNAL MEDICINE

## 2021-01-08 PROCEDURE — 3017F COLORECTAL CA SCREEN DOC REV: CPT | Performed by: INTERNAL MEDICINE

## 2021-01-08 PROCEDURE — G0439 PPPS, SUBSEQ VISIT: HCPCS | Performed by: INTERNAL MEDICINE

## 2021-01-08 NOTE — ACP (ADVANCE CARE PLANNING)
Advance Care Planning     Advance Care Planning (ACP) Physician/NP/PA (Provider) Conversation    Date of ACP Conversation: 01/08/21  Persons included in Conversation:  patient  Length of ACP Conversation in minutes: <16 minutes (Non-Billable)    Authorized Decision Maker (if patient is incapable of making informed decisions):   Named in Advance Directive or Healthcare Power of       Primary Decision Maker (Active): Codarnelljerman Cuellar - [phone]    He has an advanced directive but it is out of date. He was provided a new form to complete. Reviewed DNR/DNI and patient is not interested.          Frannie Carrero MD

## 2021-01-08 NOTE — PROGRESS NOTES
Rena Fam is a 70 y.o. male who was seen in clinic today (1/8/2021) for a full physical.          Assessment & Plan:     Diagnoses and all orders for this visit:    1. Medicare annual wellness visit, subsequent  -     REFERRAL TO OPTOMETRY    2. Advanced care planning/counseling discussion    3. Educated about COVID-19 virus infection    4. Tinnitus of left ear- chronic issue, worsening, will have him see ENT due to h/o stapedectomy. 5. Chronic fatigue- this is a chronic problem, symptoms are: intermittent, differential dx reviewed with the patient, exact etiology is unclear at this time. Exercising regularly w/o any issues. Will check labs. Did review looking into sleep (home sleep study). -     METABOLIC PANEL, COMPREHENSIVE; Future  -     CBC WITH AUTOMATED DIFF; Future    6. Abnormal CT scan of lung- he reports did f/u with [facility], records will be requested. 7. Gastroesophageal reflux disease without esophagitis- unclear, curious if his cough is silent reflux. He will start OTC PPI x 30 days and revisit. Reviewed life line screening. Carotid normal on one side, minimal on the other. Reviewed when to get official imaging. Since he has no risk factors will defer for now. Follow-up and Dispositions    · Return in about 1 year (around 1/8/2022), or if symptoms worsen or fail to improve, for FULL PHYSICAL - 30 minutes. Subjective:   Max Marcial is here today for a full physical.      Annual Wellness Visit- Subsequent Visit    Since last visit: had a life line screening exam (10/16/20)       End of Life Planning: He has an advanced directive but it is out of date. He was provided a new form to complete. Reviewed DNR/DNI and patient is not interested.          Depression Screen:  3 most recent PHQ Screens 1/8/2021   Little interest or pleasure in doing things Not at all   Feeling down, depressed, irritable, or hopeless Not at all   Total Score PHQ 2 0   Trouble falling or staying asleep, or sleeping too much -   Feeling tired or having little energy -   Poor appetite, weight loss, or overeating -   Feeling bad about yourself - or that you are a failure or have let yourself or your family down -   Trouble concentrating on things such as school, work, reading, or watching TV -   Moving or speaking so slowly that other people could have noticed; or the opposite being so fidgety that others notice -   Thoughts of being better off dead, or hurting yourself in some way -   PHQ 9 Score -   How difficult have these problems made it for you to do your work, take care of your home and get along with others -         Fall Risk:   Fall Risk Assessment, last 12 mths 1/8/2021   Able to walk? Yes   Fall in past 12 months? 0   Do you feel unsteady? 0   Are you worried about falling 0       Abuse Screen:  Abuse Screening Questionnaire 1/8/2021   Do you ever feel afraid of your partner? N   Are you in a relationship with someone who physically or mentally threatens you? N   Is it safe for you to go home? Y         Do you average more than 1 drink per night or more than 7 drinks a week: No    In the past three months have you have had more than 4 drinks containing alcohol on one occasion: No    Health Maintenance:   Daily Aspirin: no  AAA Screening: n/a: never smoked  Glaucoma Screening: UTD    Immunizations:    Influenza: up to date   Tetanus: up to date   Shingles: up to date   Pneumonia: up to date  Cancer screening:   Prostate: guidelines reviewed, monitored by urologist  Colon: guidelines reviewed, up to date    Functional Ability and Level of Safety:    Hearing: Hearing is good. Cognition Screen:   Has your family/caregiver stated any concerns about your memory: no     Ambulation: with no difficulty     Activities of Daily Living: The home contains: grab bars  Patient does total self care  Exercise: active    Adult Nutrition Screen:  No risk factors noted.        Patient Care Team:  Eve Bermeo MD PETER as PCP - General (Internal Medicine)  Abbey Mcfadden MD as PCP - REHABILITATION [facility] Empaneled Provider  Adia Toussaint MD (Urology)  Sanaz Baker MD (Hematology and Oncology)  Leopoldo Godwin MD (Pulmonary Disease)  Rodrigo Brown MD (General Surgery)  Pablo Chavez, [address], OD [facility])       The following sections were reviewed & updated as appropriate: Allergies, PMH, PSH, FH, and SH. Patient Active Problem List   Diagnosis Code    Abnormal CT scan of lung R91.8    GERD (gastroesophageal reflux disease) K21.9    Tinnitus of left ear H93.12          Prior to Admission medications    Medication Sig Start Date End Date Taking? Authorizing Provider   alclometasone (ACLOVATE) 0.05 % topical cream Apply  to affected area two (2) times a day. Ears and as needed for skin irritation 9/30/20  Yes Provider, Historical   fluocinoNIDE (LIDEX) 0.05 % ointment Apply  to affected area two (2) times a day. 7/23/20  Yes Abbey Mcfadden MD   econazole nitrate (SPECTAZOLE) 1 % topical cream Apply to effected as needed twice a day. 7/21/20  Yes Abbey Mcfadden MD   finasteride (PROSCAR) 5 mg tablet Take 0.5 Tabs by mouth daily. 10/21/19  Yes Provider, Historical   sildenafil, antihypertensive, (REVATIO) 20 mg tablet Take 1-5 tablets by mouth prior to sexual activity as needed. 6/6/19  Yes Abbey Mcfadden MD   diclofenac (VOLTAREN) 1 % gel Apply  to affected area four (4) times daily as needed. Yes Provider, Historical   OTHER TAKES OCCASIONAL OTC ALLERGY MEDICATION     Provider, Historical          Allergies   Allergen Reactions    Lamisil [Terbinafine] Other (comments)     STOMACH ACHE    Other Medication Other (comments)     Does not want to take narcotics, hypersensitive              Review of Systems   Constitutional: Positive for malaise/fatigue (still exercising regularly, no issues, will need to take a nap on/off in the afternoon). Negative for weight loss.    HENT: Positive for tinnitus (left sided, is getting worse). Respiratory: Positive for cough (rare, dry, periodic, no obvious etiology to him). Negative for shortness of breath. Cardiovascular: Negative for chest pain, palpitations and leg swelling. Gastrointestinal: Negative for abdominal pain, constipation, diarrhea, heartburn, nausea and vomiting. Musculoskeletal: Negative for joint pain and myalgias. Skin: Negative for rash. Neurological: Negative for dizziness and headaches. Psychiatric/Behavioral: Positive for depression (intermittently will notice decreased mood, mostly around politics and worrying about his elderly family members). The patient is not nervous/anxious and does not have insomnia. All other systems reviewed and are negative. Objective:   Physical Exam  Constitutional:       General: He is not in acute distress. Appearance: Normal appearance. Eyes:      Conjunctiva/sclera: Conjunctivae normal.   Cardiovascular:      Rate and Rhythm: Regular rhythm. Heart sounds: No murmur. Pulmonary:      Effort: Pulmonary effort is normal.      Breath sounds: Normal breath sounds. No decreased breath sounds or wheezing. Abdominal:      General: Bowel sounds are normal.      Palpations: Abdomen is soft. Tenderness: There is no abdominal tenderness. Musculoskeletal:      Right lower leg: No edema. Left lower leg: No edema. Psychiatric:         Mood and Affect: Mood and affect normal.            Visit Vitals  /76   Pulse 74   Temp 97.3 °F (36.3 °C) (Temporal)   Resp 16   Ht 5' 9.6\" (1.768 m)   Wt 193 lb 3.2 oz (87.6 kg)   SpO2 98%   BMI 28.04 kg/m²          Advised him to call back or return to office if symptoms worsen/change/persist.  Discussed expected course/resolution/complications of diagnosis in detail with patient. Medication risks/benefits/costs/interactions/alternatives discussed with patient.   He was given an after visit summary which includes diagnoses, current medications, & vitals. He expressed understanding with the diagnosis and plan. Aspects of this note may have been generated using voice recognition software. Despite editing, there may be some syntax errors.        Lauren Gardner MD

## 2021-01-15 ENCOUNTER — TELEPHONE (OUTPATIENT)
Dept: INTERNAL MEDICINE CLINIC | Age: 76
End: 2021-01-15

## 2021-01-15 NOTE — TELEPHONE ENCOUNTER
[phone]    Please call to discuss his lab results also [place] [facility] [place]

## 2021-03-19 ENCOUNTER — OFFICE VISIT (OUTPATIENT)
Dept: INTERNAL MEDICINE CLINIC | Age: 76
End: 2021-03-19
Payer: MEDICARE

## 2021-03-19 VITALS
BODY MASS INDEX: 28.37 KG/M2 | OXYGEN SATURATION: 95 % | RESPIRATION RATE: 16 BRPM | DIASTOLIC BLOOD PRESSURE: 67 MMHG | SYSTOLIC BLOOD PRESSURE: 98 MMHG | HEART RATE: 91 BPM | WEIGHT: 198.2 LBS | HEIGHT: 70 IN | TEMPERATURE: 97.1 F

## 2021-03-19 DIAGNOSIS — K21.9 GASTROESOPHAGEAL REFLUX DISEASE WITHOUT ESOPHAGITIS: Primary | ICD-10-CM

## 2021-03-19 DIAGNOSIS — Z87.19 HISTORY OF CONSTIPATION: ICD-10-CM

## 2021-03-19 PROCEDURE — G0463 HOSPITAL OUTPT CLINIC VISIT: HCPCS | Performed by: NURSE PRACTITIONER

## 2021-03-19 PROCEDURE — G8536 NO DOC ELDER MAL SCRN: HCPCS | Performed by: NURSE PRACTITIONER

## 2021-03-19 PROCEDURE — 1101F PT FALLS ASSESS-DOCD LE1/YR: CPT | Performed by: NURSE PRACTITIONER

## 2021-03-19 PROCEDURE — 3017F COLORECTAL CA SCREEN DOC REV: CPT | Performed by: NURSE PRACTITIONER

## 2021-03-19 PROCEDURE — G8419 CALC BMI OUT NRM PARAM NOF/U: HCPCS | Performed by: NURSE PRACTITIONER

## 2021-03-19 PROCEDURE — G8427 DOCREV CUR MEDS BY ELIG CLIN: HCPCS | Performed by: NURSE PRACTITIONER

## 2021-03-19 PROCEDURE — 99213 OFFICE O/P EST LOW 20 MIN: CPT | Performed by: NURSE PRACTITIONER

## 2021-03-19 PROCEDURE — G8432 DEP SCR NOT DOC, RNG: HCPCS | Performed by: NURSE PRACTITIONER

## 2021-03-19 RX ORDER — FLUOCINONIDE 0.5 MG/G
OINTMENT TOPICAL 2 TIMES DAILY
Qty: 15 G | Refills: 0 | Status: SHIPPED | OUTPATIENT
Start: 2021-03-19 | End: 2022-01-14 | Stop reason: SDUPTHER

## 2021-03-19 RX ORDER — ECONAZOLE NITRATE 10 MG/G
CREAM TOPICAL
Qty: 15 G | Refills: 0 | Status: SHIPPED | OUTPATIENT
Start: 2021-03-19 | End: 2022-01-14 | Stop reason: SDUPTHER

## 2021-03-19 NOTE — PROGRESS NOTES
HISTORY OF PRESENT ILLNESS  Jessica Fry is a 78 y.o. male. Patient took ranitidine off and on since January for GERD flares. He had to take it last week. He reports eating at least 4-5 halo clementines per day. He also eats a lot of tomatoes in his salad daily. He has been eating later at night(around 8pm) recently and going to bed soon after. He has noticed more epigastric discomfort/heartburn over the past few months. Patient reports constipation started 3-4 days ago with bloating and mild abdominal pain. He started metamucil 3 x per day x 2-3 days and finally had a large normal bowel movement this morning. He is now feeling much better and pain has resolved. Visit Vitals  BP 98/67   Pulse 91   Temp 97.1 °F (36.2 °C) (Temporal)   Resp 16   Ht 5' 9.6\" (1.768 m)   Wt 198 lb 3.2 oz (89.9 kg)   SpO2 95%   BMI 28.77 kg/m²       HPI    Review of Systems   Gastrointestinal: Positive for abdominal pain and constipation. Physical Exam  Constitutional:       Appearance: Normal appearance. Abdominal:      General: Abdomen is flat. Bowel sounds are normal. There is no distension. Palpations: There is no mass. Tenderness: There is abdominal tenderness (very minimal across lower abdomen, only with deep palpation, not at rest). Skin:     General: Skin is warm and dry. Neurological:      General: No focal deficit present. Mental Status: He is alert and oriented to person, place, and time. Psychiatric:         Mood and Affect: Mood normal.         Behavior: Behavior normal.         ASSESSMENT and PLAN    ICD-10-CM ICD-9-CM    1. Gastroesophageal reflux disease without esophagitis  K21.9 530.81    2.  History of constipation  Z87.19 V12.79      Orders Placed This Encounter    fluocinoNIDE (LIDEX) 0.05 % ointment    econazole nitrate (SPECTAZOLE) 1 % topical cream   try to limit acidic foods, especially in the late afternoon and evening, but needs to restrict all day  Avoid eating late at night within 4 hours of bedtime  Pepcid OTC if gerd symptoms return or worsen

## 2021-06-02 ENCOUNTER — OFFICE VISIT (OUTPATIENT)
Dept: INTERNAL MEDICINE CLINIC | Age: 76
End: 2021-06-02
Payer: MEDICARE

## 2021-06-02 VITALS
SYSTOLIC BLOOD PRESSURE: 111 MMHG | HEIGHT: 70 IN | BODY MASS INDEX: 27.89 KG/M2 | WEIGHT: 194.8 LBS | RESPIRATION RATE: 16 BRPM | DIASTOLIC BLOOD PRESSURE: 70 MMHG | OXYGEN SATURATION: 96 % | TEMPERATURE: 97.5 F | HEART RATE: 70 BPM

## 2021-06-02 DIAGNOSIS — G47.9 SLEEP DISTURBANCE: ICD-10-CM

## 2021-06-02 DIAGNOSIS — M25.511 ACUTE PAIN OF RIGHT SHOULDER: ICD-10-CM

## 2021-06-02 DIAGNOSIS — G89.29 CHRONIC PAIN OF BOTH KNEES: ICD-10-CM

## 2021-06-02 DIAGNOSIS — M25.561 CHRONIC PAIN OF BOTH KNEES: ICD-10-CM

## 2021-06-02 DIAGNOSIS — R26.81 GAIT INSTABILITY: Primary | ICD-10-CM

## 2021-06-02 DIAGNOSIS — M25.562 CHRONIC PAIN OF BOTH KNEES: ICD-10-CM

## 2021-06-02 PROCEDURE — 1101F PT FALLS ASSESS-DOCD LE1/YR: CPT | Performed by: NURSE PRACTITIONER

## 2021-06-02 PROCEDURE — G8427 DOCREV CUR MEDS BY ELIG CLIN: HCPCS | Performed by: NURSE PRACTITIONER

## 2021-06-02 PROCEDURE — G8536 NO DOC ELDER MAL SCRN: HCPCS | Performed by: NURSE PRACTITIONER

## 2021-06-02 PROCEDURE — G8419 CALC BMI OUT NRM PARAM NOF/U: HCPCS | Performed by: NURSE PRACTITIONER

## 2021-06-02 PROCEDURE — 99213 OFFICE O/P EST LOW 20 MIN: CPT | Performed by: NURSE PRACTITIONER

## 2021-06-02 PROCEDURE — G0463 HOSPITAL OUTPT CLINIC VISIT: HCPCS | Performed by: NURSE PRACTITIONER

## 2021-06-02 PROCEDURE — 3017F COLORECTAL CA SCREEN DOC REV: CPT | Performed by: NURSE PRACTITIONER

## 2021-06-02 PROCEDURE — G8432 DEP SCR NOT DOC, RNG: HCPCS | Performed by: NURSE PRACTITIONER

## 2021-06-02 RX ORDER — METHYLPREDNISOLONE 4 MG/1
TABLET ORAL
Qty: 1 DOSE PACK | Refills: 0 | Status: SHIPPED | OUTPATIENT
Start: 2021-06-02 | End: 2021-09-10 | Stop reason: ALTCHOICE

## 2021-06-02 NOTE — PROGRESS NOTES
HISTORY OF PRESENT ILLNESS  Kane Blackburn is a 78 y.o. male. Patient reports right shoulder pain radiating down right arm x 4 days. He lifted a heavy object, weighing about 35 lbs, over his head on Saturday morning. Pain started the next day. He swam a mile yesterday with some pain. He cut grass with push mower yesterday x 2 hours with mild pain. Strength intact, but pain with resistance. Patient took tylenol with mild relief. No ice or heat. He also tried bengay with minimal relief. Patient also reports bilateral knee pain, feeling stiff in the mornings, and worse with his work shoes(which have a higher heel). Patient states pain is better with flatter shoes or barefoot. Pain occurs in medial posterior portion of both knees. He feels his gait is not as stable as it used to be and would like to do PT. Patient also reports not feeling rested over the past few months, even when he feels he has had enough sleep. He wakes up with his mouth and lips feeling very dry. He lives alone and is unsure if he snores. Visit Vitals  /70 (BP 1 Location: Right arm, BP Patient Position: Sitting, BP Cuff Size: Large adult)   Pulse 70   Temp 97.5 °F (36.4 °C) (Temporal)   Resp 16   Ht 5' 9.6\" (1.768 m)   Wt 194 lb 12.8 oz (88.4 kg)   SpO2 96%   BMI 28.27 kg/m²       HPI    Review of Systems   Musculoskeletal:        Knee pain, right shoulder pain       Physical Exam  Constitutional:       Appearance: Normal appearance. Musculoskeletal:      Right knee: Crepitus present. Normal range of motion. Left knee: Crepitus present. Normal range of motion.       Comments: Right anterior shoulder pain at region of labrum/shoulder joint capsule; pain radiates to anterior bicep and posterior tricep with deep palpation; pain also noted in forearm and wrist with wrist extension; strength intact and normal bilaterally; full range of motion of shoulder and wrist performed without difficulty; mild bilateral knee pain of medial posterior aspect with lunges; no pain with standing or flexion/extension in sitting position   Skin:     General: Skin is warm and dry. Neurological:      General: No focal deficit present. Mental Status: He is alert and oriented to person, place, and time. ASSESSMENT and PLAN    ICD-10-CM ICD-9-CM    1. Gait instability  R26.81 781.2 REFERRAL TO PHYSICAL THERAPY   2. Chronic pain of both knees  M25.561 719.46 REFERRAL TO PHYSICAL THERAPY    M25.562 338.29     G89.29     3. Acute pain of right shoulder  M25.511 719.41 REFERRAL TO PHYSICAL THERAPY   4. Sleep disturbance  G47.9 780.50 SLEEP MEDICINE REFERRAL     Orders Placed This Encounter    REFERRAL TO PHYSICAL THERAPY    [facility]    methylPREDNISolone (MEDROL DOSEPACK) 4 mg tablet   sleep study ordered  PT ordered for gait instability and knee/shoulder pain  Consider Ortho referral for shoulder if no improvement over the next 2 weeks  Avoid heavy lifting  Medrol ordered

## 2021-06-25 ENCOUNTER — TELEPHONE (OUTPATIENT)
Dept: INTERNAL MEDICINE CLINIC | Age: 76
End: 2021-06-25

## 2021-06-25 NOTE — TELEPHONE ENCOUNTER
Call to patient, identified with 2 identifiers. Advised of Dr. Soila Escobar' note and recommendations. He is okay with this. Wants to try some advil to see if helps. He still has order for PT from [facility]. If not improved over the weekend he will pursue.

## 2021-06-25 NOTE — TELEPHONE ENCOUNTER
Ice and rest.  Need to rest!  I'm not sure if there is anything else going on or if he did to much to soon. Would see PT.   If that does not help then will need to see ortho or get imaging

## 2021-06-25 NOTE — TELEPHONE ENCOUNTER
[phone]      His right arm is better after he took the steroids. He must  of not  waited long and did some work and now it is in a lot of pain. He is using heat and ice. What should he do?

## 2021-07-15 ENCOUNTER — TELEPHONE (OUTPATIENT)
Dept: INTERNAL MEDICINE CLINIC | Age: 76
End: 2021-07-15

## 2021-07-15 DIAGNOSIS — M25.511 ACUTE PAIN OF RIGHT SHOULDER: Primary | ICD-10-CM

## 2021-07-15 NOTE — TELEPHONE ENCOUNTER
Napoleon Stout ([facility]) [phone] (H)     Pt is requesting a call back regarding his rt arm pain and physical therapy. He says his arm feels as if it is getting better, but physical therapist thinks that it is related to his shoulder. He wonders if maybe he should have an mri done?

## 2021-07-15 NOTE — TELEPHONE ENCOUNTER
Seen by Alex Araya on 6/2. Note reviewed. MRI would be the next step but depends on how he is doing. If he is >80% improved I would defer the MRI. There likely is some damage but surgery would likely not be an option.   If he is < 80% improved then MRI would be beneficial to assess damage and then determine if he needs to see specialist.

## 2021-07-16 NOTE — TELEPHONE ENCOUNTER
Pt. States he injured his right arm end of May lifting something heavy and he is right handed. He cannot even  a bag of groceries or his garden hose. Pt. States when he swims the first 1/2 mi. He has right shoulder pain then eases off until end of swim recurs. He's only been going to PT x2wks but has worked with therapist off and on 25yrs. Harriet Shelton thinks he has re-injured his right shoulder and needs imaging. Pt. Had bicep tendon and rotator cuff  repair 7/15/2005 with Dr Pepe Vizcarra. Pt. States he's normally very active laying breaks, heavy gardening and can't do anything now except with his left hand. Pt. Wants to know if he needs to give PT more time or if he should have an MRI now. Pt. Gave verbal consent to leave a detailed message [phone].

## 2021-07-16 NOTE — TELEPHONE ENCOUNTER
Pt. Notified to get MRI done first available and he will schedule himself. Pt. States he has 2 titanium screws, one in a leg and one in an elbow and he has an ear prothesis unknown what it's made of. Pt. Will call Dr Marisol Gorman [phone] for records and have faxed to Dr Dontae Pollock. Pt. Asked if I could research previous records from Dr Tae Mike, informed I cannot and it's safer to contact the surgeons office directly.

## 2021-07-19 ENCOUNTER — TELEPHONE (OUTPATIENT)
Dept: INTERNAL MEDICINE CLINIC | Age: 76
End: 2021-07-19

## 2021-07-19 NOTE — TELEPHONE ENCOUNTER
Would recommend continuing w/ PT but have him see ortho surgeon (Dr Tam Mejia @ [facility]). They are other options, like a CT scan, but needs to see a surgeon to determine if it is an option or if needs to wait for MRI.

## 2021-07-19 NOTE — TELEPHONE ENCOUNTER
Call to patient, identified with 2 identifiers. Advised of Dr. Yuval Santos' note and recommendations. He says he is actually feeling a little better from PT. He has seen Dr. Seema Jha in the past and will either schedule with him or Dr. Seema Jha.

## 2021-07-19 NOTE — TELEPHONE ENCOUNTER
PT contacted ear surgeon (who is on vacation for two weeks), and PT's records for his ear surgery from 2004 have been destroyed at both the hospital and the doctor's office. They are trying to get the records from the medical archives, so it is not safe to have an MRI yet. PT says he will continue to have his physical therapy and will get an MRI if they are able to find records.     Tal Goncalves (Self) [phone] Jose Brown)       PT says if you would like to leave a detailed voicemail, you have permission to leave it on voicemail for home phone at: [phone]

## 2021-07-21 ENCOUNTER — TELEPHONE (OUTPATIENT)
Dept: INTERNAL MEDICINE CLINIC | Age: 76
End: 2021-07-21

## 2021-07-21 NOTE — TELEPHONE ENCOUNTER
Patient wants Dr. Rohini Jimenez to know he made an appt with Eliseo Scott who is Dr. Shani MARTINEZ for Aug 5th since the first available with Dr. Ronni Gray wasn't until Aug 26th. Wants to know if that is ok with Dr. Rohini Jimenez. He does not need a referral.    Patient said you can leave a detailed message on the phone number provided.     [phone]

## 2021-07-21 NOTE — TELEPHONE ENCOUNTER
Attempted to reach patient via phone, unsucessful. Left detailed message regarding Dr. Kathy Rodrigues' note.

## 2021-07-21 NOTE — TELEPHONE ENCOUNTER
Lesly Morales called about seeing an orthopedic surgeon     He would like to see Constanza Pichardo from [facility]    Would like to know if he needs a referral    Pt will also be calling [facility] to see if he can make an appt    [phone]

## 2021-08-18 ENCOUNTER — TELEPHONE (OUTPATIENT)
Dept: INTERNAL MEDICINE CLINIC | Age: 76
End: 2021-08-18

## 2021-08-18 NOTE — TELEPHONE ENCOUNTER
Notified patient per provider note. Patient had stapedectomy  20 years ago. Need to confirm if stapes compatible with getting MRI. Has requested records from surgeons office. [facility]. Will forward to MD for review.

## 2021-09-10 ENCOUNTER — OFFICE VISIT (OUTPATIENT)
Dept: INTERNAL MEDICINE CLINIC | Age: 76
End: 2021-09-10
Payer: MEDICARE

## 2021-09-10 VITALS
WEIGHT: 191.6 LBS | BODY MASS INDEX: 27.43 KG/M2 | RESPIRATION RATE: 16 BRPM | DIASTOLIC BLOOD PRESSURE: 73 MMHG | TEMPERATURE: 97.5 F | SYSTOLIC BLOOD PRESSURE: 124 MMHG | HEIGHT: 70 IN | OXYGEN SATURATION: 95 % | HEART RATE: 65 BPM

## 2021-09-10 DIAGNOSIS — S61.411A LACERATION OF RIGHT HAND WITHOUT FOREIGN BODY, INITIAL ENCOUNTER: Primary | ICD-10-CM

## 2021-09-10 PROCEDURE — G0463 HOSPITAL OUTPT CLINIC VISIT: HCPCS | Performed by: INTERNAL MEDICINE

## 2021-09-10 PROCEDURE — 99213 OFFICE O/P EST LOW 20 MIN: CPT | Performed by: INTERNAL MEDICINE

## 2021-09-10 PROCEDURE — 1101F PT FALLS ASSESS-DOCD LE1/YR: CPT | Performed by: INTERNAL MEDICINE

## 2021-09-10 PROCEDURE — G8419 CALC BMI OUT NRM PARAM NOF/U: HCPCS | Performed by: INTERNAL MEDICINE

## 2021-09-10 PROCEDURE — G8510 SCR DEP NEG, NO PLAN REQD: HCPCS | Performed by: INTERNAL MEDICINE

## 2021-09-10 PROCEDURE — 3017F COLORECTAL CA SCREEN DOC REV: CPT | Performed by: INTERNAL MEDICINE

## 2021-09-10 PROCEDURE — G8536 NO DOC ELDER MAL SCRN: HCPCS | Performed by: INTERNAL MEDICINE

## 2021-09-10 PROCEDURE — G8427 DOCREV CUR MEDS BY ELIG CLIN: HCPCS | Performed by: INTERNAL MEDICINE

## 2021-09-10 NOTE — PROGRESS NOTES
HISTORY OF PRESENT ILLNESS  Celestino Huston is a 78 y.o. male. 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 The wounds bled and he applied band aids across the laceration to adhere the edges. It is healing well. He denies worsening pain, redness, drainage or fevers. He called and was told that his last tetanus shot was in 2010. On review of patient's chart he had Tdap 9/23/19. Patient is wondering about getting Lasik surgery and will set up a consultation with his Ophthalmologist.    ROS  Per [facility]  Physical Exam  Visit Vitals  /73 (BP 1 Location: Left upper arm, BP Patient Position: Sitting, BP Cuff Size: Large adult)   Pulse 65   Temp 97.5 °F (36.4 °C) (Temporal)   Resp 16   Ht 5' 9.5\" (1.765 m)   Wt 191 lb 9.6 oz (86.9 kg)   SpO2 95%   BMI 27.89 kg/m²     Patient appears well  Heart[de-identified] normal rate, regular rhythm, normal S1, S2, no murmurs, rubs, clicks or gallops. Chest: clear to auscultation, no wheezes, rales or rhonchi,   Extremities: no edema  Right hand dorsal area with an approximately 4 cm superficial healing laceration, without drainage or erythema  Left hand with 2 healing puncture wounds over distal 3rd and 5th digits  No warmth, drainage or erythema  ASSESSMENT and PLAN  Superficial injuries with maty nail   Patient is up to date on his tetanus immunization  There is no evidence of secondary infection at this time  He is advised to monitor for infection  Call or return to clinic if these symptoms worsen or fail to improve as anticipated.    He will follow up with Ophthomology for [facility] consult

## 2021-09-10 NOTE — PROGRESS NOTES
Chief Complaint   Patient presents with    Finger Pain     pinky finger injury on right pinky        1. Have you been to the ER, urgent care clinic since your last visit? Hospitalized since your last visit? No    2. Have you seen or consulted any other health care providers outside of the [address] [place] [address] since your last visit? Include any pap smears or colon screening.  No

## 2021-10-06 ENCOUNTER — TELEPHONE (OUTPATIENT)
Dept: INTERNAL MEDICINE CLINIC | Age: 76
End: 2021-10-06

## 2021-10-06 NOTE — TELEPHONE ENCOUNTER
Reason for call:  Pt requesting call back with question he has about if he should get the booster vaccine, a flu shot and what to do about his rt shoulder?     Is this a new problem: yes     Date of last appointment:  9/10/2021     Can we respond via [employer]: no    Best call back number:  Anais Kraus (Self) [phone] (M)     Can leave mess if needs to on [phone]

## 2021-10-07 NOTE — TELEPHONE ENCOUNTER
His letter was reviewed. Regarding shoulder-  I will defer to surgeon. Normally they want imaging done prior to surgery (MRI ideal, maybe a CT). If he can't do the MRI then he needs to talk to surgeon about CT or other imaging. If they can't do that it is up to him and the surgeon to decide if they go forward with an operation (exploratory and then repair). It sounds like this is impacting his qualify of life, so if it is then then I think he should look for an options to fix it. Yes, get the booster shot.

## 2021-10-08 NOTE — TELEPHONE ENCOUNTER
Call to patient, identified with 2 identifiers. Advised of Dr. Karlie Wallace' note and recommendations. Also, told him Dr. Karlie Wallace would likely to defer to specialists with all his concerns but he still wanted an opportunity to discuss with Dr. Karlie Wallace. Scheduled for 11/17/21 with Dr. Karlie Wallace.

## 2021-10-08 NOTE — TELEPHONE ENCOUNTER
RC to patient, identified with 2 identifiers. He had multiple questions. Would rather have MRI than CT (due to lifetime radiation concerns) but wasn't happy with answer from Dr who did his surgery (\"possibility of contaminant metals\"). Wanting Dr. Brie Sands to call that doctor to see if could get a different answer. Told him may not be able to call, may likely get same answer, but would forward request to Dr. Brie Sands. Also, asking about different surgeries. Told him those questions better addressed to ortho. Still asking about \"quality of life\" issues around different options. Wants Dr. Brie Sands' input. Wants to be as active as he can as he ages. Have read him all responses from Dr. Brie Sands. Told him many questions/concerns, may require office visit which he is happy to have. Also, forwarding last [employer] message which hasn't yet been reviewed to Dr. Brie Sands.

## 2021-10-08 NOTE — TELEPHONE ENCOUNTER
He needs to schedule an appointment. There is to much to talk about over MyChart or over the telephone.

## 2021-10-19 ENCOUNTER — CLINICAL SUPPORT (OUTPATIENT)
Dept: INTERNAL MEDICINE CLINIC | Age: 76
End: 2021-10-19
Payer: MEDICARE

## 2021-10-19 VITALS — BODY MASS INDEX: 27.89 KG/M2 | HEIGHT: 70 IN

## 2021-10-19 DIAGNOSIS — Z23 ENCOUNTER FOR IMMUNIZATION: Primary | ICD-10-CM

## 2021-10-19 PROCEDURE — 90694 VACC AIIV4 NO PRSRV 0.5ML IM: CPT | Performed by: INTERNAL MEDICINE

## 2021-10-19 NOTE — PROGRESS NOTES
Cristina Chapman  is a 78 y.o.  male  who present for routine immunizations. Prior to vaccine administration: Consent was obtained. Risks and adverse reactions were discussed. The patient was provided the VIS and they were given an opportunity to ask questions; all questions were addressed. He  denies any symptoms, reactions or allergies that would exclude them from being immunized today. There were no adverse reactions observed post vaccination. Patient was advised to seek medical or call the office with any questions or concerns post vaccination. Patient verbalized understanding.   Nancie Guthrie

## 2021-10-20 ENCOUNTER — TELEPHONE (OUTPATIENT)
Dept: INTERNAL MEDICINE CLINIC | Age: 76
End: 2021-10-20

## 2021-10-20 DIAGNOSIS — M25.512 ACUTE PAIN OF LEFT SHOULDER: Primary | ICD-10-CM

## 2021-10-20 NOTE — TELEPHONE ENCOUNTER
Reason for call:   w [facility] called on behalf of the pt    Pt would like to get pt for his left shoulder; last script was for right shoulder and has been discharged    Would like to get a script for the left shoulder    Pls fax    ATTN: April  Fax: [phone]    Is this a new problem: yes     Date of last appointment:  10/19/2021     Can we respond via [employer]: no    Best call back number: [phone]

## 2021-10-21 NOTE — TELEPHONE ENCOUNTER
Per provider approval, faxed order for PT left shoulder pain to [facility]) [phone]. Confirmation received.

## 2021-10-22 NOTE — TELEPHONE ENCOUNTER
Spoke with April with [facility]. She confirmed patient scheduled on Monday for evaluation.

## 2021-11-17 ENCOUNTER — OFFICE VISIT (OUTPATIENT)
Dept: INTERNAL MEDICINE CLINIC | Age: 76
End: 2021-11-17
Payer: MEDICARE

## 2021-11-17 VITALS
OXYGEN SATURATION: 97 % | HEART RATE: 85 BPM | WEIGHT: 197 LBS | BODY MASS INDEX: 28.2 KG/M2 | TEMPERATURE: 97.8 F | HEIGHT: 70 IN | DIASTOLIC BLOOD PRESSURE: 65 MMHG | SYSTOLIC BLOOD PRESSURE: 94 MMHG | RESPIRATION RATE: 16 BRPM

## 2021-11-17 DIAGNOSIS — G89.29 CHRONIC RIGHT SHOULDER PAIN: Primary | ICD-10-CM

## 2021-11-17 DIAGNOSIS — M25.511 CHRONIC RIGHT SHOULDER PAIN: Primary | ICD-10-CM

## 2021-11-17 PROCEDURE — G8432 DEP SCR NOT DOC, RNG: HCPCS | Performed by: INTERNAL MEDICINE

## 2021-11-17 PROCEDURE — G8419 CALC BMI OUT NRM PARAM NOF/U: HCPCS | Performed by: INTERNAL MEDICINE

## 2021-11-17 PROCEDURE — G8536 NO DOC ELDER MAL SCRN: HCPCS | Performed by: INTERNAL MEDICINE

## 2021-11-17 PROCEDURE — 3017F COLORECTAL CA SCREEN DOC REV: CPT | Performed by: INTERNAL MEDICINE

## 2021-11-17 PROCEDURE — G0463 HOSPITAL OUTPT CLINIC VISIT: HCPCS | Performed by: INTERNAL MEDICINE

## 2021-11-17 PROCEDURE — 1101F PT FALLS ASSESS-DOCD LE1/YR: CPT | Performed by: INTERNAL MEDICINE

## 2021-11-17 PROCEDURE — 99213 OFFICE O/P EST LOW 20 MIN: CPT | Performed by: INTERNAL MEDICINE

## 2021-11-17 PROCEDURE — G8427 DOCREV CUR MEDS BY ELIG CLIN: HCPCS | Performed by: INTERNAL MEDICINE

## 2021-11-17 RX ORDER — CICLOPIROX 80 MG/ML
SOLUTION TOPICAL
COMMUNITY
Start: 2021-10-13 | End: 2022-03-30

## 2021-11-17 NOTE — PROGRESS NOTES
Brooke Khan is a 78 y.o. male who was seen in clinic today (11/17/2021) for an acute visit. Assessment & Plan:   Below is the assessment and plan developed based on review of pertinent history, physical exam, labs, studies, and medications. 1. Chronic right shoulder pain  Assessment & Plan:  Overall stable, reviewed I agree w/ PT - continue w/ HEP, limit usage and rest.  Reviewed life style changes needed to continue w/ quality of life. Did agree w/ getting into see ortho surgeon to see if any imaging (US vs CT) is warranted, discuss when to consider surgery, and recovery/expectations from procedure. On this date 11/17/2021 I have spent 23 minutes reviewing previous notes, test results and face to face with the patient discussing the diagnosis and importance of compliance with the treatment plan as well as documenting on the day of the visit. Follow-up and Dispositions    · Return if symptoms worsen or fail to improve. Subjective/Objective:   Markell Seay was seen today for Shoulder Pain    He was last seen in clinic by me in January. We have been discussing his shoulder extensively via [employer]. He did not f/u with surgeon yet. He is still seeing PT. From a clinical standpoint he was told he has 2 small tears in hsi RTC. He has not been swimming x 6 wks. He is trying to follow directions (lifting/raising his arm over his head). 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  He reports it is impacting his qualify of life. He has an appointment w/ Dr Edie Bean. He has not been able to get into see Dr Dru Christianson. Prior to Admission medications    Medication Sig Start Date End Date Taking? Authorizing Provider   fluocinoNIDE (LIDEX) 0.05 % ointment Apply  to affected area two (2) times a day. 3/19/21  Yes Berenice FLAHERTY NP   econazole nitrate (SPECTAZOLE) 1 % topical cream Apply to effected as needed twice a day.  3/19/21  Yes Anne Oseguera NP finasteride (PROSCAR) 5 mg tablet Take 0.5 Tabs by mouth daily. 10/21/19  Yes Provider, Historical   ciclopirox (PENLAC) 8 % solution APPLY OVER THE PREVIOUS APPLICATION TO NAIL AND SURROUNDING AREA DAILY FOR 7 DAYS. ON 7TH DAY REMOVE LACQUER WITH ALCOHOL AND REPEAT CYCLE 10/13/21   Provider, Historical   sildenafil, antihypertensive, (REVATIO) 20 mg tablet Take 1-5 tablets by mouth prior to sexual activity as needed. Patient not taking: Reported on 11/17/2021 6/6/19   John Barragan MD   OTHER TAKES OCCASIONAL OTC ALLERGY MEDICATION   Patient not taking: Reported on 11/17/2021 11/17/21  Provider, Historical        Visit Vitals  BP 94/65   Pulse 85   Temp 97.8 °F (36.6 °C) (Temporal)   Resp 16   Ht 5' 9.5\" (1.765 m)   Wt 197 lb (89.4 kg)   SpO2 97%   BMI 28.67 kg/m²         Advised him to call back or return to office if symptoms worsen/change/persist.  Discussed expected course/resolution/complications of diagnosis in detail with patient. Medication risks/benefits/costs/interactions/alternatives discussed with patient.     Izabela French MD

## 2021-12-07 ENCOUNTER — OFFICE VISIT (OUTPATIENT)
Dept: ORTHOPEDIC SURGERY | Age: 76
End: 2021-12-07
Payer: MEDICARE

## 2021-12-07 DIAGNOSIS — M12.811 ROTATOR CUFF TEAR ARTHROPATHY, RIGHT: Primary | ICD-10-CM

## 2021-12-07 DIAGNOSIS — M12.812 ROTATOR CUFF ARTHROPATHY, LEFT: ICD-10-CM

## 2021-12-07 DIAGNOSIS — M75.101 ROTATOR CUFF TEAR ARTHROPATHY, RIGHT: Primary | ICD-10-CM

## 2021-12-07 PROCEDURE — G8432 DEP SCR NOT DOC, RNG: HCPCS | Performed by: ORTHOPAEDIC SURGERY

## 2021-12-07 PROCEDURE — 3017F COLORECTAL CA SCREEN DOC REV: CPT | Performed by: ORTHOPAEDIC SURGERY

## 2021-12-07 PROCEDURE — G8536 NO DOC ELDER MAL SCRN: HCPCS | Performed by: ORTHOPAEDIC SURGERY

## 2021-12-07 PROCEDURE — 99203 OFFICE O/P NEW LOW 30 MIN: CPT | Performed by: ORTHOPAEDIC SURGERY

## 2021-12-07 PROCEDURE — 1101F PT FALLS ASSESS-DOCD LE1/YR: CPT | Performed by: ORTHOPAEDIC SURGERY

## 2021-12-07 PROCEDURE — G8419 CALC BMI OUT NRM PARAM NOF/U: HCPCS | Performed by: ORTHOPAEDIC SURGERY

## 2021-12-07 PROCEDURE — G8427 DOCREV CUR MEDS BY ELIG CLIN: HCPCS | Performed by: ORTHOPAEDIC SURGERY

## 2021-12-07 NOTE — PROGRESS NOTES
Jovanny Montero (: 1945) is a 78 y.o. male, patient, here for evaluation of the following chief complaint(s):  Shoulder Pain (right shoulder pain)       HPI:    Patient presents the office today with a chief complaint of bilateral shoulder pain. He is mostly here today for his right shoulder. This is a patient who has a well-known history of bilateral shoulder pain. He is a very active 24-year-old gentleman. He enjoys building particularly performing yina tasks. He states he works about 4 hours a day and still enjoys his work. However, with higher level of activity he has recurrent bilateral shoulder pain right worse than left. He describes discomfort across the anterolateral aspect of the shoulder. He has pain that radiates up into the superior portion of the shoulder as well. He had a prior surgical history performed in the right shoulder it sounds as if he had a acute rupture of the long head of the bicep and had an arthroscopy performed with or without rotator cuff repair. He has knowledge that he has had a history of rotator cuff issue. He does not have a lot of pain at night. He states he has good function with range of motion but still does feel a little weak compared to his younger days. Allergies   Allergen Reactions    Lamisil [Terbinafine] Other (comments)     STOMACH ACHE    Other Medication Other (comments)     Does not want to take narcotics, hypersensitive       Current Outpatient Medications   Medication Sig    ciclopirox (PENLAC) 8 % solution APPLY OVER THE PREVIOUS APPLICATION TO NAIL AND SURROUNDING AREA DAILY FOR 7 DAYS. ON 7TH DAY REMOVE LACQUER WITH ALCOHOL AND REPEAT CYCLE    fluocinoNIDE (LIDEX) 0.05 % ointment Apply  to affected area two (2) times a day.  econazole nitrate (SPECTAZOLE) 1 % topical cream Apply to effected as needed twice a day.  finasteride (PROSCAR) 5 mg tablet Take 0.5 Tabs by mouth daily.     sildenafil, antihypertensive, (REVATIO) 20 mg tablet Take 1-5 tablets by mouth prior to sexual activity as needed. (Patient not taking: Reported on 11/17/2021)     No current facility-administered medications for this visit.        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    (testicular cancer)    Tinnitus of left ear     attributed to anesthesia after orchiectomy surgery    Unspecified adverse effect of anesthesia     TINNITUS-GOT TWICE THE USUAL DOSE FOR ORCHIECTOMY        Past Surgical History:   Procedure Laterality Date    COLONOSCOPY N/A 2/26/2020    no polyps - performed by Yenifer [facility]z [facility] at [facility]COPY    HX COLONOSCOPY  8/27/03    hyperplastic polyp    HX COLONOSCOPY  3/11/09    normal    HX HAIR TRANSPLANT  11/2019    HX HEENT  11/2004    Left stapedectomy    HX HERNIA REPAIR Bilateral 2017    inguinal hernia repair    HX LAP CHOLECYSTECTOMY  2011    HX ORCHIECTOMY Right 1984    h/o seminoma    HX ORTHOPAEDIC Left 2000    bicept tendon repair    HX ORTHOPAEDIC Right 2005    bicept tendon repair    HX OTHER SURGICAL Bilateral 04/2019    Tenotomy (surgery on 5th toe, tendon severed due to curvature)       Family History   Problem Relation Age of Onset    Arthritis-osteo Mother     Other Mother         CONFUSION AFTER HIP SURG AT AGE 80    Heart Attack Father     Cancer Sister         breast    Other Brother         hearing loss    Other Sister         hearing loss    Atrial Fibrillation Sister         Social History     Socioeconomic History    Marital status: SINGLE     Spouse name: Not on file    Number of children: Not on file    Years of education: Not on file    Highest education level: Not on file   Occupational History    Not on file   Tobacco Use    Smoking status: Never Smoker    Smokeless tobacco: Never Used   Vaping Use    Vaping Use: Never used   Substance and Sexual Activity    Alcohol use: Yes     Comment: rarely    Drug use: No    Sexual activity: Yes     Partners: Male     Birth control/protection: Condom   Other Topics Concern    Not on file   Social History Narrative    Not on file     Social Determinants of Health     Financial Resource Strain:     Difficulty of Paying Living Expenses: Not on file   Food Insecurity:     Worried About Running Out of Food in the Last Year: Not on file    Taty of Food in the Last Year: Not on file   Transportation Needs:     Lack of Transportation (Medical): Not on file    Lack of Transportation (Non-Medical): Not on file   Physical Activity:     Days of Exercise per Week: Not on file    Minutes of Exercise per Session: Not on file   Stress:     Feeling of Stress : Not on file   Social Connections:     Frequency of Communication with Friends and Family: Not on file    Frequency of Social Gatherings with Friends and Family: Not on file    Attends Denominational Services: Not on file    Active Member of Clubs or Organizations: Not on file    Attends Club or Organization Meetings: Not on file    Marital Status: Not on file   Intimate Partner Violence:     Fear of Current or Ex-Partner: Not on file    Emotionally Abused: Not on file    Physically Abused: Not on file    Sexually Abused: Not on file   Housing Stability:     Unable to Pay for Housing in the Last Year: Not on file    Number of [place] in the Last Year: Not on file    Unstable Housing in the Last Year: Not on file       Review of Systems   Musculoskeletal:        Right shoulder pain       Vitals: There were no vitals taken for this visit. There is no height or weight on file to calculate BMI. Ortho Exam     Right shoulder: Prior portal sites of healed well. Patient has full range of motion of the shoulder. He has minimal crepitation.   He has decent strength at 4/5 with forward elevation, lateral abduction and external rotation. He has no bicep deformity. He has no swelling noted distally. Neurovascular examination is intact. Left shoulder: Patient has a similar exam with near full range of motion minimal pain to the superior arc of motion. Negative impingement sign rotator cuff strength 4/5 throughout. He has no swelling distally. Neurovascular examination is intact. Patient has brought x-rays and of both his right and left shoulder. He has superior migration of the humeral head bilaterally of approximately 5 mm. He has retained hardware from prior bicep tenodesis on the right humerus. He has minimal changes  to the glenohumeral joint bilaterally    ASSESSMENT/PLAN:    Patient presents to the office today with chronic rotator cuff tendon tear bilaterally with mild rotator cuff arthropathy. Clinically, the patient is doing quite well for his pathology. He has full range of motion. He has decent strength. He has had to modify his activity with his work. Fortunately he is still productive and functions very well. He has a lot of questions in regards to surgery. In this scenario, I would not recommend surgical intervention. While he does have full-thickness rotator cuff tendon tears and he has the beginning signs of rotator cuff arthropathy, his functionality and his pain is well controlled and I do not think surgical intervention would be clinically or ethically correct. Patient is comfortable with this. I did explain to the patient, he may have further decompensation of his shoulders as he grows older. If he does require surgery I would recommend an reverse total shoulder replacement but not now.   Patient is to return to the office as needed        Scotty Nguyễn MD

## 2022-01-14 ENCOUNTER — OFFICE VISIT (OUTPATIENT)
Dept: INTERNAL MEDICINE CLINIC | Age: 77
End: 2022-01-14
Payer: MEDICARE

## 2022-01-14 VITALS
OXYGEN SATURATION: 96 % | TEMPERATURE: 97.8 F | WEIGHT: 192 LBS | RESPIRATION RATE: 18 BRPM | HEIGHT: 70 IN | DIASTOLIC BLOOD PRESSURE: 66 MMHG | BODY MASS INDEX: 27.49 KG/M2 | SYSTOLIC BLOOD PRESSURE: 118 MMHG | HEART RATE: 82 BPM

## 2022-01-14 DIAGNOSIS — R91.8 ABNORMAL CT SCAN OF LUNG: ICD-10-CM

## 2022-01-14 DIAGNOSIS — G89.29 CHRONIC RIGHT SHOULDER PAIN: ICD-10-CM

## 2022-01-14 DIAGNOSIS — Z71.89 ADVANCED CARE PLANNING/COUNSELING DISCUSSION: ICD-10-CM

## 2022-01-14 DIAGNOSIS — H93.12 TINNITUS OF LEFT EAR: ICD-10-CM

## 2022-01-14 DIAGNOSIS — Z86.19 HISTORY OF TINEA CRURIS: ICD-10-CM

## 2022-01-14 DIAGNOSIS — L64.9 MALE PATTERN BALDNESS: ICD-10-CM

## 2022-01-14 DIAGNOSIS — Z00.00 MEDICARE ANNUAL WELLNESS VISIT, SUBSEQUENT: Primary | ICD-10-CM

## 2022-01-14 DIAGNOSIS — M25.511 CHRONIC RIGHT SHOULDER PAIN: ICD-10-CM

## 2022-01-14 LAB
ALBUMIN SERPL-MCNC: 3.6 G/DL (ref 3.5–5)
ALBUMIN/GLOB SERPL: 1.1 {RATIO} (ref 1.1–2.2)
ALP SERPL-CCNC: 76 U/L (ref 45–117)
ALT SERPL-CCNC: 20 U/L (ref 12–78)
ANION GAP SERPL CALC-SCNC: 5 MMOL/L (ref 5–15)
AST SERPL-CCNC: 13 U/L (ref 15–37)
BASOPHILS # BLD: 0.1 K/UL (ref 0–0.1)
BASOPHILS NFR BLD: 1 % (ref 0–1)
BILIRUB SERPL-MCNC: 0.9 MG/DL (ref 0.2–1)
BUN SERPL-MCNC: 21 MG/DL (ref 6–20)
BUN/CREAT SERPL: 24 (ref 12–20)
CALCIUM SERPL-MCNC: 9.4 MG/DL (ref 8.5–10.1)
CHLORIDE SERPL-SCNC: 105 MMOL/L (ref 97–108)
CO2 SERPL-SCNC: 28 MMOL/L (ref 21–32)
CREAT SERPL-MCNC: 0.89 MG/DL (ref 0.7–1.3)
DIFFERENTIAL METHOD BLD: ABNORMAL
EOSINOPHIL # BLD: 0.4 K/UL (ref 0–0.4)
EOSINOPHIL NFR BLD: 6 % (ref 0–7)
ERYTHROCYTE [DISTWIDTH] IN BLOOD BY AUTOMATED COUNT: 13 % (ref 11.5–14.5)
GLOBULIN SER CALC-MCNC: 3.4 G/DL (ref 2–4)
GLUCOSE SERPL-MCNC: 86 MG/DL (ref 65–100)
HCT VFR BLD AUTO: 43.8 % (ref 36.6–50.3)
HGB BLD-MCNC: 14.4 G/DL (ref 12.1–17)
IMM GRANULOCYTES # BLD AUTO: 0 K/UL (ref 0–0.04)
IMM GRANULOCYTES NFR BLD AUTO: 1 % (ref 0–0.5)
LYMPHOCYTES # BLD: 2.1 K/UL (ref 0.8–3.5)
LYMPHOCYTES NFR BLD: 33 % (ref 12–49)
MCH RBC QN AUTO: 29.4 PG (ref 26–34)
MCHC RBC AUTO-ENTMCNC: 32.9 G/DL (ref 30–36.5)
MCV RBC AUTO: 89.4 FL (ref 80–99)
MONOCYTES # BLD: 0.6 K/UL (ref 0–1)
MONOCYTES NFR BLD: 9 % (ref 5–13)
NEUTS SEG # BLD: 3.2 K/UL (ref 1.8–8)
NEUTS SEG NFR BLD: 50 % (ref 32–75)
NRBC # BLD: 0 K/UL (ref 0–0.01)
NRBC BLD-RTO: 0 PER 100 WBC
PLATELET # BLD AUTO: 222 K/UL (ref 150–400)
PMV BLD AUTO: 10.4 FL (ref 8.9–12.9)
POTASSIUM SERPL-SCNC: 4.5 MMOL/L (ref 3.5–5.1)
PROT SERPL-MCNC: 7 G/DL (ref 6.4–8.2)
RBC # BLD AUTO: 4.9 M/UL (ref 4.1–5.7)
SODIUM SERPL-SCNC: 138 MMOL/L (ref 136–145)
WBC # BLD AUTO: 6.4 K/UL (ref 4.1–11.1)

## 2022-01-14 PROCEDURE — 1101F PT FALLS ASSESS-DOCD LE1/YR: CPT | Performed by: INTERNAL MEDICINE

## 2022-01-14 PROCEDURE — G8536 NO DOC ELDER MAL SCRN: HCPCS | Performed by: INTERNAL MEDICINE

## 2022-01-14 PROCEDURE — G8510 SCR DEP NEG, NO PLAN REQD: HCPCS | Performed by: INTERNAL MEDICINE

## 2022-01-14 PROCEDURE — 3017F COLORECTAL CA SCREEN DOC REV: CPT | Performed by: INTERNAL MEDICINE

## 2022-01-14 PROCEDURE — G8419 CALC BMI OUT NRM PARAM NOF/U: HCPCS | Performed by: INTERNAL MEDICINE

## 2022-01-14 PROCEDURE — G8427 DOCREV CUR MEDS BY ELIG CLIN: HCPCS | Performed by: INTERNAL MEDICINE

## 2022-01-14 PROCEDURE — G0439 PPPS, SUBSEQ VISIT: HCPCS | Performed by: INTERNAL MEDICINE

## 2022-01-14 RX ORDER — FLUOCINONIDE 0.5 MG/G
OINTMENT TOPICAL 2 TIMES DAILY
Qty: 15 G | Refills: 0 | Status: SHIPPED | OUTPATIENT
Start: 2022-01-14

## 2022-01-14 RX ORDER — ECONAZOLE NITRATE 10 MG/G
CREAM TOPICAL
Qty: 15 G | Refills: 0 | Status: SHIPPED | OUTPATIENT
Start: 2022-01-14

## 2022-01-14 RX ORDER — FINASTERIDE 5 MG/1
2.5 TABLET, FILM COATED ORAL DAILY
Qty: 90 TABLET | Refills: 1 | Status: SHIPPED | OUTPATIENT
Start: 2022-01-14

## 2022-01-14 RX ORDER — DICLOFENAC SODIUM 10 MG/G
GEL TOPICAL
COMMUNITY

## 2022-01-14 NOTE — ASSESSMENT & PLAN NOTE
Pain has improved, mostly asymptomatic, reviewed again in detail when to consider surgery & expectations from surgery, will ultimately defer to PT and surgeon

## 2022-01-14 NOTE — PROGRESS NOTES
Gita Eller is a 78 y.o. male who was seen in clinic today (1/14/2022) for a full physical.          Assessment & Plan:   Below is the assessment and plan developed based on review of pertinent history, physical exam, labs, studies, and medications. 1. Medicare annual wellness visit, subsequent  2. Advanced care planning/counseling discussion  3. Tinnitus of left ear  Assessment & Plan:  Stable, now with hearing loss, ear exam is normal, see AVS for list of audiologist for evaluation   4. Chronic right shoulder pain  Assessment & Plan:  Pain has improved, mostly asymptomatic, reviewed again in detail when to consider surgery & expectations from surgery, will ultimately defer to PT and surgeon   5. 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 , Normal, Disp-90 Tablet, R-1  7. Abnormal CT scan of lung  Assessment & Plan:  Asymptomatic, will defer repeat imaging but will check labs   Orders:  -     METABOLIC PANEL, COMPREHENSIVE; Future  -     CBC WITH AUTOMATED DIFF; Future    Follow-up and Dispositions    · Return in about 1 year (around 1/14/2023), or if symptoms worsen or fail to improve. Subjective:   Suleman Torres is here today for a full physical.      Annual Wellness Visit- Subsequent Visit    Since last visit: has seen 2 different surgeons about his shoulder      End of Life Planning:  This was discussed with him today and he has an advanced directive - a copy has been provided. Reviewed DNR/DNI and patient is not interested. Depression Screen:  3 most recent PHQ Screens 1/14/2022   Little interest or pleasure in doing things Not at all   Feeling down, depressed, irritable, or hopeless Not at all   Total Score PHQ 2 0   Trouble falling or staying asleep, or sleeping too much -   Feeling tired or having little energy -   Poor appetite, weight loss, or overeating -   Feeling bad about yourself - or that you are a failure or have let yourself or your family down -   Trouble concentrating on things such as school, work, reading, or watching TV -   Moving or speaking so slowly that other people could have noticed; or the opposite being so fidgety that others notice -   Thoughts of being better off dead, or hurting yourself in some way -   PHQ 9 Score -   How difficult have these problems made it for you to do your work, take care of your home and get along with others -         Fall Risk:   Fall Risk Assessment, last 12 mths 1/14/2022   Able to walk? Yes   Fall in past 12 months? 0   Do you feel unsteady? 0   Are you worried about falling 0       Abuse Screen:  Abuse Screening Questionnaire 1/14/2022   Do you ever feel afraid of your partner? N   Are you in a relationship with someone who physically or mentally threatens you? N   Is it safe for you to go home?  Y       Do you average more than 1 drink per night or more than 7 drinks a week: No    In the past three months have you have had more than 4 drinks containing alcohol on one occasion: No          Health Maintenance:   Daily Aspirin: no  AAA Screening: n/a: never smoked    Immunizations:   Covid: up to date   Influenza: up to date   Tetanus: up to date   Shingles: up to date   Pneumonia: up to date  Cancer screening:   Prostate: guidelines reviewed, monitored by urologist  Colon: guidelines reviewed, up to date    Functional Ability and Level of Safety:    Hearing: He reports it is slightly worse, asking for a hearing evaluation    Cognition Screen:   Has your family/caregiver stated any concerns about your memory: no  Cognitive Screening: Normal - Clock Drawing Test     Ambulation: with no difficulty     Activities of Daily Living: The home contains: no safety equipment. Patient does total self care  Exercise: very active    Adult Nutrition Screen:  No risk factors noted. Patient Care Team:  Alyse Lyons MD as PCP - General (Internal Medicine)  Alyse Lyons MD as PCP - [facility] Empaneled Provider  Vitaliy Camarena MD (Urology)  Min Aponte MD (Hematology and Oncology)  Juma Ham MD (Pulmonary Disease)  Nguyễn Robles MD (General Surgery)  [facility])       The following sections were reviewed & updated as appropriate: Problem List, Allergies, PMH, PSH, FH, and SH. Prior to Admission medications    Medication Sig Start Date End Date Taking? Authorizing Provider   diclofenac (VOLTAREN) 1 % gel Apply  to affected area. Yes Provider, Historical   fluocinoNIDE (LIDEX) 0.05 % ointment Apply  to affected area two (2) times a day. 3/19/21  Yes Roman FLAHERTY NP   econazole nitrate (SPECTAZOLE) 1 % topical cream Apply to effected as needed twice a day. 3/19/21  Yes Roman FLAHERTY NP   finasteride (PROSCAR) 5 mg tablet Take 0.5 Tabs by mouth daily. 10/21/19  Yes Provider, Historical   ciclopirox (PENLAC) 8 % solution APPLY OVER THE PREVIOUS APPLICATION TO NAIL AND SURROUNDING AREA DAILY FOR 7 DAYS. ON 7TH DAY REMOVE LACQUER WITH ALCOHOL AND REPEAT CYCLE  Patient not taking: Reported on 1/14/2022 10/13/21   Provider, Historical   sildenafil, antihypertensive, (REVATIO) 20 mg tablet Take 1-5 tablets by mouth prior to sexual activity as needed. Patient not taking: Reported on 11/17/2021 6/6/19 1/14/22  Alyse Lyons MD          Review of Systems   Constitutional: Negative for chills and fever. Respiratory: Negative for cough and shortness of breath. Cardiovascular: Negative for chest pain and palpitations. Gastrointestinal: Negative for abdominal pain, blood in stool, constipation, diarrhea, heartburn, nausea and vomiting. Genitourinary:        He reports inability to ejaculate, no pain, still gets pleasure. He reports having had a w/u with urology that was negative   Musculoskeletal: Negative for joint pain (No shoulder pain at this time) and myalgias. Neurological: Negative for tingling, focal weakness and headaches. Endo/Heme/Allergies: Does not bruise/bleed easily. Psychiatric/Behavioral: Negative for depression. The patient is not nervous/anxious and does not have insomnia. He requested a list of counselors last month. He reports a close family friend passed w/ pancreatic cancer. He was going thru a rough patch but it is improved. Never got into see a specialist       Objective:   Physical Exam  Constitutional:       General: He is not in acute distress. Appearance: Normal appearance. Eyes:      Conjunctiva/sclera: Conjunctivae normal.   Cardiovascular:      Rate and Rhythm: Regular rhythm. Heart sounds: No murmur heard. Pulmonary:      Effort: Pulmonary effort is normal.      Breath sounds: Normal breath sounds. No decreased breath sounds or wheezing. Abdominal:      General: Bowel sounds are normal.      Palpations: Abdomen is soft. Tenderness: There is no abdominal tenderness. Musculoskeletal:      Right lower leg: No edema. Left lower leg: No edema.    Psychiatric:         Mood and Affect: Mood and affect normal.          Visit Vitals  /66   Pulse 82   Temp 97.8 °F (36.6 °C) (Temporal)   Resp 18   Ht 5' 9.75\" (1.772 m)   Wt 192 lb (87.1 kg)   SpO2 96%   BMI 27.75 kg/m²         Woodrow Danielle MD

## 2022-01-14 NOTE — ACP (ADVANCE CARE PLANNING)
Advance Care Planning   Advance Care Planning (ACP) Physician/NP/PA (Provider) Conversation    Date of ACP Conversation: 01/14/22  Persons included in Conversation:  patient  Length of ACP Conversation in minutes: <16 minutes (Non-Billable)    Authorized Decision Maker (if patient is incapable of making informed decisions):   Named in Advance Directive or Healthcare Power of       Primary Decision Maker: Brittany Arauz - Friend    Primary Decision Maker: Bri, [address] [place] [address]    He has an advanced directive - a copy has been provided & still reflects him wishes. Reviewed DNR/DNI and patient is not interested- elects Full Code (attempt resuscitation).        Leigh Murdock MD

## 2022-03-18 PROBLEM — Z86.19 HISTORY OF TINEA CRURIS: Status: ACTIVE | Noted: 2022-01-14

## 2022-03-19 PROBLEM — M25.511 CHRONIC RIGHT SHOULDER PAIN: Status: ACTIVE | Noted: 2021-11-17

## 2022-03-19 PROBLEM — L64.9 MALE PATTERN BALDNESS: Status: ACTIVE | Noted: 2022-01-14

## 2022-03-19 PROBLEM — G89.29 CHRONIC RIGHT SHOULDER PAIN: Status: ACTIVE | Noted: 2021-11-17

## 2022-03-25 ENCOUNTER — NURSE TRIAGE (OUTPATIENT)
Dept: OTHER | Facility: CLINIC | Age: 77
End: 2022-03-25

## 2022-03-25 NOTE — TELEPHONE ENCOUNTER
Received call from Tati Bedolla at [facility] with [employer]. Subjective: Caller states \"I'm fatigued and Dr. Jelani Antonio has looked at this before. I'm in good health and am very active. I work for a few hours and then I'm tired and I have to go home and lay down. \"     Current Symptoms: Fatigue    Onset: 3-4 years     Associated Symptoms: reduced activity    Pain Severity: Denies pain at time of call- doing physical therapy for shoulder    Temperature: Denies fever and feeling feverish/chills    What has been tried: Rest     LMP: NA Pregnant: NA    Recommended disposition: See in Office Within 2 Weeks    Care advice provided, patient verbalizes understanding; denies any other questions or concerns; instructed to call back for any new or worsening symptoms. Patient/Caller agrees with recommended disposition; writer provided warm transfer to Cyndie at [facility] for appointment scheduling. Attention Provider: Thank you for allowing me to participate in the care of your patient. The patient was connected to triage in response to information provided to the ECC. Please do not respond through this encounter as the response is not directed to a shared pool.     Reason for Disposition   Fatigue is a chronic symptom (recurrent or ongoing AND present > 4 weeks)    Additional Information   Negative: Fatigue (i.e., tires easily, decreased energy) and persists > 1 week     Chronic issue- has been evaluated in the past    Protocols used: WEAKNESS (GENERALIZED) AND FATIGUE-ADULT-[place]

## 2022-03-30 ENCOUNTER — OFFICE VISIT (OUTPATIENT)
Dept: INTERNAL MEDICINE CLINIC | Age: 77
End: 2022-03-30
Payer: MEDICARE

## 2022-03-30 VITALS
HEIGHT: 70 IN | WEIGHT: 192 LBS | BODY MASS INDEX: 27.49 KG/M2 | OXYGEN SATURATION: 97 % | RESPIRATION RATE: 16 BRPM | HEART RATE: 76 BPM | TEMPERATURE: 98 F | DIASTOLIC BLOOD PRESSURE: 64 MMHG | SYSTOLIC BLOOD PRESSURE: 123 MMHG

## 2022-03-30 DIAGNOSIS — R53.83 FATIGUE, UNSPECIFIED TYPE: Primary | ICD-10-CM

## 2022-03-30 PROCEDURE — G8427 DOCREV CUR MEDS BY ELIG CLIN: HCPCS | Performed by: INTERNAL MEDICINE

## 2022-03-30 PROCEDURE — 99213 OFFICE O/P EST LOW 20 MIN: CPT | Performed by: INTERNAL MEDICINE

## 2022-03-30 PROCEDURE — 93005 ELECTROCARDIOGRAM TRACING: CPT | Performed by: INTERNAL MEDICINE

## 2022-03-30 PROCEDURE — 1101F PT FALLS ASSESS-DOCD LE1/YR: CPT | Performed by: INTERNAL MEDICINE

## 2022-03-30 PROCEDURE — 93010 ELECTROCARDIOGRAM REPORT: CPT | Performed by: INTERNAL MEDICINE

## 2022-03-30 PROCEDURE — G8419 CALC BMI OUT NRM PARAM NOF/U: HCPCS | Performed by: INTERNAL MEDICINE

## 2022-03-30 PROCEDURE — 3017F COLORECTAL CA SCREEN DOC REV: CPT | Performed by: INTERNAL MEDICINE

## 2022-03-30 PROCEDURE — G0463 HOSPITAL OUTPT CLINIC VISIT: HCPCS | Performed by: INTERNAL MEDICINE

## 2022-03-30 PROCEDURE — G8510 SCR DEP NEG, NO PLAN REQD: HCPCS | Performed by: INTERNAL MEDICINE

## 2022-03-30 PROCEDURE — G8536 NO DOC ELDER MAL SCRN: HCPCS | Performed by: INTERNAL MEDICINE

## 2022-03-30 NOTE — ASSESSMENT & PLAN NOTE
Chronic issue, fluctuating control, differential reviewed, favor sleep vs mood, more then pulmonary vs cardiac etiology. Will get him set for PFT's and sleep study. Reviewed when to get stress test.  Will defer labs as these have been stable. Did offer a trial of medications for mood but he declined & wants to start w/ counselor. Red flags and expectations were reviewed & discussed with the him.

## 2022-03-30 NOTE — PROGRESS NOTES
Melissa Richardson is a 78 y.o. male who was seen in clinic today (3/30/2022) for an acute visit. Assessment & Plan:   Below is the assessment and plan developed based on review of pertinent history, physical exam, labs, studies, and medications. 1. Fatigue, unspecified type  Assessment & Plan:  Chronic issue, fluctuating control, differential reviewed, favor sleep vs mood, more then pulmonary vs cardiac etiology. Will get him set for PFT's and sleep study. Reviewed when to get stress test.  Will defer labs as these have been stable. Did offer a trial of medications for mood but he declined & wants to start w/ counselor. Red flags and expectations were reviewed & discussed with the him. Orders:  -     AMB POC EKG ROUTINE W/ 12 LEADS, INTER & REP  -     PULMONARY FUNCTION TEST; Future  -     SLEEP MEDICINE REFERRAL      On this date 03/30/2022 I have spent 26 minutes reviewing previous notes, test results and face to face with the patient discussing the diagnosis and importance of compliance with the treatment plan as well as documenting on the day of the visit. Follow-up and Dispositions    · Return if symptoms worsen or fail to improve. Subjective/Objective:   Tor Montana was seen today for Fatigue    He reports for the last few years has felt fatigued. He reports currently doing 90 minutes of supervised PT and he feels he needs to nap. He reports needing to sleep for 2 hrs. Some mornings he wakes up and then needs to go back to sleep for 10 hrs per day. He reports previously only needed 7-8 hrs of sleep. 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 He admits still grieving mother and family friend's death. He is asking if he is clinically depressed. He has looked into a counselor and this will start in May. While he is active he denies any h/a, chest pain/pressure, palpitations, SOB/ONTIVEROS, or abdominal pain.   Only change from his standpoint is tinnitus in the L ear is worse. He reports stress test done 10 yrs ago, he thinks [facility], but records unavailable. He said it was normal.      Prior to Admission medications    Medication Sig Start Date End Date Taking? Authorizing Provider   diclofenac (VOLTAREN) 1 % gel Apply  to affected area. Yes Provider, Historical   finasteride (PROSCAR) 5 mg tablet Take 0.5 Tablets by mouth daily. 1/14/22  Yes Isaias Carrero MD   econazole nitrate (SPECTAZOLE) 1 % topical cream Apply to effected as needed twice a day. 1/14/22  Yes Isaais Carrero MD   fluocinoNIDE (LIDEX) 0.05 % ointment Apply  to affected area two (2) times a day. 1/14/22  Yes Isaias Carrero MD   ciclopirox (PENLAC) 8 % solution APPLY OVER THE PREVIOUS APPLICATION TO NAIL AND SURROUNDING AREA DAILY FOR 7 DAYS. ON 7TH DAY REMOVE LACQUER WITH ALCOHOL AND REPEAT CYCLE  Patient not taking: Reported on 1/14/2022 10/13/21 3/30/22  Provider, Historical        Visit Vitals  /64   Pulse 76   Temp 98 °F (36.7 °C) (Oral)   Resp 16   Ht 5' 9.75\" (1.772 m)   Wt 192 lb (87.1 kg)   SpO2 97%   BMI 27.75 kg/m²         Advised him to call back or return to office if symptoms worsen/change/persist.  Discussed expected course/resolution/complications of diagnosis in detail with patient. Medication risks/benefits/costs/interactions/alternatives discussed with patient.     Woodrow Danielle MD

## 2022-06-16 ENCOUNTER — TELEPHONE (OUTPATIENT)
Dept: SLEEP MEDICINE | Age: 77
End: 2022-06-16

## 2022-06-27 ENCOUNTER — HOSPITAL ENCOUNTER (OUTPATIENT)
Dept: PULMONOLOGY | Age: 77
Discharge: HOME | End: 2022-06-27
Attending: INTERNAL MEDICINE
Payer: MEDICARE

## 2022-06-27 DIAGNOSIS — R53.83 FATIGUE, UNSPECIFIED TYPE: ICD-10-CM

## 2022-06-27 PROCEDURE — 94010 BREATHING CAPACITY TEST: CPT

## 2022-07-15 NOTE — ANESTHESIA PREPROCEDURE EVALUATION
Anesthetic History   No history of anesthetic complications            Review of Systems / Medical History  Patient summary reviewed, nursing notes reviewed and pertinent labs reviewed    Pulmonary  Within defined limits                 Neuro/Psych   Within defined limits           Cardiovascular              Hyperlipidemia    Exercise tolerance: >4 METS     GI/Hepatic/Renal     GERD           Endo/Other  Within defined limits           Other Findings   Comments: Testicular cancer  tinnitus           Physical Exam    Airway  Mallampati: II  TM Distance: 4 - 6 cm  Neck ROM: normal range of motion   Mouth opening: Normal     Cardiovascular    Rhythm: regular  Rate: normal         Dental  No notable dental hx       Pulmonary  Breath sounds clear to auscultation               Abdominal  Abdominal exam normal       Other Findings            Anesthetic Plan    ASA: 2  Anesthesia type: MAC          Induction: Intravenous  Anesthetic plan and risks discussed with: Patient Called patient  And informed her Stop the vinegar and water. If symptoms don't improve with the medication, then needs to be seen. She understood and had no questions.

## 2022-07-26 ENCOUNTER — OFFICE VISIT (OUTPATIENT)
Dept: INTERNAL MEDICINE CLINIC | Age: 77
End: 2022-07-26
Payer: MEDICARE

## 2022-07-26 VITALS
OXYGEN SATURATION: 97 % | TEMPERATURE: 98.3 F | HEART RATE: 77 BPM | RESPIRATION RATE: 18 BRPM | WEIGHT: 187 LBS | DIASTOLIC BLOOD PRESSURE: 69 MMHG | HEIGHT: 70 IN | BODY MASS INDEX: 26.77 KG/M2 | SYSTOLIC BLOOD PRESSURE: 120 MMHG

## 2022-07-26 DIAGNOSIS — R53.83 FATIGUE, UNSPECIFIED TYPE: Primary | ICD-10-CM

## 2022-07-26 DIAGNOSIS — S76.312A STRAIN OF LEFT HAMSTRING, INITIAL ENCOUNTER: ICD-10-CM

## 2022-07-26 PROCEDURE — G0463 HOSPITAL OUTPT CLINIC VISIT: HCPCS | Performed by: INTERNAL MEDICINE

## 2022-07-26 PROCEDURE — 1101F PT FALLS ASSESS-DOCD LE1/YR: CPT | Performed by: INTERNAL MEDICINE

## 2022-07-26 PROCEDURE — G8427 DOCREV CUR MEDS BY ELIG CLIN: HCPCS | Performed by: INTERNAL MEDICINE

## 2022-07-26 PROCEDURE — G8417 CALC BMI ABV UP PARAM F/U: HCPCS | Performed by: INTERNAL MEDICINE

## 2022-07-26 PROCEDURE — G8510 SCR DEP NEG, NO PLAN REQD: HCPCS | Performed by: INTERNAL MEDICINE

## 2022-07-26 PROCEDURE — 3017F COLORECTAL CA SCREEN DOC REV: CPT | Performed by: INTERNAL MEDICINE

## 2022-07-26 PROCEDURE — G8536 NO DOC ELDER MAL SCRN: HCPCS | Performed by: INTERNAL MEDICINE

## 2022-07-26 PROCEDURE — 99213 OFFICE O/P EST LOW 20 MIN: CPT | Performed by: INTERNAL MEDICINE

## 2022-07-26 NOTE — ASSESSMENT & PLAN NOTE
Chronic issue, unchanged, still undergoing work up. Did review it could be neurological based on some of these new findings. I doubt cardiac or pulmonary. Agree w/ specialist follow up. Will monitor.   He is declines this could be anxiety

## 2022-07-26 NOTE — PROGRESS NOTES
Lynn Santiago is a 78 y.o. male who was seen in clinic today (7/26/2022) for an acute visit. Assessment & Plan:   Below is the assessment and plan developed based on review of pertinent history, physical exam, labs, studies, and medications. 1. Fatigue, unspecified type  Assessment & Plan:  Chronic issue, unchanged, still undergoing work up. Did review it could be neurological based on some of these new findings. I doubt cardiac or pulmonary. Agree w/ specialist follow up. Will monitor. He is declines this could be anxiety  2. Strain of left hamstring, initial encounter  Comments:  new dx, differential reviewed, favor strain, will monitor, work on stretching. Red flags & expectations reviewed. Follow-up and Dispositions    Return if symptoms worsen or fail to improve. Subjective/Objective:   Rohith Almonte was seen today for Fatigue and Sore Throat    PFT's done, still waiting on official report. He has sleep study scheduled for next visit. He has an appointment w/ ENT next week. He has an appointment w/ neurology in October. New complaints are trouble singing. He can't hit some notes he previously had no issues with. He also reports a sore throat. He also has noticed increased in tinnitus in his L ear. Last week he was gardening. He was kneeling for about 6 hours and afterwards noticed some pain in the L hamstring. It is improving. He is using BenGay. He denies any anxious and depression. Prior to Admission medications    Medication Sig Start Date End Date Taking? Authorizing Provider   diclofenac (VOLTAREN) 1 % gel Apply  to affected area. Yes Provider, Historical   finasteride (PROSCAR) 5 mg tablet Take 0.5 Tablets by mouth daily. 1/14/22  Yes Leah Lamb MD   econazole nitrate (SPECTAZOLE) 1 % topical cream Apply to effected as needed twice a day.  1/14/22  Yes Leah Lamb MD   fluocinoNIDE (LIDEX) 0.05 % ointment Apply  to affected area two (2) times a day. 1/14/22  Yes Yue Suh MD           Physical Exam  HENT:      Mouth/Throat:      Mouth: Mucous membranes are moist.      Tongue: No lesions. Tongue does not deviate from midline. Palate: No mass. Pharynx: Uvula midline. No uvula swelling. Tonsils: No tonsillar exudate. Musculoskeletal:      Left upper leg: Tenderness (posterior lateral hamstring, no pain w/ flexion/extension, pain w/ sitting crossed legged) present. Lymphadenopathy:      Cervical: No cervical adenopathy. Visit Vitals  /69 (BP 1 Location: Left arm, BP Patient Position: Sitting, BP Cuff Size: Adult)   Pulse 77   Temp 98.3 °F (36.8 °C) (Temporal)   Resp 18   Ht 5' 9.5\" (1.765 m)   Wt 187 lb (84.8 kg)   SpO2 97%   BMI 27.22 kg/m²         Advised him to call back or return to office if symptoms worsen/change/persist.  Discussed expected course/resolution/complications of diagnosis in detail with patient. Medication risks/benefits/costs/interactions/alternatives discussed with patient.     Juliette Lazaro MD

## 2022-07-28 ENCOUNTER — VIRTUAL VISIT (OUTPATIENT)
Dept: SLEEP MEDICINE | Age: 77
End: 2022-07-28
Payer: MEDICARE

## 2022-07-28 VITALS
WEIGHT: 188 LBS | HEIGHT: 71 IN | SYSTOLIC BLOOD PRESSURE: 97 MMHG | BODY MASS INDEX: 26.32 KG/M2 | OXYGEN SATURATION: 95 % | DIASTOLIC BLOOD PRESSURE: 58 MMHG

## 2022-07-28 DIAGNOSIS — G47.10 HYPERSOMNIA: Primary | ICD-10-CM

## 2022-07-28 DIAGNOSIS — R40.0 SOMNOLENCE: ICD-10-CM

## 2022-07-28 DIAGNOSIS — G47.11 IDIOPATHIC HYPERSOMNIA WITH LONG SLEEP TIME: ICD-10-CM

## 2022-07-28 DIAGNOSIS — G47.33 OBSTRUCTIVE SLEEP APNEA (ADULT) (PEDIATRIC): ICD-10-CM

## 2022-07-28 PROCEDURE — 1101F PT FALLS ASSESS-DOCD LE1/YR: CPT | Performed by: INTERNAL MEDICINE

## 2022-07-28 PROCEDURE — 1123F ACP DISCUSS/DSCN MKR DOCD: CPT | Performed by: INTERNAL MEDICINE

## 2022-07-28 PROCEDURE — G8536 NO DOC ELDER MAL SCRN: HCPCS | Performed by: INTERNAL MEDICINE

## 2022-07-28 PROCEDURE — G8427 DOCREV CUR MEDS BY ELIG CLIN: HCPCS | Performed by: INTERNAL MEDICINE

## 2022-07-28 PROCEDURE — 99204 OFFICE O/P NEW MOD 45 MIN: CPT | Performed by: INTERNAL MEDICINE

## 2022-07-28 PROCEDURE — G8432 DEP SCR NOT DOC, RNG: HCPCS | Performed by: INTERNAL MEDICINE

## 2022-07-28 PROCEDURE — G8417 CALC BMI ABV UP PARAM F/U: HCPCS | Performed by: INTERNAL MEDICINE

## 2022-07-28 PROCEDURE — 3017F COLORECTAL CA SCREEN DOC REV: CPT | Performed by: INTERNAL MEDICINE

## 2022-07-28 NOTE — PROGRESS NOTES
217 [facility]., [facility], [address]  Tel.  [phone]  Fax. 1404 [facility], [address] [facility]  Tel.  [phone]  Fax. [phone] [address] [place] [address]  Tel.  [phone]  Fax. [phone]       Telemedicine visit performed with verbal consent of the patient. Patient called and identity confirmed with 2 patient identifers  Patient was seen at [address] is a 78 y.o. male who was seen by synchronous (real-time) audio-video technology on 7/28/2022. Jessica Fry, who was evaluated through a synchronous (real-time) audio-video encounter, and/or his healthcare decision maker, is aware that it is a billable service, which includes applicable co-pays, with coverage as determined by his insurance carrier. He provided verbal consent to proceed and patient identification was verified. This visit was conducted pursuant to the emergency declaration under the [facility], [address] [place] [address] authority and the [employer] and [employer] Act. A caregiver was present when appropriate. Ability to conduct physical exam was limited. The patient was located at home in a state where the provider was licensed to provide care. --Kerri Willams MD on 7/28/2022 at 10:51 AM                                 [address] [facility], [address]  Tel.  [phone]  Fax. 1404 [facility], [address] [facility]  Tel.  [phone]  Fax. [phone] 9250 [facility] 33  Tel.  [phone]  Fax. [phone]         Subjective:        I was in the office while conducting this encounter. Jessica Fry is an 78 y.o. male self-referred for evaluation for a sleep disorder. He complains of fatigue  associated with feels sleepy during the day, take naps during the day.   Symptoms began 3 years ago, gradually worsening since that time. He usually can fall asleep in a few minutes. He denies falling asleep while driving. He is very active during the day. He swims half to a whole mile daily. He works in the yard. Kiran Herbert does not wake up frequently at night. He is not bothered by waking up too early and left unable to get back to sleep. He actually sleeps about 8 hours at night and wakes up about 1 times during the night. He does not work shifts:  . He denies sleep paralysis or sleep related hallucinations. Denies cataplexy symptoms. Baljinder Carr indicates he does not get too little sleep at night. His bedtime is 1000. He awakens at 0630. He does take naps. He takes 7 naps a week lasting 1. He has the following observed behaviors: Grinding teeth;  . Other remarks: he often awakens at 4 am, sometimes goes right back to sleep, other times he is awake for a couple of hours developing an idea that has come to him in sleep or sketching the idea. +vivid dreams  He does have hoarseness all day. He does have a dry throat in the morning. [place] Sleepiness Score: 6      Allergies   Allergen Reactions    Lamisil [Terbinafine] Other (comments)     STOMACH ACHE    Other Medication Other (comments)     Does not want to take narcotics, hypersensitive         Current Outpatient Medications:     diclofenac (VOLTAREN) 1 % gel, Apply  to affected area., Disp: , Rfl:     finasteride (PROSCAR) 5 mg tablet, Take 0.5 Tablets by mouth daily. , Disp: 90 Tablet, Rfl: 1    econazole nitrate (SPECTAZOLE) 1 % topical cream, Apply to effected as needed twice a day., Disp: 15 g, Rfl: 0    fluocinoNIDE (LIDEX) 0.05 % ointment, Apply  to affected area two (2) times a day.  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 He  has a past surgical history that includes hx heent (11/2004); hx orthopaedic (Left, 2000); hx orthopaedic (Right, 2005); hx orchiectomy (Right, 1984); hx lap cholecystectomy (2011); hx colonoscopy (8/27/03); hx colonoscopy (3/11/09); hx hernia repair (Bilateral, 2017); hx other surgical (Bilateral, 04/2019); hx hair transplant (11/2019); and colonoscopy (N/A, 2/26/2020). He family history includes Atrial Fibrillation in his sister; Breast Cancer in his sister; Hearing Impairment in his brother and sister; Heart Attack in his father; [place]le [place] in his mother; Other in his mother. He  reports that he has never smoked. He has never used smokeless tobacco. He reports current alcohol use. He reports that he does not use drugs. Review of Systems:  Constitutional:  No significant weight loss or weight gain  Eyes:  No blurred vision  CVS:  No significant chest pain  Pulm:  No significant shortness of breath  GI:  No significant nausea or vomiting, rare heartburn  :  No significant nocturia  Musculoskeletal:  No significant joint pain at night  Skin:  No significant rashes  Neuro:  No significant dizziness   Psych:  No active mood issues    Sleep Review of Systems: notable for no difficulty falling asleep; infrequent awakenings at night;  ++ dreaming noted; no nightmares ; no early morning headaches; no memory problems; no concentration issues;        Objective:     Visit Vitals  BP (!) 97/58   Ht 5' 11\" (1.803 m)   Wt 188 lb (85.3 kg)   SpO2 95%   BMI 26.22 kg/m²         Vital Signs: (As obtained by patient/caregiver at home)        Constitutional: [x] Appears well-developed and well-nourished [x] No apparent distress      [] Abnormal -     Mental status: [x] Alert and awake  [x] Oriented to person/place/time [x] Able to follow commands    [] Abnormal -     Eyes:   EOM    [x]  Normal    [] Abnormal -   Sclera  [x]  Normal [] Abnormal -          Discharge [x]  None visible   [] Abnormal -     HENT: [x] Normocephalic, atraumatic  [] Abnormal -   [x] Mouth/Throat: Mucous membranes are moist                    External Ears [x] Normal  [] Abnormal -    Neck: [x] No visualized mass [] Abnormal -     Pulmonary/Chest: [x] Respiratory effort normal   [x] No visualized signs of difficulty breathing or respiratory distress        [] Abnormal -       Neurological:        [x] No Facial Asymmetry (Cranial nerve 7 motor function) (limited exam due to video visit)          [x] No gaze palsy        [] Abnormal -          Skin:        [x] No significant exanthematous lesions or discoloration noted on facial skin         [] Abnormal -            Psychiatric:       [x] Normal Affect [] Abnormal -       Other pertinent observable physical exam findings:-          Assessment:       ICD-10-CM ICD-9-CM    1. Hypersomnia  G47.10 780.54 POLYSOMNOGRAPHY 1 NIGHT      POLYSOMNOGRAPHY (MSLT)      DRUG ABUSE PROF, URINE (SEVEN DRUGS), MS COFIRM      2. Obstructive sleep apnea (adult) (pediatric)  G47.33 327.23 POLYSOMNOGRAPHY 1 NIGHT      POLYSOMNOGRAPHY (MSLT)      DRUG ABUSE PROF, URINE (SEVEN DRUGS), MS COFIRM      3. Idiopathic hypersomnia with long sleep time   G47.11 327.11 POLYSOMNOGRAPHY (MSLT)      DRUG ABUSE PROF, URINE (SEVEN DRUGS), MS COFIRM      4. Somnolence   R40.0 780.09 DRUG ABUSE PROF, URINE (SEVEN DRUGS), MS COFIRM            Plan:       1-3. Hypersomnia despite adequate sleep time and still requiring naps/somnolence- PSG/MSLT for further evaluation    Proper sleep hygiene reviewed  I will see him in follow-up to review results. 24 At risk for sleep apnea-STOP BANG-3  PSG for further evaluation . If sleep apnea found, will defer the MSLT until apnea treated  * He was provided information on sleep apnea including coresponding risk factors and the importance of proper treatment.    *  * Counseling was provided regarding proper sleep hygiene, appropriate sleep schedule, need for sleep environment safety and safe driving. * Effect of sleep disturbance on weight was reviewed. We have recommended a dedicated weight loss through appropriate diet and an exercise regimen as significant weight reduction has been shown to reduce severity of obstructive sleep apnea. Follow up to review results. All of his questions were addressed. Thank you for allowing us to participate in your patient's medical care. We'll keep you updated on these investigations. Pursuant to the emergency declaration under the [address] [place] [address], [facility] waiver authority and the [employer] Act, this Virtual  Visit was conducted, with patient's consent, to reduce the patient's risk of exposure to COVID-19 and provide continuity of care for an established patient. Services were provided through a video synchronous discussion virtually to substitute for in-person clinic visit.     Nupur Ortiz MD    Electronically signed by    Temitope Phelps MD  Diplomate in Sleep Medicine  [facility]

## 2022-07-28 NOTE — PATIENT INSTRUCTIONS
[address] [facility] [address] [place], [address]  Tel.  [phone]  Fax. 100 [facility] 60  [place], [address] [facility]  Tel.  [phone]  Fax. [phone] 9250 [facility] Pramod Hernandez  Tel.  [phone]  Fax. [phone]     PROPER SLEEP HYGIENE    What to avoid  Do not have drinks with caffeine, such as coffee or black tea, for 8 hours before bed. Do not smoke or use other types of tobacco near bedtime. Nicotine is a stimulant and can keep you awake. Avoid drinking alcohol late in the evening, because it can cause you to wake in the middle of the night. Do not eat a big meal close to bedtime. If you are hungry, eat a light snack. Do not drink a lot of water close to bedtime, because the need to urinate may wake you up during the night. Do not read or watch TV in bed. Use the bed only for sleeping and sexual activity. What to try  Go to bed at the same time every night, and wake up at the same time every morning. Do not take naps during the day. Keep your bedroom quiet, dark, and cool. Get regular exercise, but not within 3 to 4 hours of your bedtime. .  Sleep on a comfortable pillow and mattress. If watching the clock makes you anxious, turn it facing away from you so you cannot see the time. If you worry when you lie down, start a worry book. Well before bedtime, write down your worries, and then set the book and your concerns aside. Try meditation or other relaxation techniques before you go to bed. If you cannot fall asleep, get up and go to another room until you feel sleepy. Do something relaxing. Repeat your bedtime routine before you go to bed again. Make your house quiet and calm about an hour before bedtime. Turn down the lights, turn off the TV, log off the computer, and turn down the volume on music. This can help you relax after a busy day.     Drowsy Driving  The [address] [place] [address] Road Traffic Safety Administration cites drowsiness as a causing factor in more than 971,720 police reported crashes annually, resulting in 76,000 injuries and 1,500 deaths. Other surveys suggest 55% of people polled have driven while drowsy in the past year, 23% had fallen asleep but not crashed, 3% crashed, and 2% had and accident due to drowsy driving. Who is at risk? Young Drivers: One study of drowsy driving accidents states that 55% of the drivers were under 25 years. Of those, 75% were male. Shift Workers and Travelers: People who work overnight or travel across time zones frequently are at higher risk of experiencing Circadian Rhythm Disorders. They are trying to work and function when their body is programed to sleep. Sleep Deprived: Lack of sleep has a serious impact on your ability to pay attention or focus on a task. Consistently getting less than the average of 8 hours your body needs creates partial or cumulative sleep deprivation. Untreated Sleep Disorders: Sleep Apnea, Narcolepsy, R.L.S., and other sleep disorders (untreated) prevent a person from getting enough restful sleep. This leads to excessive daytime sleepiness and increases the risk for drowsy driving accidents by up to 7 times. Medications / Alcohol: Even over the counter medications can cause drowsiness. Medications that impair a drivers attention should have a warning label. Alcohol naturally makes you sleepy and on its own can cause accidents. Combined with excessive drowsiness its effects are amplified. Signs of Drowsy Driving:   * You don't remember driving the last few miles   * You may drift out of your addie   * You are unable to focus and your thoughts wander   * You may yawn more often than normal   * You have difficulty keeping your eyes open / nodding off   * Missing traffic signs, speeding, or tailgating  Prevention-   Good sleep hygiene, lifestyle and behavioral choices have the most impact on drowsy driving.  There is no substitute for sleep and the average person requires 8 hours nightly. If you find yourself driving drowsy, stop and sleep. Consider the sleep hygiene tips provided during your visit as well. Medication Refill Policy: Refills for all medications require 1 week advance notice. Please have your pharmacy fax a refill request. We are unable to fax, or call in \"controled substance\" medications and you will need to pick these prescriptions up from our office. [employer] Activation    Thank you for requesting access to [employer]. Please follow the instructions below to securely access and download your online medical record. [employer] allows you to send messages to your doctor, view your test results, renew your prescriptions, schedule appointments, and more. How Do I Sign Up? In your internet browser, go to [email or web address]. Click on the First Time User? Click Here link in the Sign In box. You will see the New Member Sign Up page. Enter your [employer] Access Code exactly as it appears below. You will not need to use this code after youve completed the sign-up process. If you do not sign up before the expiration date, you must request a new code. [employer] Access Code: Activation code not generated  Current [employer] Status: Active (This is the date your [employer] access code will )    Enter the last four digits of your Social Security Number (xxxx) and Date of Birth (mm/dd/yyyy) as indicated and click Submit. You will be taken to the next sign-up page. Create a [employer] ID. This will be your [employer] login ID and cannot be changed, so think of one that is secure and easy to remember. Create a [employer] password. You can change your password at any time. Enter your Password Reset Question and Answer. This can be used at a later time if you forget your password. Enter your e-mail address. You will receive e-mail notification when new information is available in [address] Click Sign Up.  You can now view and download portions of your medical record. Click the [employer] link to download a portable copy of your medical information. Additional Information    If you have questions, please call [phone]. Remember, [employer] is NOT to be used for urgent needs. For medical emergencies, dial 911.

## 2022-07-28 NOTE — Clinical Note
Thank you for the referral.  I will keep you informed of his progress.  [address], Rita Paniagua

## 2022-08-02 ENCOUNTER — TRANSCRIBE ORDER (OUTPATIENT)
Dept: SCHEDULING | Age: 77
End: 2022-08-02

## 2022-08-02 DIAGNOSIS — R13.14 DYSPHAGIA, PHARYNGOESOPHAGEAL PHASE: ICD-10-CM

## 2022-08-02 DIAGNOSIS — H93.13 TINNITUS, BILATERAL: Primary | ICD-10-CM

## 2022-08-02 DIAGNOSIS — R49.0 DYSPHONIA: ICD-10-CM

## 2022-08-09 ENCOUNTER — HOSPITAL ENCOUNTER (OUTPATIENT)
Dept: GENERAL RADIOLOGY | Age: 77
Discharge: HOME | End: 2022-08-09
Attending: PEDIATRICS
Payer: MEDICARE

## 2022-08-09 DIAGNOSIS — H93.13 TINNITUS, BILATERAL: ICD-10-CM

## 2022-08-09 DIAGNOSIS — R13.14 DYSPHAGIA, PHARYNGOESOPHAGEAL PHASE: ICD-10-CM

## 2022-08-09 DIAGNOSIS — R49.0 DYSPHONIA: ICD-10-CM

## 2022-08-09 PROCEDURE — 74220 X-RAY XM ESOPHAGUS 1CNTRST: CPT

## 2022-09-20 ENCOUNTER — HOSPITAL ENCOUNTER (OUTPATIENT)
Dept: SLEEP MEDICINE | Age: 77
Discharge: HOME | End: 2022-09-20
Payer: MEDICARE

## 2022-09-20 DIAGNOSIS — G47.33 OBSTRUCTIVE SLEEP APNEA (ADULT) (PEDIATRIC): ICD-10-CM

## 2022-09-20 DIAGNOSIS — G47.10 HYPERSOMNIA: ICD-10-CM

## 2022-09-20 PROCEDURE — 95810 POLYSOM 6/> YRS 4/> PARAM: CPT | Performed by: SPECIALIST

## 2022-09-21 ENCOUNTER — TELEPHONE (OUTPATIENT)
Dept: SLEEP MEDICINE | Age: 77
End: 2022-09-21

## 2022-09-21 ENCOUNTER — DOCUMENTATION ONLY (OUTPATIENT)
Dept: SLEEP MEDICINE | Age: 77
End: 2022-09-21

## 2022-09-21 VITALS
TEMPERATURE: 98 F | HEART RATE: 78 BPM | HEIGHT: 71 IN | SYSTOLIC BLOOD PRESSURE: 105 MMHG | OXYGEN SATURATION: 95 % | DIASTOLIC BLOOD PRESSURE: 71 MMHG | WEIGHT: 188 LBS | BODY MASS INDEX: 26.32 KG/M2

## 2022-09-21 DIAGNOSIS — G47.39 MIXED SLEEP APNEA: Primary | ICD-10-CM

## 2022-09-21 NOTE — PROGRESS NOTES
217 [facility] [place]. [place], [address]  Tel.  [phone]  Fax. [address] [facility]  [place] Gloria, [address] [facility]  Tel.  [phone]  Fax. [phone] [address] Pr[address]  Tel.  [phone]  Fax. [phone]     Sleep Study Technical Notes        PRE-Test:  Jerrell Pascual (: 1945) arrived in the lobby. Patient was greeted and screening questions asked. The patient was taken to the Sleep Center and taken directly to his/her room. Vitals:  Temperature (98.0)   BP (105/71)   SaO2 (95%)   Weight per patient (188)  Procedure explained to the patient and questions were answered. The patient expressed understanding of the procedure. Electrodes were applied without incident. The patient was placed in bed and the study was started. PAP mask acclimation for **min. Patient didn't tolerate mask. Sleep aid taken:  N      Acquisition Notes:  Lights off: 10:54pm    Respiratory events: hypopnea, central  ECG:  nsr  Snoring:  No snore noted  PAP titration:   Eliminated events:  Reduced events:  Events at  final pressure n/a  Desensitization Mask(s) Used: n/a  Positional therapy with: Other comments: Pt slept well, some apnea noted, but no snore. Patient had caregiver in attendance:  N  Patient watched TV or on phone after lights out for ** hours  Patient slept with positional therapy:  Y used  1 pillows  Patient slept with head of bed elevated:  N  Patient wore an oral appliance:  Y  Patient to bathroom 1 times  Pediatric Patient:  Parent accompanied patient: N  Parent slept in bed with patient: N      POST Test:  Patient was awakened. Electrodes were removed. The patient was discharged after answering the Post Questionnaire. Patient stated that he was alert and ok to drive. Equipment and room cleaned per infection control policy.

## 2022-09-28 NOTE — TELEPHONE ENCOUNTER
Patient called and identity confirmed with 2 patient identifers  Results reviewed with patient. See report for further details  Attended polysomnogram showed an AHI of 42/hour and lowest oxygen saturation was 82%. This is consistent with severe mixed  sleep apnea.      Based on these results, PAP therapy would be beneficial.   He is agreeable to PAP titration  All of his questions addressed

## 2022-09-30 ENCOUNTER — DOCUMENTATION ONLY (OUTPATIENT)
Dept: SLEEP MEDICINE | Age: 77
End: 2022-09-30

## 2022-10-03 ENCOUNTER — HOSPITAL ENCOUNTER (OUTPATIENT)
Dept: SLEEP MEDICINE | Age: 77
Discharge: HOME | End: 2022-10-03
Payer: MEDICARE

## 2022-10-03 DIAGNOSIS — G47.39 MIXED SLEEP APNEA: ICD-10-CM

## 2022-10-03 PROCEDURE — 95811 POLYSOM 6/>YRS CPAP 4/> PARM: CPT | Performed by: INTERNAL MEDICINE

## 2022-10-04 ENCOUNTER — DOCUMENTATION ONLY (OUTPATIENT)
Dept: SLEEP MEDICINE | Age: 77
End: 2022-10-04

## 2022-10-04 ENCOUNTER — PATIENT MESSAGE (OUTPATIENT)
Dept: SLEEP MEDICINE | Age: 77
End: 2022-10-04

## 2022-10-04 VITALS
HEART RATE: 73 BPM | HEIGHT: 71 IN | OXYGEN SATURATION: 97 % | SYSTOLIC BLOOD PRESSURE: 114 MMHG | TEMPERATURE: 98.2 F | DIASTOLIC BLOOD PRESSURE: 63 MMHG | WEIGHT: 188 LBS | BODY MASS INDEX: 26.32 KG/M2

## 2022-10-04 NOTE — PROGRESS NOTES
217 [facility], [address]  Tel.  [phone]  Fax. 100 [facility] 60  [place], [address] [facility]  Tel.  [phone]  Fax. [phone] 9250 [facility] Pramod Hernandez   Tel.  [phone]  Fax. [phone]     Sleep Study Technical Notes        PRE-Test:  Katarina Wheeler (: 1945) arrived in the lobby. Patient was greeted and screening questions asked. The patient was taken to the Sleep Center and taken directly to his/her room. Vitals:  Temperature (98.2)   BP (114/63)   SaO2 (97)   Weight per patient (188)  Procedure explained to the patient and many questions were answered. The patient expressed understanding of the procedure. Electrodes were applied without incident. The patient was placed in bed and the study was started. PAP mask acclimation for  5 min. Patient did tolerate mask. Sleep aid taken:  No      Acquisition Notes:  Lights off: 10:37 pm    Respiratory events: Hypopneas, OA, Central apneas  ECG:  NSR  Snoring: None  PAP titration: CPAP  Eliminated events: Hypopneas, central apneas  Reduced events: OA  Events at  final pressure 2  Desensitization Mask(s) Used: Dreamwear nasal pillows, ResMed N30i nasal medium, Marilee ff medium  Positional therapy with: Other comments: Numerous questions were answered and patient was educated throughout the night. Pt stated that he often will do a yoga activity where he holds his breath for approx 20 seconds to help himself fall asleep. Pt was reassured throughout the night due to anxiety. Pt complained of a dry throat and mouth due to absence of humidifiers in the sleep lab. He was informed that Dr. Romain Tirado would answer more questions for him during his next appointment.    Patient had caregiver in attendance:  No  Patient watched TV or on phone after lights out for 0 hours  Patient slept with positional therapy:  No  Patient slept with head of bed elevated:  No  Patient wore an oral appliance:  Pt did not use his mouth guard, he did bring it to the sleep study. Patient to bathroom 2 times        POST Test:  Patient was awakened. Electrodes were removed. The patient was discharged after answering the Post Questionnaire. Patient stated that he was alert and ok to drive. Equipment and room cleaned per infection control policy.

## 2022-10-05 ENCOUNTER — TELEPHONE (OUTPATIENT)
Dept: SLEEP MEDICINE | Age: 77
End: 2022-10-05

## 2022-10-05 DIAGNOSIS — G47.33 OBSTRUCTIVE SLEEP APNEA (ADULT) (PEDIATRIC): Primary | ICD-10-CM

## 2022-10-08 NOTE — PROCEDURES
[address]  PULMONARY FUNCTION TEST    Name:  Kiersten Freeman"  MR#:  [id number]  :  1945  ACCOUNT #:  [de-identified]  DATE OF SERVICE:  2022    REQUESTING PHYSICIAN:  Not available. DIAGNOSIS:  Shortness of breath. ATS criteria for reproducibility and acceptability was met. SPIROMETRY:  FEV1/FVC ratio is 72, which is normal.  FEV1 is 3.93 liters, which is 131% of predicted, which is normal.  FVC is 5.44 liters, which is 135% of predicted, which is normal.    Flow volume loop is normal.    INTERPRETATION:  Normal spirometry. Clinical correlation advised.       Kyle Severs, MD      [id number]  D:  10/07/2022 12:35  T:  10/07/2022 21:12  JOB #:  [id number]

## 2022-10-09 NOTE — TELEPHONE ENCOUNTER
Results of sleep study in [employer]  Lead tech to convey results to patient (I am away from office this week. Kindly facilitate PAP set up). Patient anxious to begin therapy. PAP titration was performed to optimize airflow settings to control his apnea/respiratory events. It was found that a setting of 9 cmH20 adequately controlled his respiratory events. Oxygen saturation was maintained at or above 90% at this setting. He appeared somewhat restless in the lab. As discussed, I will order a PAP device for his home use. It will start a little lower than the final pressure setting in the lab ,for his comfort,and will adjust up during the night. he should be seen in the sleep center after he has been on PAP for 4-6 weeks. We will assess how he is doing on PAP therapy and make any further adjustments (if needed). Patient should call the office the day he gets set up with new PAP device so we can schedule him for an adherence/compliance visit within 31-90 days of obtaining a new device. PAP order attached.   Staff to kindly order PAP and call patient and let them know which [employer] company they should be hearing from.    (patient will need a first adherence visit after 4-6 weeks on therapy)

## 2022-10-10 ENCOUNTER — PATIENT MESSAGE (OUTPATIENT)
Dept: SLEEP MEDICINE | Age: 77
End: 2022-10-10

## 2022-10-10 ENCOUNTER — DOCUMENTATION ONLY (OUTPATIENT)
Dept: SLEEP MEDICINE | Age: 77
End: 2022-10-10

## 2022-10-10 NOTE — TELEPHONE ENCOUNTER
Results sent via [employer] Fax DME order & Schedule 1st adherence visit in 60 to 90 days. Please send a [employer] message with DME information.

## 2022-10-12 ENCOUNTER — DOCUMENTATION ONLY (OUTPATIENT)
Dept: SLEEP MEDICINE | Age: 77
End: 2022-10-12

## 2022-10-17 NOTE — TELEPHONE ENCOUNTER
Patient informed of DME - [employer] ((faxed order for supplies and new device)) also emailed to [employer]

## 2022-10-18 ENCOUNTER — OFFICE VISIT (OUTPATIENT)
Dept: NEUROLOGY | Age: 77
End: 2022-10-18
Payer: MEDICARE

## 2022-10-18 VITALS
WEIGHT: 190 LBS | DIASTOLIC BLOOD PRESSURE: 68 MMHG | SYSTOLIC BLOOD PRESSURE: 132 MMHG | HEIGHT: 71 IN | BODY MASS INDEX: 26.6 KG/M2 | HEART RATE: 71 BPM | OXYGEN SATURATION: 93 %

## 2022-10-18 DIAGNOSIS — R20.2 PARESTHESIA OF BOTH HANDS: Primary | ICD-10-CM

## 2022-10-18 DIAGNOSIS — R13.10 DYSPHAGIA, UNSPECIFIED TYPE: ICD-10-CM

## 2022-10-18 PROCEDURE — G8510 SCR DEP NEG, NO PLAN REQD: HCPCS | Performed by: PSYCHIATRY & NEUROLOGY

## 2022-10-18 PROCEDURE — 1123F ACP DISCUSS/DSCN MKR DOCD: CPT | Performed by: PSYCHIATRY & NEUROLOGY

## 2022-10-18 PROCEDURE — G8417 CALC BMI ABV UP PARAM F/U: HCPCS | Performed by: PSYCHIATRY & NEUROLOGY

## 2022-10-18 PROCEDURE — G8536 NO DOC ELDER MAL SCRN: HCPCS | Performed by: PSYCHIATRY & NEUROLOGY

## 2022-10-18 PROCEDURE — 1101F PT FALLS ASSESS-DOCD LE1/YR: CPT | Performed by: PSYCHIATRY & NEUROLOGY

## 2022-10-18 PROCEDURE — 3017F COLORECTAL CA SCREEN DOC REV: CPT | Performed by: PSYCHIATRY & NEUROLOGY

## 2022-10-18 PROCEDURE — G8427 DOCREV CUR MEDS BY ELIG CLIN: HCPCS | Performed by: PSYCHIATRY & NEUROLOGY

## 2022-10-18 PROCEDURE — 99205 OFFICE O/P NEW HI 60 MIN: CPT | Performed by: PSYCHIATRY & NEUROLOGY

## 2022-10-18 RX ORDER — FAMOTIDINE 40 MG/1
20 TABLET, FILM COATED ORAL DAILY
COMMUNITY

## 2022-10-18 NOTE — PROGRESS NOTES
NEUROLOGY  NEW PATIENT EVALUATION/CONSULTATION       PATIENT NAME: Segundo Brady    MRN: [id number]    REASON FOR CONSULTATION: Difficulty swallowing    10/18/22    Previous records (physician notes, laboratory reports, and radiology reports) and imaging studies were reviewed and summarized. My recommendations will be communicated back to the patient's physician(s) via electronic medical record and/or by [address] mail. HISTORY OF PRESENT ILLNESS:  Segundo Brady is a 78 y.o.  male referred for evaluation of difficulty swallowing, hoarseness. He reports onset of symptoms a few months ago occurring periodically. He has difficulty swallowing solids. This seems to have improved since mentioning symptoms to his PCP. He denies associated dyspnea, generalized weakness, diplopia, ptosis. Barium swallow was recently performed with reported mid/distal esophageal mucosal irregularity, possible esophagitis along with spontaneous gastroesophageal reflux. He is scheduled to have an endoscopy soon for further evaluation of dysphagia. He also reports rather severe fatigue with recent diagnosis of severe sleep apnea. He is followed by sleep medicine for above. He has not started PAP therapy. He also mentions intermittent tingling into both arms from his shoulders to all fingers x 3 weeks. This occurred after some heavy gardening resulting in pain into both shoulders, acute on chronic for several years. No apparent upper extremity weakness or cervicalgia. Denies similar paresthesias of the lower extremities.      PAST MEDICAL HISTORY:  Past Medical History:   Diagnosis Date    Cholelithiasis 10/26/2011    s/p surgery    GERD (gastroesophageal reflux disease)     Painful ejaculation 3/24/2015    improved, no obvious etiology    Pure hypercholesterolemia 7/5/2016    Seminoma of right testis, stage 1 ([facility] [address] 1984    (testicular cancer)    Tinnitus of left ear     attributed to anesthesia after orchiectomy surgery Unspecified adverse effect of anesthesia     TINNITUS-GOT TWICE THE USUAL DOSE FOR ORCHIECTOMY       PAST SURGICAL HISTORY:  Past Surgical History:   Procedure Laterality Date    COLONOSCOPY N/A 2/26/2020    no polyps - performed by Floyd Stratton MD at [facility] ENDOSCOPY    HX COLONOSCOPY  8/27/03    hyperplastic polyp    HX COLONOSCOPY  3/11/09    normal    HX HAIR TRANSPLANT  11/2019    HX HEENT  11/2004    Left stapedectomy    HX HERNIA REPAIR Bilateral 2017    inguinal hernia repair    HX LAP CHOLECYSTECTOMY  2011    HX ORCHIECTOMY Right 1984    h/o seminoma    HX ORTHOPAEDIC Left 2000    bicept tendon repair    HX ORTHOPAEDIC Right 2005    bicept tendon repair    HX OTHER SURGICAL Bilateral 04/2019    Tenotomy (surgery on 5th toe, tendon severed due to curvature)       FAMILY HISTORY:   Family History   Problem Relation Age of Onset    OSTEOARTHRITIS Mother     Other Mother         CONFUSION AFTER HIP SURG AT AGE 80    Heart Attack Father     Breast Cancer Sister     Hearing Impairment Brother     Atrial Fibrillation Sister     Hearing Impairment Sister          SOCIAL HISTORY:  Social History     Socioeconomic History    Marital status: SINGLE   Tobacco Use    Smoking status: Never    Smokeless tobacco: Never   Vaping Use    Vaping Use: Never used   Substance and Sexual Activity    Alcohol use: Yes     Comment: rarely    Drug use: No    Sexual activity: Yes     Partners: Male     Birth control/protection: Condom         MEDICATIONS:   Current Outpatient Medications   Medication Sig Dispense Refill    diclofenac (VOLTAREN) 1 % gel Apply  to affected area. finasteride (PROSCAR) 5 mg tablet Take 0.5 Tablets by mouth daily. 90 Tablet 1    econazole nitrate (SPECTAZOLE) 1 % topical cream Apply to effected as needed twice a day. 15 g 0    fluocinoNIDE (LIDEX) 0.05 % ointment Apply  to affected area two (2) times a day.  (Patient not taking: Reported on 7/28/2022) 15 g 0         ALLERGIES:  Allergies   Allergen Reactions    Lamisil [Terbinafine] Other (comments)     STOMACH ACHE    Other Medication Other (comments)     Does not want to take narcotics, hypersensitive         REVIEW OF SYSTEMS:  10 point ROS reviewed with patient. Please see scanned document under media.   +fatigue/MIKE  +chronic non-pulsatile L tinnitus x 35 years as reaction of anesthesia as well as decreased hearing per patient-has upcoming appt with ENT    PHYSICAL EXAM:  Vital Signs: Visit Vitals  /68   Pulse 71   Ht 5' 11\" (1.803 m)   Wt 190 lb (86.2 kg)   SpO2 93%   BMI 26.50 kg/m²        General Medical Exam:  General:  Well appearing, comfortable, in no apparent distress. Eyes/ENT: see cranial nerve examination. Neck: No masses appreciated. Full range of motion without tenderness. Respiratory:  Clear to auscultation, good air entry bilaterally. Cardiac:  Regular rate and rhythm, no murmur. GI:  Soft, non-tender, non-distended abdomen. Bowel sounds normal. No masses, organomegaly. Extremities:  No deformities, edema, or skin discoloration. Skin:  No rashes or lesions. Neurological:  Mental Status:  Alert and oriented to person, place, and time with fluent speech. Cranial Nerves:   CNII/III/IV/VI: visual fields full to confrontation, EOMI, PERRL, no ptosis or nystagmus. CN V: Facial sensation intact bilaterally, masseter 5/5   CN VII: Facial muscles symmetric and strong   CN VIII: Hears finger rub well bilaterally, intact vestibular function   CN IX/X: Normal palatal movement   CN XI: Full strength shoulder shrug bilaterally   CN XII: Tongue protrusion full and midline without fasciculation or atrophy  Motor: Normal tone and muscle bulk with no pronator drift.    Individual muscle group testing:  Shoulder abduction:   Left:5/5   Right : 5/5    Shoulder adduction:   Left:5/5   Right : 5/5    Elbow flexion:      Left:5/5   Right : 5/5  Elbow extension:    Left:5/5   Right : 5/5   Wrist flexion:    Left:5/5   Right : 5/5  Wrist extension:    Left:5/5   Right : 5/5  Arm pronation:   Left:5/5   Right : 5/5  Arm supination:   Left:5/5   Right : 5/5    Finger flexion:    Left:5/5   Right : 5/5    Finger extension:   Left:5/5   Right : 5/5   Finger abduction:  Left:5/5   Right : 5/5   Finger adduction:   Left:5/5   Right : 5/5  Hip flexion:     Left:5/5   Right : 5/5         Hip extension:   Left:5/5   Right : 5/5    Knee flexion:    Left:5/5   Right : 5/5    Knee extension:   Left:5/5   Right : 5/5    Dorsiflexion:     Left:5/5   Right : 5/5  Plantar flexion:    Left:5/5   Right : 5/5      MSRs: No crossed adductors or clonus. RIGHT  LEFT   Brachioradialis 2+ 2+   Biceps 2+ 2+   Triceps 2+ 2+   Knee 2+ 2+   Achilles 2+ 2+        Plantar response Downward Downward          Sensation: Normal and symmetric perception of pinprick, temperature, light touch, proprioception, and vibration; (-) Romberg. Coordination: No dysmetria. Normal rapid alternating movements; finger-to-nose and heel-to- shin testing are within normal limits. Gait: Normal native gait. ASSESSMENT:      ICD-10-CM ICD-9-CM    1. Paresthesia of both hands  R20.2 782.0 CK      TSH 3RD GENERATION      EMG LIMITED      2. Dysphagia, unspecified type  R13.10 787.20 CK      TSH 3RD GENERATION      ACETYLCHOLINE RECEPTOR PANEL      EMG LIMITED      78year old male referred for evaluation of intermittent dysphagia and hoarseness x several months with attenuation since onset without accompanying dyspnea, generalized weakness, ptosis or diplopia. Barium swallow was recently performed with reported mid/distal esophageal mucosal irregularity, possible esophagitis along with spontaneous gastroesophageal reflux. He is scheduled to have an endoscopy soon for further evaluation of dysphagia. Discussed lower suspicion for neuromuscular disorder contributing to his presentation.  For completeness, we will proceed with AchR antibody testing as well as repetitive nerve stimulation to exclude NMJ disorder. He also mentions more recent intermittent paresthesias of the b/l upper extremities with associated acute on chronic shoulder arthralgias following manual labor 3 weeks ago. We discussed utility of electrodiagnostic testing in excluding mononeuropathy versus cervical radiculopathy contributing to above presentation. He elects to proceed with testing. Regarding his chronic fatigue, this is likely related to untreated severe sleep apnea and anticipate improvement once PAP therapy is initiated. We also discussed ENT follow up for chronic non-pulsatile tinnitus and hearing loss as previously arranged. PLAN:  CK, TSH, AchR ab  EMG UE b/l, include repetitive nerve stimulation- will discuss results after testing is completed  Advise CPAP for treatment of severe MIKE likely contributing to fatigue  ENT follow up for chronic tinnitus      Follow-up and Dispositions    Return if symptoms worsen or fail to improve. I have discussed the diagnosis with the patient today and the intended plan as seen in the above orders with both the patient as well as referring provider and/or PCP via electronic correspondence. The patient has received an after-visit summary and questions were answered concerning future plans. I have discussed medication side effects and warnings with the patient as well. The duration of this appointment visit was 60 minutes spent on interview, examination, review of records, counseling, explanation of diagnosis, planning of further management, documentation and coordination of care. Savannah Garvey DO  Staff Neurologist  Diplomate, [address] [place] [address] Board of Psychiatry & Neurology       CC Referring provider:  Lawrence Moss MD

## 2022-10-20 LAB
ACHR BIND AB SER-SCNC: <0.03 NMOL/L (ref 0–0.24)
ACHR BLOCK AB SER-ACNC: 12 % (ref 0–25)
ACHR MOD AB/ACHR TOTAL SFR SER: NORMAL %
CK SERPL-CCNC: 94 U/L (ref 41–331)
TSH SERPL DL<=0.005 MIU/L-ACNC: 1.6 UIU/ML (ref 0.45–4.5)

## 2022-10-28 ENCOUNTER — TELEPHONE (OUTPATIENT)
Dept: NEUROLOGY | Age: 77
End: 2022-10-28

## 2022-10-28 NOTE — TELEPHONE ENCOUNTER
Called and had to leave a message per Dr. Swati Crowe:  [facility] reviewed without significant abnormalities

## 2022-11-09 ENCOUNTER — OFFICE VISIT (OUTPATIENT)
Dept: NEUROLOGY | Age: 77
End: 2022-11-09
Payer: MEDICARE

## 2022-11-09 DIAGNOSIS — G56.20 ULNAR NEUROPATHY AT ELBOW, UNSPECIFIED LATERALITY: Primary | ICD-10-CM

## 2022-11-09 DIAGNOSIS — R13.10 DYSPHAGIA, UNSPECIFIED TYPE: ICD-10-CM

## 2022-11-09 PROCEDURE — 95911 NRV CNDJ TEST 9-10 STUDIES: CPT | Performed by: PSYCHIATRY & NEUROLOGY

## 2022-11-09 PROCEDURE — 95937 NEUROMUSCULAR JUNCTION TEST: CPT | Performed by: PSYCHIATRY & NEUROLOGY

## 2022-11-09 PROCEDURE — 95886 MUSC TEST DONE W/N TEST COMP: CPT | Performed by: PSYCHIATRY & NEUROLOGY

## 2022-11-09 NOTE — PROGRESS NOTES
[address]  [address], [address] [facility] [address]  Phone [phone] Fax [phone]  Test Date:  2022    Patient: Sharon Dotson : 1945 Physician: Eli Butterfield. Alfonso Rock DO   Sex: Male Height: 5' 11\" Ref Phys: Eli Butterfield. Alfonso Rock DO   ID#: [id number]  Weight: 190 lbs. Technician: Felicita Reeves     Patient Complaints:  Bilateral upper extremity paresthesias; Dysphagia      NCV & EMG Findings:  Evaluation of the left ulnar motor and the right ulnar motor nerves showed decreased conduction velocity (A Elbow-B Elbow, L40, R32 m/s). The right ulnar sensory nerve showed reduced amplitude (9.0 µV). All remaining nerves  were within normal limits. All F Wave latencies were within normal limits. All examined muscles (as indicated in the following table) showed no evidence of electrical instability. 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 2.   No evidence of a left cervical motor radiculopathy. 3.   No evidence of a generalized myopathy or disorder of neuromuscular junction transmission, as could be seen in myasthenia gravis or Lambert-Eaton myasthenic syndrome. Specifically, there are no myopathic appearing motor units, motor unit instability or myotonic discharges noted during this study. In addition, repetitive nerve stimulation does not reveal evidence of reproducible decrement.      ___________________________  Eli Butterfield.  Alfonso Rock, DO        Nerve Conduction Studies  Anti Sensory Summary Table     Stim Site NR Peak (ms) Norm Peak (ms) P-T Amp (µV) Norm P-T Amp Onset (ms) Site1 Site2 Delta-P (ms) Dist (cm) Luis (m/s) Norm Luis (m/s)   Left Median Anti Sensory (2nd Digit)  32.3°C   Wrist    3.5 <3.6 16.6 >10 2.8 Wrist 2nd Digit 3.5 14.0 40 >39   Right Median Anti Sensory (2nd Digit)  32.3°C   Wrist    3.5 <3.6 16.5 >10 2.8 Wrist 2nd Digit 3.5 14.0 40 >39   Left Radial Anti Sensory (Base 1st Digit)  32.3°C   Wrist    2.6 <3.1 32.1  2.1 Wrist Base 1st Digit 2.6 0.0     Right Radial Anti Sensory (Base 1st Digit)  32.6°C   Wrist    2.1 <3.1 36.8  1.6 Wrist Base 1st Digit 2.1 0.0     Left Ulnar Anti Sensory (5th Digit)  32.3°C   Wrist    3.2 <3.7 17.5 >15.0 2.6 Wrist 5th Digit 3.2 14.0 44 >38   Right Ulnar Anti Sensory (5th Digit)  32.5°C   Wrist    3.7 <3.7 9.0 >15.0 2.9 Wrist 5th Digit 3.7 14.0 38 >38     Motor Summary Table     Stim Site NR Onset (ms) Norm Onset (ms) O-P Amp (mV) Norm O-P Amp Site1 Site2 Delta-0 (ms) Dist (cm) Luis (m/s) Norm Luis (m/s)   Left Median Motor (Abd Poll Brev)  32.3°C   Wrist    3.5 <4.2 6.1 >5 Elbow Wrist 4.9 27.0 55 >50   Elbow    8.4  5.2          Right Median Motor (Abd Poll Brev)  32.6°C   Wrist    3.4 <4.2 7.4 >5 Elbow Wrist 4.9 27.0 55 >50   Elbow    8.3  7.3          Left Ulnar Motor (Abd Dig Minimi)  32.9°C   Wrist    3.8 <4.2 7.8 >3 B Elbow Wrist 3.1 18.0 58 >53   B Elbow    6.9  7.3  A Elbow B Elbow 2.5 10.0 40 >53   A Elbow    9.4  7.2          Right Ulnar Motor (Abd Dig Minimi)  33.4°C   Wrist    3.1 <4.2 8.1 >3 B Elbow Wrist 2.6 18.0 69 >53   B Elbow    5.7  7.5  A Elbow B Elbow 3.1 10.0 32 >53   A Elbow    8.8  7.3            Comparison Summary Table     Stim Site NR Peak (ms) Norm Peak (ms) P-T Amp (µV) Site1 Site2 Delta-P (ms) Norm Delta (ms)   Left Median/Ulnar Palm Comparison (Wrist - 8cm)  32.2°C   Median Palm    2.1 <2.5 9.2 Median Palm Ulnar Palm 0.1 <0.3   Ulnar Palm    2.0 <2.5 3.2       Right Median/Ulnar Palm Comparison (Wrist - 8cm)  32.9°C   Median Palm    2.1 <2.5 23.6 Median Palm Ulnar Palm 0.1 <0.3   Ulnar Palm    2.0 <2.5 14.6         F Wave Studies     NR F-Lat (ms) Lat Norm (ms) L-R F-Lat (ms) L-R Lat Norm   Left Ulnar (Mrkrs) (Abd Dig Min)  33°C      30.66 <36  <2.5     EMG     Side Muscle Nerve Root Ins Act Fibs Psw Amp Dur Poly Recrt Int Ingris Fraise Comment   Left 1stDorInt Ulnar C8-T1 Nml Nml Nml Nml Nml 0 Nml Nml    Left FlexPolLong Median (Ant Int) C7-8 Nml Nml Nml Nml Nml 0 Nml Nml    Left Ext Indicis Radial (Post Int) C7-8 Nml Nml Nml Nml Nml 0 Nml Nml    Left Biceps Musculocut C5-6 Nml Nml Nml Nml Nml 0 Nml Nml    Left Triceps Radial C6-7-8 Nml Nml Nml Nml Nml 0 Nml Nml    Left Deltoid Axillary C5-6 Nml Nml Nml Nml Nml 0 Nml Nml      RNS     Trial # Label Amp 1 (mV)  O-P Amp 5 (mV)  O-P Amp % Dif Area 1 (mV·ms) Area 5 (mV·ms) Area % Dif Rep Rate Train Length Pause Time (min:sec) Comments   Left Abd Poll Brev   Tr 1 Baseline 8.53 8.23 -3.5 28.70 26.18 -8.8 3.00 10 00:30    Tr 2 Post Exercise 8.98 8.90 -0.9 28.87 27.30 -5.4 3.00 10 01:00    Tr 3 1 min Post 9.57 9.37 -2.0 30.89 28.38 -8.1 3.00 10 01:00    Tr 6 2 min Post 7.70 7.12 -7.6 24.20 21.55 -11.0 3.00 10 01:00    Tr 7 3 min Post 7.78 7.23 -7.0 25.04 21.98 -12.2 3.00 10 00:00              Waveforms:

## 2022-11-10 ENCOUNTER — TRANSCRIBE ORDER (OUTPATIENT)
Dept: SCHEDULING | Age: 77
End: 2022-11-10

## 2022-11-10 DIAGNOSIS — H90.42 SENSORINEURAL HEARING LOSS, UNILATERAL, LEFT EAR, WITH UNRESTRICTED HEARING ON THE CONTRALATERAL SIDE: Primary | ICD-10-CM

## 2022-11-30 ENCOUNTER — OFFICE VISIT (OUTPATIENT)
Dept: SLEEP MEDICINE | Age: 77
End: 2022-11-30
Payer: MEDICARE

## 2022-11-30 VITALS
DIASTOLIC BLOOD PRESSURE: 65 MMHG | HEART RATE: 75 BPM | HEIGHT: 71 IN | OXYGEN SATURATION: 95 % | TEMPERATURE: 98.1 F | SYSTOLIC BLOOD PRESSURE: 113 MMHG | WEIGHT: 192.2 LBS | BODY MASS INDEX: 26.91 KG/M2

## 2022-11-30 DIAGNOSIS — G47.39 MIXED SLEEP APNEA: Primary | ICD-10-CM

## 2022-11-30 PROCEDURE — 99213 OFFICE O/P EST LOW 20 MIN: CPT | Performed by: INTERNAL MEDICINE

## 2022-11-30 PROCEDURE — G8417 CALC BMI ABV UP PARAM F/U: HCPCS | Performed by: INTERNAL MEDICINE

## 2022-11-30 PROCEDURE — G8427 DOCREV CUR MEDS BY ELIG CLIN: HCPCS | Performed by: INTERNAL MEDICINE

## 2022-11-30 PROCEDURE — 3017F COLORECTAL CA SCREEN DOC REV: CPT | Performed by: INTERNAL MEDICINE

## 2022-11-30 PROCEDURE — 1123F ACP DISCUSS/DSCN MKR DOCD: CPT | Performed by: INTERNAL MEDICINE

## 2022-11-30 PROCEDURE — G8536 NO DOC ELDER MAL SCRN: HCPCS | Performed by: INTERNAL MEDICINE

## 2022-11-30 PROCEDURE — 1101F PT FALLS ASSESS-DOCD LE1/YR: CPT | Performed by: INTERNAL MEDICINE

## 2022-11-30 PROCEDURE — G8432 DEP SCR NOT DOC, RNG: HCPCS | Performed by: INTERNAL MEDICINE

## 2022-11-30 NOTE — PROGRESS NOTES
[address] [place]. [place], [address]  Tel.  [phone]  Fax. [address] [facility] [address]  [place], [address] [place]  Tel.  [phone]  Fax. [phone] [address] [facility] Pramod Hernandez   Tel.  [phone]  Fax. [phone]     S>Hugo Vu is a 78 y.o. male seen for a positive airway pressure follow-up. He reports no problems using the device. The following problems are identified:    Drowsiness no Problems exhaling No, but does have slight stomach fullness in morning   Snoring no Forget to put on no   Mask Comfortable yes Can't fall asleep no   Dry Mouth yes Mask falls off no   Air Leaking yes Frequent awakenings yes     Download reviewed. He has used his machine every night. He has had it for just under 3 weeks    He admits that his sleep has improved. Therapy Apnea Index averaged over PAP use: 11 /hr which reflects improved sleep breathing condition. Allergies   Allergen Reactions    Lamisil [Terbinafine] Other (comments)     STOMACH ACHE    Other Medication Other (comments)     Does not want to take narcotics, hypersensitive       He has a current medication list which includes the following prescription(s): multivitamin/iron/folic acid, famotidine, diclofenac, finasteride, econazole nitrate, and fluocinonide. .      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 [place] Sleepiness Score: (P) 11   and Modified F.O.S.Q. Score Total / 2: (P) 16   which reflect improved sleep quality over therapy time.     O>    Visit Vitals  /65   Pulse 75   Temp 98.1 °F (36.7 °C)   Ht 5' 11\" (1.803 m)   Wt 192 lb 3.2 oz (87.2 kg)   SpO2 95%   BMI 26.81 kg/m²           General:   Alert, oriented, not in distress   Neck:   No JVD    Chest/Lungs:  symetrical lung expansion , no accessory muscle use    Extremities:  no obvious rashes , negative edema    Neuro:  No focal deficits ; No obvious tremor    Psych:  Normal affect ,  Normal countenance ;           A>    ICD-10-CM ICD-9-CM    1. Mixed sleep apnea  G47.39 327.29         AHI = 42(2022). On CPAP, Resmed :  7-9 cmH2O. Compliant:      yes    Therapeutic Response:  Positive    P>    he is compliant with PAP therapy and PAP continues to benefit patient and remains necessary for control of his sleep apnea. We discussed the download and expectations of treatment. I have advised him to obtain a bed wedge and avoid using the airplane style neck pillow which is causing him neck pain in the morning. *   Follow-up and Dispositions    Return in about 3 weeks (around 12/21/2022). he will continue to use it nightly  He can contact his [employer] company if he wants to try a different mask. Humidification addressed  * He was asked to contact our office for any problems regarding PAP therapy. * Counseling was provided regarding the importance of regular PAP use and on proper sleep hygiene and safe driving. * Re-enforced proper and regular cleaning for the device. All of his questions addressed. I will see him for the first adherence.   Electronically signed by    Agustín Valdivia MD  Diplomate in Sleep Medicine  [facility]  11/30/2022

## 2022-11-30 NOTE — PATIENT INSTRUCTIONS
[address] [place]. [place], [address]  Tel.  [phone]  Fax. [address] [facility] [address]  [place], [address] [facility]  Tel.  [phone]  Fax. [phone] [address] [facility] [address]  Tel.  [phone]  Fax. [phone]     PROPER SLEEP HYGIENE    What to avoid  Do not have drinks with caffeine, such as coffee or black tea, for 8 hours before bed. Do not smoke or use other types of tobacco near bedtime. Nicotine is a stimulant and can keep you awake. Avoid drinking alcohol late in the evening, because it can cause you to wake in the middle of the night. Do not eat a big meal close to bedtime. If you are hungry, eat a light snack. Do not drink a lot of water close to bedtime, because the need to urinate may wake you up during the night. Do not read or watch TV in bed. Use the bed only for sleeping and sexual activity. What to try  Go to bed at the same time every night, and wake up at the same time every morning. Do not take naps during the day. Keep your bedroom quiet, dark, and cool. Get regular exercise, but not within 3 to 4 hours of your bedtime. .  Sleep on a comfortable pillow and mattress. If watching the clock makes you anxious, turn it facing away from you so you cannot see the time. If you worry when you lie down, start a worry book. Well before bedtime, write down your worries, and then set the book and your concerns aside. Try meditation or other relaxation techniques before you go to bed. If you cannot fall asleep, get up and go to another room until you feel sleepy. Do something relaxing. Repeat your bedtime routine before you go to bed again. Make your house quiet and calm about an hour before bedtime. Turn down the lights, turn off the TV, log off the computer, and turn down the volume on music. This can help you relax after a busy day.     Drowsy Driving  The [address] [place] [address] Road Traffic Safety Administration cites drowsiness as a causing factor in more than 257,982 police reported crashes annually, resulting in 76,000 injuries and 1,500 deaths. Other surveys suggest 55% of people polled have driven while drowsy in the past year, 23% had fallen asleep but not crashed, 3% crashed, and 2% had and accident due to drowsy driving. Who is at risk? Young Drivers: One study of drowsy driving accidents states that 55% of the drivers were under 25 years. Of those, 75% were male. Shift Workers and Travelers: People who work overnight or travel across time zones frequently are at higher risk of experiencing Circadian Rhythm Disorders. They are trying to work and function when their body is programed to sleep. Sleep Deprived: Lack of sleep has a serious impact on your ability to pay attention or focus on a task. Consistently getting less than the average of 8 hours your body needs creates partial or cumulative sleep deprivation. Untreated Sleep Disorders: Sleep Apnea, Narcolepsy, R.L.S., and other sleep disorders (untreated) prevent a person from getting enough restful sleep. This leads to excessive daytime sleepiness and increases the risk for drowsy driving accidents by up to 7 times. Medications / Alcohol: Even over the counter medications can cause drowsiness. Medications that impair a drivers attention should have a warning label. Alcohol naturally makes you sleepy and on its own can cause accidents. Combined with excessive drowsiness its effects are amplified. Signs of Drowsy Driving:   * You don't remember driving the last few miles   * You may drift out of your addie   * You are unable to focus and your thoughts wander   * You may yawn more often than normal   * You have difficulty keeping your eyes open / nodding off   * Missing traffic signs, speeding, or tailgating  Prevention-   Good sleep hygiene, lifestyle and behavioral choices have the most impact on drowsy driving.  There is no substitute for sleep and the average person requires 8 hours nightly. If you find yourself driving drowsy, stop and sleep. Consider the sleep hygiene tips provided during your visit as well. Medication Refill Policy: Refills for all medications require 1 week advance notice. Please have your pharmacy fax a refill request. We are unable to fax, or call in \"controled substance\" medications and you will need to pick these prescriptions up from our office. [employer] Activation    Thank you for requesting access to [employer]. Please follow the instructions below to securely access and download your online medical record. [employer] allows you to send messages to your doctor, view your test results, renew your prescriptions, schedule appointments, and more. How Do I Sign Up? In your internet browser, go to [email or web address]. Click on the First Time User? Click Here link in the Sign In box. You will see the New Member Sign Up page. Enter your [employer] Access Code exactly as it appears below. You will not need to use this code after youve completed the sign-up process. If you do not sign up before the expiration date, you must request a new code. [employer] Access Code: Activation code not generated  Current [employer] Status: Active (This is the date your [employer] access code will )    Enter the last four digits of your Social Security Number (xxxx) and Date of Birth (mm/dd/yyyy) as indicated and click Submit. You will be taken to the next sign-up page. Create a [employer] ID. This will be your [employer] login ID and cannot be changed, so think of one that is secure and easy to remember. Create a [employer] password. You can change your password at any time. Enter your Password Reset Question and Answer. This can be used at a later time if you forget your password. Enter your e-mail address. You will receive e-mail notification when new information is available in [address] Click Sign Up.  You can now view and download portions of your medical record. Click the [employer] [place] link to download a portable copy of your medical information. Additional Information    If you have questions, please call [phone]. Remember, "[employer] is NOT to be used for urgent needs. For medical emergencies, dial 911.

## 2022-12-02 ENCOUNTER — HOSPITAL ENCOUNTER (OUTPATIENT)
Dept: MRI IMAGING | Age: 77
End: 2022-12-02
Attending: OTOLARYNGOLOGY
Payer: MEDICARE

## 2022-12-02 VITALS — WEIGHT: 190 LBS | BODY MASS INDEX: 26.5 KG/M2

## 2022-12-02 DIAGNOSIS — H90.42 SENSORINEURAL HEARING LOSS, UNILATERAL, LEFT EAR, WITH UNRESTRICTED HEARING ON THE CONTRALATERAL SIDE: ICD-10-CM

## 2022-12-02 PROCEDURE — A9576 INJ PROHANCE MULTIPACK: HCPCS

## 2022-12-02 PROCEDURE — 74011250636 HC RX REV CODE- 250/636

## 2022-12-02 PROCEDURE — 70553 MRI BRAIN STEM W/O & W/DYE: CPT

## 2022-12-02 RX ADMIN — GADOTERIDOL 18 ML: 279.3 INJECTION, SOLUTION INTRAVENOUS at 09:44

## 2022-12-05 DIAGNOSIS — Z86.73 HISTORY OF ISCHEMIC STROKE: Primary | ICD-10-CM

## 2022-12-06 ENCOUNTER — PATIENT MESSAGE (OUTPATIENT)
Dept: INTERNAL MEDICINE CLINIC | Age: 77
End: 2022-12-06

## 2022-12-13 ENCOUNTER — HOSPITAL ENCOUNTER (OUTPATIENT)
Dept: VASCULAR SURGERY | Age: 77
Discharge: HOME | End: 2022-12-13
Attending: INTERNAL MEDICINE
Payer: MEDICARE

## 2022-12-13 DIAGNOSIS — Z86.73 HISTORY OF ISCHEMIC STROKE: ICD-10-CM

## 2022-12-13 LAB
LEFT CCA DIST DIAS: 14.6 CM/S
LEFT CCA DIST SYS: 87.1 CM/S
LEFT CCA PROX DIAS: 15.7 CM/S
LEFT CCA PROX SYS: 109.1 CM/S
LEFT ECA DIAS: 9.1 CM/S
LEFT ECA SYS: 98.1 CM/S
LEFT ICA DIST DIAS: 26.7 CM/S
LEFT ICA DIST SYS: 86 CM/S
LEFT ICA MID DIAS: 21.2 CM/S
LEFT ICA MID SYS: 79.5 CM/S
LEFT ICA PROX DIAS: 14.6 CM/S
LEFT ICA PROX SYS: 67.4 CM/S
LEFT ICA/CCA SYS: 1 NO UNITS
LEFT VERTEBRAL DIAS: 12.5 CM/S
LEFT VERTEBRAL SYS: 51.7 CM/S
RIGHT CCA DIST DIAS: 16.4 CM/S
RIGHT CCA DIST SYS: 84.2 CM/S
RIGHT CCA PROX DIAS: 17.7 CM/S
RIGHT CCA PROX SYS: 98.6 CM/S
RIGHT ECA DIAS: 13.8 CM/S
RIGHT ECA SYS: 98.5 CM/S
RIGHT ICA DIST DIAS: 24.2 CM/S
RIGHT ICA DIST SYS: 71.2 CM/S
RIGHT ICA MID DIAS: 22.9 CM/S
RIGHT ICA MID SYS: 81.6 CM/S
RIGHT ICA PROX DIAS: 17.7 CM/S
RIGHT ICA PROX SYS: 72.5 CM/S
RIGHT ICA/CCA SYS: 1 NO UNITS
RIGHT VERTEBRAL DIAS: 10.1 CM/S
RIGHT VERTEBRAL SYS: 44.1 CM/S

## 2022-12-13 PROCEDURE — 93880 EXTRACRANIAL BILAT STUDY: CPT

## 2022-12-22 ENCOUNTER — OFFICE VISIT (OUTPATIENT)
Dept: INTERNAL MEDICINE CLINIC | Age: 77
End: 2022-12-22
Payer: MEDICARE

## 2022-12-22 VITALS
TEMPERATURE: 97.5 F | RESPIRATION RATE: 18 BRPM | HEIGHT: 71 IN | DIASTOLIC BLOOD PRESSURE: 61 MMHG | OXYGEN SATURATION: 94 % | SYSTOLIC BLOOD PRESSURE: 122 MMHG | WEIGHT: 189 LBS | BODY MASS INDEX: 26.46 KG/M2 | HEART RATE: 80 BPM

## 2022-12-22 DIAGNOSIS — T14.8XXA PULLED MUSCLE: Primary | ICD-10-CM

## 2022-12-22 PROCEDURE — G0463 HOSPITAL OUTPT CLINIC VISIT: HCPCS | Performed by: STUDENT IN AN ORGANIZED HEALTH CARE EDUCATION/TRAINING PROGRAM

## 2022-12-22 PROCEDURE — 99213 OFFICE O/P EST LOW 20 MIN: CPT | Performed by: STUDENT IN AN ORGANIZED HEALTH CARE EDUCATION/TRAINING PROGRAM

## 2022-12-22 RX ORDER — LIDOCAINE 50 MG/G
1 PATCH TOPICAL EVERY 12 HOURS
Qty: 15 EACH | Refills: 1 | Status: SHIPPED | OUTPATIENT
Start: 2022-12-22

## 2022-12-22 NOTE — PROGRESS NOTES
Maranda Juares is a 78 y.o. male who was seen in clinic today (12/22/2022) for an acute visit. Assessment & Plan:   Below is the assessment and plan developed based on review of pertinent history, physical exam, labs, studies, and medications. 1. Pulled muscle  -     lidocaine (LIDODERM) 5 %; 1 Patch by TransDERmal route every twelve (12) hours. Apply patch to the affected area for 12 hours a day and remove for 12 hours a day., Normal, Disp-15 Each, R-1  No tachycardia, hypoxia, sx DVT to suggest PE. No sx suggestive of PNA. Exam benign. Favor intercostal muscle strain. Can use heat, tylenol PRN. Will prescribe lidocaine patch. Limit activities to avoid provoking pain to allow healing. Subjective:   Daxa Bueno was seen today for Breathing Problem (Shortness of breath right side)    Feels a \"stich\" in his side. Sharp pain under R side. It started last night when he was driving in the car. He has been traveling, sleeping in different beds every night. He used a heating pad last night, applied vicks, applied voltaren gel and didn't think any of it helped. It is worse with a deep breath and when he bends over. He did not feel shortness of breath when unloading the care but he did run up a flight of stairs on the way to the office and felt short of breath with this. No leg swelling, calf pain, fever, chills, cough, chest pain, palpitations. 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 Authorizing Provider   lidocaine (LIDODERM) 5 % 1 Patch by TransDERmal route every twelve (12) hours.  Apply patch to the affected area for 12 hours a day and remove for 12 hours a day. 12/22/22  Yes Siomara Chappell MD   multivitamin/iron/folic acid (CENTRUM PO) Take  by mouth. Yes Provider, Historical   famotidine (PEPCID) 40 mg tablet Take 20 mg by mouth daily. Yes Provider, Historical   diclofenac (VOLTAREN) 1 % gel Apply  to affected area. Yes Provider, Historical   finasteride (PROSCAR) 5 mg tablet Take 0.5 Tablets by mouth daily. 1/14/22  Yes Venkat Nelson MD   econazole nitrate (SPECTAZOLE) 1 % topical cream Apply to effected as needed twice a day. 1/14/22  Yes Venkat Nelson MD   fluocinoNIDE (LIDEX) 0.05 % ointment Apply  to affected area two (2) times a day. Patient not taking: No sig reported 1/14/22   Venkat Nelson MD       Objective:   Visit Vitals  /61   Pulse 80   Temp 97.5 °F (36.4 °C) (Temporal)   Resp 18   Ht 5' 11\" (1.803 m)   Wt 189 lb (85.7 kg)   SpO2 94%   BMI 26.36 kg/m²       Physical Exam  Cardiovascular:      Rate and Rhythm: Normal rate and regular rhythm. Heart sounds: No murmur heard. Pulmonary:      Effort: Pulmonary effort is normal. No respiratory distress. Breath sounds: No wheezing. Chest:      Chest wall: No tenderness. Musculoskeletal:      Right lower leg: No edema. Left lower leg: No edema. Comments: No calf tenderness or erythema   Neurological:      General: No focal deficit present. Mental Status: He is alert. Mental status is at baseline. Advised him to call back or return to office if symptoms worsen/change/persist.  Discussed expected course/resolution/complications of diagnosis in detail with patient.     Teresita June MD

## 2023-01-04 ENCOUNTER — OFFICE VISIT (OUTPATIENT)
Dept: SLEEP MEDICINE | Age: 78
End: 2023-01-04
Payer: MEDICARE

## 2023-01-04 ENCOUNTER — DOCUMENTATION ONLY (OUTPATIENT)
Dept: SLEEP MEDICINE | Age: 78
End: 2023-01-04

## 2023-01-04 VITALS
TEMPERATURE: 98.1 F | DIASTOLIC BLOOD PRESSURE: 62 MMHG | BODY MASS INDEX: 26.51 KG/M2 | HEART RATE: 80 BPM | OXYGEN SATURATION: 95 % | SYSTOLIC BLOOD PRESSURE: 101 MMHG | WEIGHT: 190.1 LBS

## 2023-01-04 DIAGNOSIS — G47.39 MIXED SLEEP APNEA: Primary | ICD-10-CM

## 2023-01-04 PROCEDURE — 99213 OFFICE O/P EST LOW 20 MIN: CPT | Performed by: INTERNAL MEDICINE

## 2023-01-04 PROCEDURE — G8417 CALC BMI ABV UP PARAM F/U: HCPCS | Performed by: INTERNAL MEDICINE

## 2023-01-04 PROCEDURE — G8536 NO DOC ELDER MAL SCRN: HCPCS | Performed by: INTERNAL MEDICINE

## 2023-01-04 PROCEDURE — 1101F PT FALLS ASSESS-DOCD LE1/YR: CPT | Performed by: INTERNAL MEDICINE

## 2023-01-04 PROCEDURE — G8427 DOCREV CUR MEDS BY ELIG CLIN: HCPCS | Performed by: INTERNAL MEDICINE

## 2023-01-04 PROCEDURE — G8432 DEP SCR NOT DOC, RNG: HCPCS | Performed by: INTERNAL MEDICINE

## 2023-01-04 PROCEDURE — 1123F ACP DISCUSS/DSCN MKR DOCD: CPT | Performed by: INTERNAL MEDICINE

## 2023-01-04 PROCEDURE — 3017F COLORECTAL CA SCREEN DOC REV: CPT | Performed by: INTERNAL MEDICINE

## 2023-01-04 NOTE — PATIENT INSTRUCTIONS
[address] [facility] [place]. [place], [address]  Tel.  [phone]  Fax. [address] [facility] [address]  [place], [address]  Tel.  [phone]  Fax. [phone] [address]  Tel.  [phone]  Fax. [phone]     PROPER SLEEP HYGIENE    What to avoid  Do not have drinks with caffeine, such as coffee or black tea, for 8 hours before bed. Do not smoke or use other types of tobacco near bedtime. Nicotine is a stimulant and can keep you awake. Avoid drinking alcohol late in the evening, because it can cause you to wake in the middle of the night. Do not eat a big meal close to bedtime. If you are hungry, eat a light snack. Do not drink a lot of water close to bedtime, because the need to urinate may wake you up during the night. Do not read or watch TV in bed. Use the bed only for sleeping and sexual activity. What to try  Go to bed at the same time every night, and wake up at the same time every morning. Do not take naps during the day. Keep your bedroom quiet, dark, and cool. Get regular exercise, but not within 3 to 4 hours of your bedtime. .  Sleep on a comfortable pillow and mattress. If watching the clock makes you anxious, turn it facing away from you so you cannot see the time. If you worry when you lie down, start a worry book. Well before bedtime, write down your worries, and then set the book and your concerns aside. Try meditation or other relaxation techniques before you go to bed. If you cannot fall asleep, get up and go to another room until you feel sleepy. Do something relaxing. Repeat your bedtime routine before you go to bed again. Make your house quiet and calm about an hour before bedtime. Turn down the lights, turn off the TV, log off the computer, and turn down the volume on music. This can help you relax after a busy day.     Drowsy Driving  The [address] [place] [address] Road Traffic Safety Administration cites drowsiness as a causing factor in more than 813,266 police reported crashes annually, resulting in 76,000 injuries and 1,500 deaths. Other surveys suggest 55% of people polled have driven while drowsy in the past year, 23% had fallen asleep but not crashed, 3% crashed, and 2% had and accident due to drowsy driving. Who is at risk? Young Drivers: One study of drowsy driving accidents states that 55% of the drivers were under 25 years. Of those, 75% were male. Shift Workers and Travelers: People who work overnight or travel across time zones frequently are at higher risk of experiencing Circadian Rhythm Disorders. They are trying to work and function when their body is programed to sleep. Sleep Deprived: Lack of sleep has a serious impact on your ability to pay attention or focus on a task. Consistently getting less than the average of 8 hours your body needs creates partial or cumulative sleep deprivation. Untreated Sleep Disorders: Sleep Apnea, Narcolepsy, R.L.S., and other sleep disorders (untreated) prevent a person from getting enough restful sleep. This leads to excessive daytime sleepiness and increases the risk for drowsy driving accidents by up to 7 times. Medications / Alcohol: Even over the counter medications can cause drowsiness. Medications that impair a drivers attention should have a warning label. Alcohol naturally makes you sleepy and on its own can cause accidents. Combined with excessive drowsiness its effects are amplified. Signs of Drowsy Driving:   * You don't remember driving the last few miles   * You may drift out of your addie   * You are unable to focus and your thoughts wander   * You may yawn more often than normal   * You have difficulty keeping your eyes open / nodding off   * Missing traffic signs, speeding, or tailgating  Prevention-   Good sleep hygiene, lifestyle and behavioral choices have the most impact on drowsy driving.  There is no substitute for sleep and the average person requires 8 hours nightly. If you find yourself driving drowsy, stop and sleep. Consider the sleep hygiene tips provided during your visit as well. Medication Refill Policy: Refills for all medications require 1 week advance notice. Please have your pharmacy fax a refill request. We are unable to fax, or call in \"controled substance\" medications and you will need to pick these prescriptions up from our office. [employer] Activation    Thank you for requesting access to [employer]. Please follow the instructions below to securely access and download your online medical record. [employer] allows you to send messages to your doctor, view your test results, renew your prescriptions, schedule appointments, and more. How Do I Sign Up? In your internet browser, go to [email or web address]. Click on the First Time User? Click Here link in the Sign In box. You will see the New Member Sign Up page. Enter your [employer] Access Code exactly as it appears below. You will not need to use this code after youve completed the sign-up process. If you do not sign up before the expiration date, you must request a new code. [employer] Access Code: Activation code not generated  Current [employer] Status: Active (This is the date your [employer] access code will )    Enter the last four digits of your Social Security Number (xxxx) and Date of Birth (mm/dd/yyyy) as indicated and click Submit. You will be taken to the next sign-up page. Create a [employer] ID. This will be your [employer] login ID and cannot be changed, so think of one that is secure and easy to remember. Create a [employer] password. You can change your password at any time. Enter your Password Reset Question and Answer. This can be used at a later time if you forget your password. Enter your e-mail address. You will receive e-mail notification when new information is available in [address] Click Sign Up.  You can now view and download portions of your medical record. Click the [employer] link to download a portable copy of your medical information. Additional Information    If you have questions, please call [phone]. Remember, [employer] is NOT to be used for urgent needs. For medical emergencies, dial 911.

## 2023-01-04 NOTE — PROGRESS NOTES
[address] [facility] [place]. [place], [address]  Tel.  [phone]  Fax. 100 [facility] 60  [place], [address] [facility]  Tel.  [phone]  Fax. [phone] [address]  Tel.  [phone]  Fax. [phone]     S>Hugo Fields is a 78 y.o. male seen for a positive airway pressure follow-up. He reports no problems using the device. The following problems are identified:    Drowsiness no Problems exhaling no   Snoring no Forget to put on no   Mask Comfortable yes Can't fall asleep no   Dry Mouth no Mask falls off no   Air Leaking Yes wakes him (using dreamwear full face mask)  Frequent awakenings yes     Download reviewed. He admits that his sleep has improved. Therapy Apnea Index averaged over PAP use: 5 /hr which reflects improved sleep breathing condition. Allergies   Allergen Reactions    Lamisil [Terbinafine] Other (comments)     STOMACH ACHE    Other Medication Other (comments)     Does not want to take narcotics, hypersensitive       He has a current medication list which includes the following prescription(s): lidocaine, multivitamin/iron/folic acid, famotidine, diclofenac, finasteride, econazole nitrate, and fluocinonide. .      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 Moore Sleepiness Score: 8   and Modified F.O.S.Q. Score Total / 2: 17   which reflect improved sleep quality over therapy time.     O>    Visit Vitals  /62   Pulse 80   Temp 98.1 °F (36.7 °C)   Ht (P) 5' 11\" (1.803 m)   Wt 190 lb 1.6 oz (86.2 kg)   SpO2 95%   BMI (P) 26.51 kg/m²           General:   Alert, oriented, not in distress   Neck:   No JVD    Chest/Lungs:  symetrical lung expansion , no accessory muscle use    Extremities:  no obvious rashes , negative edema    Neuro:  No focal deficits ; No obvious tremor    Psych:  Normal affect ,  Normal countenance ;           A>    ICD-10-CM ICD-9-CM    1. Mixed sleep apnea  G47.39 327.29 AMB SUPPLY ORDER        AHI = 42(2022). On CPAP, Resmed :  7-9 cmH2O. Compliant:      yes    Therapeutic Response:  Positive    P>      *   Follow-up and Dispositions    Return in about 1 year (around 1/4/2024). he is compliant with PAP therapy and PAP continues to benefit patient and remains necessary for control of his sleep apnea. he will continue on his current pressure settings. He will try a different mask style to help reduce friction on top of his head (to reduce hair thinning)    * He was asked to contact our office for any problems regarding PAP therapy. * Counseling was provided regarding the importance of regular PAP use and on proper sleep hygiene and safe driving. * Re-enforced proper and regular cleaning for the device.   Electronically signed by    Mane Oconnell MD  Diplomate in Sleep Medicine  [facility]  1/4/2023

## 2023-01-10 ENCOUNTER — OFFICE VISIT (OUTPATIENT)
Dept: INTERNAL MEDICINE CLINIC | Age: 78
End: 2023-01-10
Payer: MEDICARE

## 2023-01-10 VITALS
SYSTOLIC BLOOD PRESSURE: 129 MMHG | BODY MASS INDEX: 26.74 KG/M2 | RESPIRATION RATE: 18 BRPM | HEIGHT: 71 IN | TEMPERATURE: 97.9 F | WEIGHT: 191 LBS | OXYGEN SATURATION: 98 % | DIASTOLIC BLOOD PRESSURE: 75 MMHG | HEART RATE: 65 BPM

## 2023-01-10 DIAGNOSIS — L64.9 MALE PATTERN BALDNESS: ICD-10-CM

## 2023-01-10 DIAGNOSIS — Z86.73 HISTORY OF CEREBELLAR STROKE: ICD-10-CM

## 2023-01-10 DIAGNOSIS — Z13.6 SCREENING FOR ISCHEMIC HEART DISEASE: ICD-10-CM

## 2023-01-10 DIAGNOSIS — Z71.89 ADVANCED CARE PLANNING/COUNSELING DISCUSSION: ICD-10-CM

## 2023-01-10 DIAGNOSIS — G47.39 MIXED SLEEP APNEA: ICD-10-CM

## 2023-01-10 DIAGNOSIS — Z00.00 MEDICARE ANNUAL WELLNESS VISIT, SUBSEQUENT: Primary | ICD-10-CM

## 2023-01-10 DIAGNOSIS — K21.9 GASTROESOPHAGEAL REFLUX DISEASE WITHOUT ESOPHAGITIS: ICD-10-CM

## 2023-01-10 PROCEDURE — G8510 SCR DEP NEG, NO PLAN REQD: HCPCS | Performed by: INTERNAL MEDICINE

## 2023-01-10 PROCEDURE — G8427 DOCREV CUR MEDS BY ELIG CLIN: HCPCS | Performed by: INTERNAL MEDICINE

## 2023-01-10 PROCEDURE — G8417 CALC BMI ABV UP PARAM F/U: HCPCS | Performed by: INTERNAL MEDICINE

## 2023-01-10 PROCEDURE — G0439 PPPS, SUBSEQ VISIT: HCPCS | Performed by: INTERNAL MEDICINE

## 2023-01-10 PROCEDURE — G8536 NO DOC ELDER MAL SCRN: HCPCS | Performed by: INTERNAL MEDICINE

## 2023-01-10 PROCEDURE — 1101F PT FALLS ASSESS-DOCD LE1/YR: CPT | Performed by: INTERNAL MEDICINE

## 2023-01-10 PROCEDURE — 99497 ADVNCD CARE PLAN 30 MIN: CPT | Performed by: INTERNAL MEDICINE

## 2023-01-10 PROCEDURE — 3017F COLORECTAL CA SCREEN DOC REV: CPT | Performed by: INTERNAL MEDICINE

## 2023-01-10 NOTE — ASSESSMENT & PLAN NOTE
Asymptomatic, BP is well controlled, lipids were previously elevated, will repeat labs. Continue: current plan pending review of labs but did review if LDL remains > 100 needs to consider statin.  Did recommend starting aspirin 81mg, he wants to start slowly (once/wk)

## 2023-01-10 NOTE — PROGRESS NOTES
Lavell Rey is a 78 y.o. male who was seen in clinic today (1/10/2023) for a full physical.          Assessment & Plan:   Below is the assessment and plan developed based on review of pertinent history, physical exam, labs, studies, and medications. 1. Medicare annual wellness visit, subsequent  2. Advanced care planning/counseling discussion  -     ADVANCE CARE PLANNING FIRST 30 MINS  3. Gastroesophageal reflux disease without esophagitis  Assessment & Plan:  Well controlled off medications, continue diet changes   4. Mixed sleep apnea  Assessment & Plan:  New dx, seeing specialist, fatigue improved significantly, monitored by specialist. No acute findings meriting change in the plan  5. Male pattern baldness  Assessment & Plan:  Stable, continue current treatment, PSA is monitored by specialist   6. History of cerebellar stroke  Assessment & Plan:  Asymptomatic, BP is well controlled, lipids were previously elevated, will repeat labs. Continue: current plan pending review of labs but did review if LDL remains > 100 needs to consider statin. Did recommend starting aspirin 81mg, he wants to start slowly (once/wk)  Orders:  -     METABOLIC PANEL, COMPREHENSIVE; Future  -     CBC W/O DIFF; Future  -     LIPID PANEL; Future  7. Screening for ischemic heart disease  -     LIPID PANEL; Future    Follow-up and Dispositions    Return in about 1 year (around 1/10/2024), or if symptoms worsen or fail to improve. Subjective:   Haydee Sorenson is here today for a full physical.      Annual Wellness Visit- Subsequent Visit    Since last visit: new dx of MIKE    End of Life Planning:  He has an advanced directive - a copy has been provided but he reports he needs to update this. Blank form provided. Reviewed DNR/DNI and patient is not interested- elects Full Code (attempt resuscitation).          Depression Screen:  3 most recent PHQ Screens 1/10/2023   Little interest or pleasure in doing things Not at all   Feeling down, depressed, irritable, or hopeless Not at all   Total Score PHQ 2 0   Trouble falling or staying asleep, or sleeping too much -   Feeling tired or having little energy -   Poor appetite, weight loss, or overeating -   Feeling bad about yourself - or that you are a failure or have let yourself or your family down -   Trouble concentrating on things such as school, work, reading, or watching TV -   Moving or speaking so slowly that other people could have noticed; or the opposite being so fidgety that others notice -   Thoughts of being better off dead, or hurting yourself in some way -   PHQ 9 Score -   How difficult have these problems made it for you to do your work, take care of your home and get along with others -         Fall Risk:   Fall Risk Assessment, last 12 mths 1/10/2023   Able to walk? Yes   Fall in past 12 months? 0   Do you feel unsteady? 0   Are you worried about falling 0       Abuse Screen:  Abuse Screening Questionnaire 1/10/2023   Do you ever feel afraid of your partner? N   Are you in a relationship with someone who physically or mentally threatens you? N   Is it safe for you to go home? Y       Do you average more than 1 drink per night or more than 7 drinks a week: No    In the past three months have you have had more than 4 drinks containing alcohol on one occasion: No          Health Maintenance:   Daily Aspirin: no  AAA Screening: n/a: never smoked    Immunizations:   Covid: up to date  - records requested   Influenza: up to date - records requested   Tetanus: up to date   Shingles: up to date   Pneumonia: up to date  Cancer screening:   Prostate: guidelines reviewed, monitored by urologist  Colon: guidelines reviewed, up to date    Functional Ability and Level of Safety:    Hearing: Hearing is good.       Cognition Screen:   Has your family/caregiver stated any concerns about your memory: no  Cognitive Screening: Normal - Mini Cog Test      Ambulation: with no difficulty     Activities of Daily Living: The home contains: no safety equipment. Patient does total self care  Exercise: very active    Adult Nutrition Screen:  No risk factors noted. Patient Care Team:  Michelle Malhotra MD as PCP - General (Internal Medicine Physician)  Michelle Malhotra MD as PCP - [facility] Empaneled Provider  Khari Sena MD (Urology)  Jessica Ruiz MD (Hematology and Oncology)  Shelly No MD (Pulmonary Disease)  Kendra Rizo MD (General Surgery)  Alyssa Paris OD (Optometry)  Michelle Vivas MD (Otolaryngology)       The following sections were reviewed & updated as appropriate: Problem List, Allergies, PMH, PSH, FH, and SH. Prior to Admission medications    Medication Sig Start Date End Date Taking? Authorizing Provider   multivitamin/iron/folic acid (CENTRUM PO) Take  by mouth. Yes Provider, Historical   diclofenac (VOLTAREN) 1 % gel Apply  to affected area. Yes Provider, Historical   finasteride (PROSCAR) 5 mg tablet Take 0.5 Tablets by mouth daily. 1/14/22  Yes Michelle Malhotra MD   econazole nitrate (SPECTAZOLE) 1 % topical cream Apply to effected as needed twice a day. 1/14/22  Yes Michelle Malhotra MD   lidocaine (LIDODERM) 5 % 1 Patch by TransDERmal route every twelve (12) hours. Apply patch to the affected area for 12 hours a day and remove for 12 hours a day. 12/22/22 1/10/23  Carlos Demarco MD   famotidine (PEPCID) 40 mg tablet Take 20 mg by mouth daily. Patient not taking: Reported on 1/10/2023  1/10/23  Provider, Historical   fluocinoNIDE (LIDEX) 0.05 % ointment Apply  to affected area two (2) times a day. Patient not taking: No sig reported 1/14/22   Michelle Malhotra MD          Review of Systems   Constitutional:  Negative for chills, fever and malaise/fatigue. Respiratory:  Negative for cough and shortness of breath. Cardiovascular:  Negative for chest pain and palpitations.    Gastrointestinal:  Negative for abdominal pain, blood in stool, constipation, diarrhea, heartburn, nausea and vomiting. Genitourinary:         He reports: nocturia x 1. He denies: urinary hesitancy, urinary frequency, weak stream.       Denies trouble getting or maintaining an erection. Denies trouble with AM erections. Musculoskeletal:  Negative for joint pain and myalgias. Neurological:  Negative for tingling, focal weakness and headaches. Endo/Heme/Allergies:  Does not bruise/bleed easily. Psychiatric/Behavioral:  Negative for depression. The patient is not nervous/anxious and does not have insomnia. Objective:   Physical Exam  Constitutional:       General: He is not in acute distress. Eyes:      Conjunctiva/sclera: Conjunctivae normal.   Cardiovascular:      Rate and Rhythm: Regular rhythm. Heart sounds: No murmur heard. Pulmonary:      Effort: Pulmonary effort is normal.      Breath sounds: Normal breath sounds. No decreased breath sounds or wheezing. Abdominal:      General: Bowel sounds are normal.      Palpations: Abdomen is soft. There is no hepatomegaly or splenomegaly. Tenderness: There is no abdominal tenderness. Musculoskeletal:      Right lower leg: No edema. Left lower leg: No edema.    Psychiatric:         Mood and Affect: Mood and affect normal.         Behavior: Behavior normal.        Visit Vitals  /75   Pulse 65   Temp 97.9 °F (36.6 °C) (Temporal)   Resp 18   Ht 5' 11\" (1.803 m)   Wt 191 lb (86.6 kg)   SpO2 98%   BMI 26.64 kg/m²         Jodi Victoria MD

## 2023-01-10 NOTE — PATIENT INSTRUCTIONS
Medicare Wellness Visit, Male    The best way to live healthy is to have a lifestyle where you eat a well-balanced diet, exercise regularly, limit alcohol use, and quit all forms of tobacco/nicotine, if applicable. Regular preventive services are another way to keep healthy. Preventive services (vaccines, screening tests, monitoring & exams) can help personalize your care plan, which helps you manage your own care. Screening tests can find health problems at the earliest stages, when they are easiest to treat. [facility] follows the current, evidence-based guidelines published by the [facility] ([facility]) when recommending preventive services for our patients. Because we follow these guidelines, sometimes recommendations change over time as research supports it. (For example, a prostate screening blood test is no longer routinely recommended for men with no symptoms). Of course, you and your doctor may decide to screen more often for some diseases, based on your risk and co-morbidities (chronic disease you are already diagnosed with). Preventive services for you include:  - Medicare offers their members a free annual wellness visit, which is time for you and your primary care provider to discuss and plan for your preventive service needs.  Take advantage of this benefit every year!    -Over the age of 72 should receive the recommended pneumonia vaccines.   -All adults should have a flu vaccine yearly.  -All adults should receive a tetanus vaccine every 10 years.  -Over the age of 48 should receive the shingles vaccines.    -All adults should be screened once for Hepatitis C.  -All adults age 38-68 who are overweight should have a diabetes screening test once every three years.   -Other screening tests & preventive services for persons with diabetes include: an eye exam to screen for diabetic retinopathy, a kidney function test, a foot exam, and stricter control over your cholesterol.   -Cardiovascular screening for adults with routine risk involves an electrocardiogram (ECG) at intervals determined by the provider.     -Colorectal cancer screening should be done for adults age 43-69 with no increased risk factors for colorectal cancer. There are a number of acceptable methods of screening for this type of cancer. Each test has its own benefits and drawbacks. Discuss with your provider what is most appropriate for you during your annual wellness visit. The different tests include: colonoscopy (considered the best screening method), a fecal occult blood test, a fecal DNA test, and sigmoidoscopy.    -Lung cancer screening is recommended annually with a low dose CT scan for adults between age 54 and 68, who have smoked at least 30 pack years (equivalent of 1 pack per day for 30 days), and who is a current smoker or quit less than 15 years ago. -An Abdominal Aortic Aneurysm (AAA) Screening is recommended for men age 73-68 who has ever smoked in their lifetime. Here is a list of your current Health Maintenance items (your personalized list of preventive services) with a due date:  Health Maintenance Due   Topic Date Due    COVID-19 Vaccine (4 - Booster for Morley Peter series) 12/20/2021    Yearly Flu Vaccine (1) 08/01/2022            Learning About Living James  What is a living will? A living will is a legal form you use to write down the kind of care you want at the end of your life. It is used by the health professionals who will treat you if you aren't able to decide for yourself. If you put your wishes in writing, your loved ones and others will know what kind of care you want. They won't need to guess. This can ease your mind and be helpful to others. A living will is not the same as an estate or property will. An estate will explains what you want to happen with your money and property after you die. Is a living will a legal document?   A living will is a legal document. Each state has its own laws about living hoffman. If you move to another state, make sure that your living will is legal in the state where you now live. Or you might use a universal form that has been approved by many states. This kind of form can sometimes be completed and stored online. Your electronic copy will then be available wherever you have a connection to the Internet. In most cases, doctors will respect your wishes even if you have a form from a different state. You don't need an  to complete a living will. But legal advice can be helpful if your state's laws are unclear, your health history is complicated, or your family can't agree on what should be in your living will. You can change your living will at any time. Some people find that their wishes about end-of-life care change as their health changes. In addition to making a living will, think about completing a medical power of  form. This form lets you name the person you want to make end-of-life treatment decisions for you (your \"health care agent\") if you're not able to. Many hospitals and nursing homes will give you the forms you need to complete a living will and a medical power of . Your living will is used only if you can't make or communicate decisions for yourself anymore. If you become able to make decisions again, you can accept or refuse any treatment, no matter what you wrote in your living will. Your state may offer an online registry. This is a place where you can store your living will online so the doctors and nurses who need to treat you can find it right away. What should you think about when creating a living will? Talk about your end-of-life wishes with your family members and your doctor. Let them know what you want. That way the people making decisions for you won't be surprised by your choices.   Think about these questions as you make your living will:  Do you know enough about life support methods that might be used? If not, talk to your doctor so you know what might be done if you can't breathe on your own, your heart stops, or you're unable to swallow. What things would you still want to be able to do after you receive life-support methods? Would you want to be able to walk? To speak? To eat on your own? To live without the help of machines? If you have a choice, where do you want to be cared for? In your home? At a hospital or nursing home? Do you want certain [employer] practices performed if you become very ill? If you have a choice at the end of your life, where would you prefer to die? At home? In a hospital or nursing home? Somewhere else? Would you prefer to be buried or cremated? Do you want your organs to be donated after you die? What should you do with your living will? Make sure that your family members and your health care agent have copies of your living will. Give your doctor a copy of your living will to keep in your medical record. If you have more than one doctor, make sure that each one has a copy. You may want to put a copy of your living will where it can be easily found. Where can you learn more? Go to http://www.gray.com/. Enter N483 in the search box to learn more about \"Learning About Living Perromaury. \"  Current as of: August 8, 2016  Content Version: 11.3  © 1972-9368 [employer]. Care instructions adapted under license by [employer] (which disclaims liability or warranty for this information). If you have questions about a medical condition or this instruction, always ask your healthcare professional. [address] any warranty or liability for your use of this information.

## 2023-01-10 NOTE — ACP (ADVANCE CARE PLANNING)
Advance Care Planning   Advance Care Planning (ACP) Physician/NP/PA (Provider) Conversation    Date of ACP Conversation: 01/10/23  Persons included in Conversation:  patient  Length of ACP Conversation in minutes: 16 minutes    Authorized Decision Maker (if patient is incapable of making informed decisions):   Named in Advance Directive or Healthcare Power of       He has an advanced directive - a copy has been provided but he reports he needs to update this. Blank form provided. Reviewed DNR/DNI and patient is not interested- elects Full Code (attempt resuscitation).        Komal Mcknight MD

## 2023-01-10 NOTE — ASSESSMENT & PLAN NOTE
New dx, seeing specialist, fatigue improved significantly, monitored by specialist. No acute findings meriting change in the plan

## 2023-01-11 LAB
ALBUMIN SERPL-MCNC: 3.5 G/DL (ref 3.5–5)
ALBUMIN/GLOB SERPL: 0.9 (ref 1.1–2.2)
ALP SERPL-CCNC: 73 U/L (ref 45–117)
ALT SERPL-CCNC: 19 U/L (ref 12–78)
ANION GAP SERPL CALC-SCNC: 2 MMOL/L (ref 5–15)
AST SERPL-CCNC: 15 U/L (ref 15–37)
BILIRUB SERPL-MCNC: 0.6 MG/DL (ref 0.2–1)
BUN SERPL-MCNC: 27 MG/DL (ref 6–20)
BUN/CREAT SERPL: 30 (ref 12–20)
CALCIUM SERPL-MCNC: 9.5 MG/DL (ref 8.5–10.1)
CHLORIDE SERPL-SCNC: 106 MMOL/L (ref 97–108)
CHOLEST SERPL-MCNC: 186 MG/DL
CO2 SERPL-SCNC: 30 MMOL/L (ref 21–32)
CREAT SERPL-MCNC: 0.9 MG/DL (ref 0.7–1.3)
ERYTHROCYTE [DISTWIDTH] IN BLOOD BY AUTOMATED COUNT: 13.1 % (ref 11.5–14.5)
GLOBULIN SER CALC-MCNC: 3.8 G/DL (ref 2–4)
GLUCOSE SERPL-MCNC: 84 MG/DL (ref 65–100)
HCT VFR BLD AUTO: 45.4 % (ref 36.6–50.3)
HDLC SERPL-MCNC: 57 MG/DL
HDLC SERPL: 3.3 (ref 0–5)
HGB BLD-MCNC: 14 G/DL (ref 12.1–17)
LDLC SERPL CALC-MCNC: 113.4 MG/DL (ref 0–100)
MCH RBC QN AUTO: 28.7 PG (ref 26–34)
MCHC RBC AUTO-ENTMCNC: 30.8 G/DL (ref 30–36.5)
MCV RBC AUTO: 93 FL (ref 80–99)
NRBC # BLD: 0 K/UL (ref 0–0.01)
NRBC BLD-RTO: 0 PER 100 WBC
PLATELET # BLD AUTO: 271 K/UL (ref 150–400)
PMV BLD AUTO: 10.4 FL (ref 8.9–12.9)
POTASSIUM SERPL-SCNC: 4.6 MMOL/L (ref 3.5–5.1)
PROT SERPL-MCNC: 7.3 G/DL (ref 6.4–8.2)
RBC # BLD AUTO: 4.88 M/UL (ref 4.1–5.7)
SODIUM SERPL-SCNC: 138 MMOL/L (ref 136–145)
TRIGL SERPL-MCNC: 78 MG/DL (ref ?–150)
VLDLC SERPL CALC-MCNC: 15.6 MG/DL
WBC # BLD AUTO: 7.8 K/UL (ref 4.1–11.1)

## 2023-01-11 NOTE — PROGRESS NOTES
Results released to patient via [employer]. All labs are stable or at goal for him, except for LDL above goal.  Do to recent MRI that shows old CVA will recommend statin to get LDL < 100.

## 2023-01-13 DIAGNOSIS — Z86.73 HISTORY OF CEREBELLAR STROKE: Primary | ICD-10-CM

## 2023-01-13 RX ORDER — PRAVASTATIN SODIUM 10 MG/1
10 TABLET ORAL
Qty: 90 TABLET | Refills: 0 | Status: SHIPPED | OUTPATIENT
Start: 2023-01-13

## 2023-01-23 DIAGNOSIS — B35.1 ONYCHOMYCOSIS: Primary | ICD-10-CM

## 2023-01-23 RX ORDER — TAVABOROLE TOPICAL SOLUTION, 5% 43.5 MG/ML
SOLUTION TOPICAL
Qty: 10 ML | Refills: 1 | Status: SHIPPED | OUTPATIENT
Start: 2023-01-23

## 2023-01-25 ENCOUNTER — TELEPHONE (OUTPATIENT)
Dept: INTERNAL MEDICINE CLINIC | Age: 78
End: 2023-01-25

## 2023-01-25 NOTE — TELEPHONE ENCOUNTER
PA for Tavaborole denied. Denial letter scanned to pt chart. Using [facility], pt can get his rx for $31.74. Pt may want to transfer rx and use [employer] to get it. Can get medication approved if pt is immunocompromised, or has a diagnosis of diabetes mellitus or peripheral vascular disease. Please advise on next steps.

## 2023-03-03 DIAGNOSIS — L64.9 MALE PATTERN BALDNESS: ICD-10-CM

## 2023-03-03 RX ORDER — FINASTERIDE 5 MG/1
TABLET, FILM COATED ORAL
Qty: 90 TABLET | Refills: 1 | Status: SHIPPED | OUTPATIENT
Start: 2023-03-03

## 2023-03-04 NOTE — TELEPHONE ENCOUNTER
Future Appointments   Date Time Provider Cecilio Callie   1/10/2024  4:20 PM Karyle Bulla, MD [facility] [place] BS AMB   1/19/2024  8:20 AM Essie Gallegos MD [facility]

## 2023-04-13 NOTE — PATIENT INSTRUCTIONS
Central Line/PA Catheter Placement    Pre-Procedure   Staff -        Anesthesiologist:  Laura Borden MD       CRNA: Guillermo Ulloa       Performed By: MILTON       Location: OR       Pre-Anesthestic Checklist: patient identified, IV checked, site marked, risks and benefits discussed, informed consent, monitors and equipment checked, pre-op evaluation and at physician/surgeon's request  Timeout:       Correct Patient: Yes        Correct Procedure: Yes        Correct Site: Yes        Correct Position: Yes        Correct Laterality: Yes   Line Placement:   This line was placed Post Induction    Procedure   Procedure: central line and elective       Laterality: left       Insertion Site: internal jugular.       Patient Position: Trendelenburg  Sterile Prep        All elements of maximal sterile barrier technique followed       Patient Prep/Sterile Barriers: draped, hand hygiene, gloves , hat , mask , draped, gown, sterile gel and probe cover       Skin prep: Chloraprep  Insertion/Injection        Technique: ultrasound guided        1. Ultrasound was used to evaluate the access site.       2. Vein evaluated via ultrasound for patency/adequacy.       3. Using real-time ultrasound the needle/catheter was observed entering the artery/vein.       Type: CVC       Catheter Size: 7 Fr       Catheter Length: 20       Number of Lumens: triple lumen  Narrative         Secured by: suture and anchor securement device       Tegaderm and Biopatch dressing used.       Complications: None apparent,        blood aspirated from all lumens,        All lumens flushed: Yes       Verification method: Ultrasound and Placement to be verified post-op       Medicare Wellness Visit, Male    The best way to live healthy is to have a lifestyle where you eat a well-balanced diet, exercise regularly, limit alcohol use, and quit all forms of tobacco/nicotine, if applicable. Regular preventive services are another way to keep healthy. Preventive services (vaccines, screening tests, monitoring & exams) can help personalize your care plan, which helps you manage your own care. Screening tests can find health problems at the earliest stages, when they are easiest to treat. [facility] follows the current, evidence-based guidelines published by the [facility] ([facility]) when recommending preventive services for our patients. Because we follow these guidelines, sometimes recommendations change over time as research supports it. (For example, a prostate screening blood test is no longer routinely recommended for men with no symptoms). Of course, you and your doctor may decide to screen more often for some diseases, based on your risk and co-morbidities (chronic disease you are already diagnosed with). Preventive services for you include:  - Medicare offers their members a free annual wellness visit, which is time for you and your primary care provider to discuss and plan for your preventive service needs. Take advantage of this benefit every year!  -All adults over age 72 should receive the recommended pneumonia vaccines. Current USPSTF guidelines recommend a series of two vaccines for the best pneumonia protection.   -All adults should have a flu vaccine yearly and tetanus vaccine every 10 years.  -All adults age 48 and older should receive the shingles vaccines (series of two vaccines).        -All adults age 38-68 who are overweight should have a diabetes screening test once every three years.   -Other screening tests & preventive services for persons with diabetes include: an eye exam to screen for diabetic retinopathy, a kidney function test, a foot exam, and stricter control over your cholesterol.   -Cardiovascular screening for adults with routine risk involves an electrocardiogram (ECG) at intervals determined by the provider.   -Colorectal cancer screening should be done for adults age 54-65 with no increased risk factors for colorectal cancer. There are a number of acceptable methods of screening for this type of cancer. Each test has its own benefits and drawbacks. Discuss with your provider what is most appropriate for you during your annual wellness visit. The different tests include: colonoscopy (considered the best screening method), a fecal occult blood test, a fecal DNA test, and sigmoidoscopy.  -All adults born between [facility] should be screened once for Hepatitis C.  -An Abdominal Aortic Aneurysm (AAA) Screening is recommended for men age 73-68 who has ever smoked in their lifetime. Here is a list of your current Health Maintenance items (your personalized list of preventive services) with a due date: There are no preventive care reminders to display for this patient. Hearing Evaluations:    [facility]   [email or web address]    [phone] --> [facility]  [phone] --> [address] [facility]  ([place])  [phone] --> [phone] [facility]  ([address] [place] [address])      [address]  [place]    [phone]  --> [address] [facility] [facility] [address]   [place], [facility] [address]

## 2023-07-05 DIAGNOSIS — Z86.73 HISTORY OF CEREBELLAR STROKE: ICD-10-CM

## 2023-07-05 DIAGNOSIS — E78.00 PURE HYPERCHOLESTEROLEMIA: ICD-10-CM

## 2023-07-06 DIAGNOSIS — Z86.73 HISTORY OF CEREBELLAR STROKE: ICD-10-CM

## 2023-07-06 LAB
ALBUMIN SERPL-MCNC: 3.6 G/DL (ref 3.5–5)
ALBUMIN/GLOB SERPL: 1 (ref 1.1–2.2)
ALP SERPL-CCNC: 67 U/L (ref 45–117)
ALT SERPL-CCNC: 24 U/L (ref 12–78)
AST SERPL-CCNC: 17 U/L (ref 15–37)
BILIRUB DIRECT SERPL-MCNC: 0.1 MG/DL (ref 0–0.2)
BILIRUB SERPL-MCNC: 0.5 MG/DL (ref 0.2–1)
CHOLEST SERPL-MCNC: 174 MG/DL
GLOBULIN SER CALC-MCNC: 3.7 G/DL (ref 2–4)
HDLC SERPL-MCNC: 53 MG/DL
HDLC SERPL: 3.3 (ref 0–5)
LDLC SERPL CALC-MCNC: 100.6 MG/DL (ref 0–100)
PROT SERPL-MCNC: 7.3 G/DL (ref 6.4–8.2)
TRIGL SERPL-MCNC: 102 MG/DL
VLDLC SERPL CALC-MCNC: 20.4 MG/DL

## 2023-07-07 NOTE — RESULT ENCOUNTER NOTE
Results released to patient via [employer]. Lipids are unchanged to slightly worse even w/ statin dose increase. He is having myalgias on higher dose so will wean off and then will offer a different statin.

## 2023-07-20 RX ORDER — PRAVASTATIN SODIUM 10 MG
TABLET ORAL
Qty: 90 TABLET | OUTPATIENT
Start: 2023-07-20

## 2023-07-25 ENCOUNTER — ANESTHESIA EVENT (OUTPATIENT)
Facility: HOSPITAL | Age: 78
End: 2023-07-25
Payer: MEDICARE

## 2023-07-25 ENCOUNTER — ANESTHESIA (OUTPATIENT)
Facility: HOSPITAL | Age: 78
End: 2023-07-25
Payer: MEDICARE

## 2023-07-25 ENCOUNTER — HOSPITAL ENCOUNTER (OUTPATIENT)
Facility: HOSPITAL | Age: 78
Discharge: HOME | End: 2023-07-25
Attending: INTERNAL MEDICINE | Admitting: INTERNAL MEDICINE
Payer: MEDICARE

## 2023-07-25 VITALS
RESPIRATION RATE: 14 BRPM | WEIGHT: 205 LBS | TEMPERATURE: 99.8 F | HEIGHT: 71 IN | DIASTOLIC BLOOD PRESSURE: 76 MMHG | SYSTOLIC BLOOD PRESSURE: 118 MMHG | HEART RATE: 60 BPM | OXYGEN SATURATION: 96 % | BODY MASS INDEX: 28.7 KG/M2

## 2023-07-25 PROCEDURE — 2580000003 HC RX 258: Performed by: INTERNAL MEDICINE

## 2023-07-25 PROCEDURE — 2709999900 HC NON-CHARGEABLE SUPPLY: Performed by: INTERNAL MEDICINE

## 2023-07-25 PROCEDURE — 7100000011 HC PHASE II RECOVERY - ADDTL 15 MIN: Performed by: INTERNAL MEDICINE

## 2023-07-25 PROCEDURE — 2500000003 HC RX 250 WO HCPCS: Performed by: NURSE ANESTHETIST, CERTIFIED REGISTERED

## 2023-07-25 PROCEDURE — 3700000000 HC ANESTHESIA ATTENDED CARE: Performed by: INTERNAL MEDICINE

## 2023-07-25 PROCEDURE — 88305 TISSUE EXAM BY PATHOLOGIST: CPT

## 2023-07-25 PROCEDURE — 3600007502: Performed by: INTERNAL MEDICINE

## 2023-07-25 PROCEDURE — 6360000002 HC RX W HCPCS: Performed by: NURSE ANESTHETIST, CERTIFIED REGISTERED

## 2023-07-25 PROCEDURE — 7100000010 HC PHASE II RECOVERY - FIRST 15 MIN: Performed by: INTERNAL MEDICINE

## 2023-07-25 RX ORDER — SODIUM CHLORIDE 9 MG/ML
INJECTION, SOLUTION INTRAVENOUS CONTINUOUS PRN
Status: COMPLETED | OUTPATIENT
Start: 2023-07-25 | End: 2023-07-25

## 2023-07-25 RX ORDER — SODIUM CHLORIDE 0.9 % (FLUSH) 0.9 %
5-40 SYRINGE (ML) INJECTION PRN
Status: DISCONTINUED | OUTPATIENT
Start: 2023-07-25 | End: 2023-07-25 | Stop reason: HOSPADM

## 2023-07-25 RX ORDER — SODIUM CHLORIDE 9 MG/ML
25 INJECTION, SOLUTION INTRAVENOUS PRN
Status: DISCONTINUED | OUTPATIENT
Start: 2023-07-25 | End: 2023-07-25 | Stop reason: HOSPADM

## 2023-07-25 RX ORDER — SODIUM CHLORIDE 0.9 % (FLUSH) 0.9 %
5-40 SYRINGE (ML) INJECTION EVERY 12 HOURS SCHEDULED
Status: DISCONTINUED | OUTPATIENT
Start: 2023-07-25 | End: 2023-07-25 | Stop reason: HOSPADM

## 2023-07-25 RX ORDER — PANTOPRAZOLE SODIUM 40 MG/1
40 TABLET, DELAYED RELEASE ORAL
Qty: 90 TABLET | Refills: 3 | Status: SHIPPED | OUTPATIENT
Start: 2023-07-25 | End: 2023-10-23

## 2023-07-25 RX ORDER — SODIUM CHLORIDE 9 MG/ML
INJECTION, SOLUTION INTRAVENOUS CONTINUOUS
Status: DISCONTINUED | OUTPATIENT
Start: 2023-07-25 | End: 2023-07-25 | Stop reason: HOSPADM

## 2023-07-25 RX ORDER — ASPIRIN 81 MG/1
81 TABLET ORAL DAILY
COMMUNITY

## 2023-07-25 RX ORDER — LIDOCAINE HYDROCHLORIDE 20 MG/ML
INJECTION, SOLUTION EPIDURAL; INFILTRATION; INTRACAUDAL; PERINEURAL PRN
Status: DISCONTINUED | OUTPATIENT
Start: 2023-07-25 | End: 2023-07-25 | Stop reason: SDUPTHER

## 2023-07-25 RX ADMIN — SODIUM CHLORIDE: 9 INJECTION, SOLUTION INTRAVENOUS at 09:19

## 2023-07-25 RX ADMIN — PROPOFOL 100 MG: 10 INJECTION, EMULSION INTRAVENOUS at 09:22

## 2023-07-25 RX ADMIN — LIDOCAINE HYDROCHLORIDE 40 MG: 20 INJECTION, SOLUTION EPIDURAL; INFILTRATION; INTRACAUDAL; PERINEURAL at 09:22

## 2023-07-25 RX ADMIN — PROPOFOL 25 MG: 10 INJECTION, EMULSION INTRAVENOUS at 09:24

## 2023-07-25 NOTE — PERIOP NOTE

## 2023-07-25 NOTE — ANESTHESIA POSTPROCEDURE EVALUATION
Department of Anesthesiology  Postprocedure Note    Patient: Mimi Martinez  MRN: [id number]  YOB: 1945  Date of evaluation: 7/25/2023      Procedure Summary     Date: 07/25/23 Room / Location: [facility] ENDO [address] [place] [address] ENDOSCOPY    Anesthesia Start: 0918 Anesthesia Stop: 0929    Procedures:       ESOPHAGOGASTRODUODENOSCOPY      EGD BIOPSY Diagnosis:       Gastroesophageal reflux disease, unspecified whether esophagitis present      Sleep apnea, unspecified type      (Gastroesophageal reflux disease, unspecified whether esophagitis present [K21.9])      (Sleep apnea, unspecified type [G47.30])    Surgeons: Angela Epps MD Responsible Provider: Clark Gross MD    Anesthesia Type: MAC ASA Status: 3          Anesthesia Type: MAC    Jimmie Phase I:      Jimmie Phase II: Jimmie Score: 9      Anesthesia Post Evaluation    Patient location during evaluation: PACU  Patient participation: complete - patient participated  Level of consciousness: awake  Pain score: 0  Airway patency: patent  Nausea & Vomiting: no nausea  Complications: no  Cardiovascular status: blood pressure returned to baseline and hemodynamically stable  Respiratory status: acceptable  Hydration status: stable

## 2023-07-25 NOTE — OP NOTE
[facility]  [address], [address] [facility]        Esophago- Gastroduodenoscopy (EGD) Procedure Note    Samantha Marquez  1945  [id number]      Procedure: Endoscopic Gastroduodenoscopy with biopsy    Indication: heartburn, dysphagia, abnormal Barium swallow      Pre-operative Diagnosis: see indication above    Post-operative Diagnosis: see findings below    : Nataly Deleon MD    Surgical Assistant: Circulator: Troy Shelton. Sonia Pink RN  Endoscopy Technician: Gerard Amado    Implants:  None    Referring Provider:  No primary care provider on file. Anesthesia/Sedation:  MAC anesthesia Propofol        Procedure Details     After infomed consent was obtained for the procedure, with all risks and benefits of procedure explained the patient was taken to the endoscopy suite and placed in the left lateral decubitus position. Following sequential administration of sedation as per above, the endoscope was inserted into the mouth and advanced under direct vision to third portion of the duodenum. A careful inspection was made as the gastroscope was withdrawn, including a retroflexed view of the proximal stomach; findings and interventions are described below. Findings:   Esophagus:hiatal hernia 3 cm in size   Grade 2 erosive esophagitis at G-E junction    Rest of esophagus was normal, random biopsies taken     Stomach: normal   Duodenum: normal      Therapies:  none    Specimens:  as above          EBL: None      Complications:   None; patient tolerated the procedure well. Impression:    -See post-procedure diagnoses.     Recommendations:  -start him on daily protonix 40 mg/d for 6 months  -follow up in 4 months       Signed By: Nataly Deleon MD     7/25/2023  9:34 AM

## 2023-07-25 NOTE — DISCHARGE INSTRUCTIONS
[facility]  [address], [address] [facility]    EGD DISCHARGE INSTRUCTIONS    Juan Francisco Zabala  [id number]  1945    Discomfort:  Sore throat- throat lozenges or warm salt water gargle  redness at IV site- apply warm compress to area; if redness or soreness persist- contact your physician  Gaseous discomfort- walking, belching will help relieve any discomfort  You may not operate a vehicle for 12 hours  You may not engage in an occupation involving machinery or appliances for rest of today  You may not drink alcoholic beverages for at least 12 hours  Avoid making any critical decisions for at least 24 hour  DIET  You may resume your regular diet - however -  remember your colon is empty and a heavy meal will produce gas. Avoid these foods:  vegetables, fried / greasy foods, carbonated drinks    ACTIVITY  You may resume your normal daily activities   Spend the remainder of the day resting -  avoid any strenuous activity. CALL M.D.   ANY SIGN OF   Increasing pain, nausea, vomiting  Abdominal distension (swelling)  New increased bleeding (oral or rectal)  Fever (chills)  Pain in chest area  Bloody discharge from nose or mouth  Shortness of breath    Follow-up Instructions:   Call Dr. Romeo Anaya for any questions or problems, and follow up in 3 to 4 months    Telephone # [phone]  Take daily protonix for 6 months     ENDOSCOPY FINDINGS:   Your endoscopy showed small hiatal hernia and esophagitis ( inflammation from acid reflux), rest of exam was normal .    Signed By: Romeo Anaya MD     7/25/2023  9:40 AM

## 2023-07-25 NOTE — H&P
topically   econazole nitrate 1 % cream   Yes No   Sig: Apply to effected as needed twice a day. finasteride (PROSCAR) 5 MG tablet   Yes No   Sig: TAKE 1/2 OF TABLET BY MOUTH ONCE DAILY   fluocinonide (LIDEX) 0.05 % ointment   Yes No   Sig: Apply topically 2 times daily      Facility-Administered Medications: None       Hospital Medications:  Current Facility-Administered Medications   Medication Dose Route Frequency    0.9 % sodium chloride infusion   IntraVENous Continuous    sodium chloride flush 0.9 % injection 5-40 mL  5-40 mL IntraVENous 2 times per day    sodium chloride flush 0.9 % injection 5-40 mL  5-40 mL IntraVENous PRN    0.9 % sodium chloride infusion  25 mL IntraVENous PRN       Social History:  Social History     Tobacco Use    Smoking status: Never    Smokeless tobacco: Never   Substance Use Topics    Alcohol use: Yes       Family History:  Family History   Problem Relation Age of Onset    Atrial Fibrillation Sister     Hearing Impairment Sister     Hearing Impairment Brother     Heart Attack Father     Other Mother         CONFUSION AFTER HIP SURG AT AGE 80    Osteoarthritis Mother     Breast Cancer Sister          The patient was counseled at length about the risks of manuel Covid-19 in the yosvany-operative and post-operative states including the recovery window of their procedure. The patient was made aware that manuel Covid-19 after a surgical procedure may worsen their prognosis for recovering from the virus and lend to a higher morbidity and or mortality risk. The patient was given the options of postponing their procedure. All of the risks, benefits, and alternatives were discussed. The patient does  wish to proceed with the procedure.     Review of Systems:      Constitutional: negative fever, negative chills, negative weight loss  Eyes:   negative visual changes  ENT:   negative sore throat, tongue or lip swelling  Respiratory:  negative cough, negative dyspnea  Cards:  negative

## 2023-07-28 DIAGNOSIS — E78.00 PURE HYPERCHOLESTEROLEMIA: Primary | ICD-10-CM

## 2023-07-28 RX ORDER — PRAVASTATIN SODIUM 10 MG
5 TABLET ORAL DAILY
Qty: 45 TABLET | Refills: 3 | Status: SHIPPED | OUTPATIENT
Start: 2023-07-28

## 2023-10-31 ENCOUNTER — OFFICE VISIT (OUTPATIENT)
Age: 78
End: 2023-10-31
Payer: MEDICARE

## 2023-10-31 VITALS
HEIGHT: 71 IN | SYSTOLIC BLOOD PRESSURE: 138 MMHG | OXYGEN SATURATION: 98 % | TEMPERATURE: 97.6 F | HEART RATE: 64 BPM | BODY MASS INDEX: 27.44 KG/M2 | RESPIRATION RATE: 16 BRPM | DIASTOLIC BLOOD PRESSURE: 82 MMHG | WEIGHT: 196 LBS

## 2023-10-31 DIAGNOSIS — M79.604 BILATERAL LEG PAIN: ICD-10-CM

## 2023-10-31 DIAGNOSIS — I89.0 LYMPHEDEMA OF RIGHT LOWER EXTREMITY: Primary | ICD-10-CM

## 2023-10-31 DIAGNOSIS — M79.605 BILATERAL LEG PAIN: ICD-10-CM

## 2023-10-31 DIAGNOSIS — Z86.73 HISTORY OF CEREBELLAR STROKE: ICD-10-CM

## 2023-10-31 PROCEDURE — 99214 OFFICE O/P EST MOD 30 MIN: CPT | Performed by: INTERNAL MEDICINE

## 2023-10-31 PROCEDURE — G8419 CALC BMI OUT NRM PARAM NOF/U: HCPCS | Performed by: INTERNAL MEDICINE

## 2023-10-31 PROCEDURE — G8427 DOCREV CUR MEDS BY ELIG CLIN: HCPCS | Performed by: INTERNAL MEDICINE

## 2023-10-31 PROCEDURE — G8484 FLU IMMUNIZE NO ADMIN: HCPCS | Performed by: INTERNAL MEDICINE

## 2023-10-31 PROCEDURE — 1123F ACP DISCUSS/DSCN MKR DOCD: CPT | Performed by: INTERNAL MEDICINE

## 2023-10-31 PROCEDURE — 1036F TOBACCO NON-USER: CPT | Performed by: INTERNAL MEDICINE

## 2023-10-31 SDOH — ECONOMIC STABILITY: INCOME INSECURITY: HOW HARD IS IT FOR YOU TO PAY FOR THE VERY BASICS LIKE FOOD, HOUSING, MEDICAL CARE, AND HEATING?: NOT HARD AT ALL

## 2023-10-31 SDOH — ECONOMIC STABILITY: FOOD INSECURITY: WITHIN THE PAST 12 MONTHS, THE FOOD YOU BOUGHT JUST DIDN'T LAST AND YOU DIDN'T HAVE MONEY TO GET MORE.: NEVER TRUE

## 2023-10-31 SDOH — ECONOMIC STABILITY: FOOD INSECURITY: WITHIN THE PAST 12 MONTHS, YOU WORRIED THAT YOUR FOOD WOULD RUN OUT BEFORE YOU GOT MONEY TO BUY MORE.: NEVER TRUE

## 2023-10-31 SDOH — ECONOMIC STABILITY: HOUSING INSECURITY
IN THE LAST 12 MONTHS, WAS THERE A TIME WHEN YOU DID NOT HAVE A STEADY PLACE TO SLEEP OR SLEPT IN A SHELTER (INCLUDING NOW)?: NO

## 2023-10-31 ASSESSMENT — ENCOUNTER SYMPTOMS
SHORTNESS OF BREATH: 0
DIARRHEA: 0
CONSTIPATION: 0
ABDOMINAL PAIN: 0
VOMITING: 0
NAUSEA: 0
COUGH: 0

## 2023-10-31 ASSESSMENT — PATIENT HEALTH QUESTIONNAIRE - PHQ9
2. FEELING DOWN, DEPRESSED OR HOPELESS: 0
SUM OF ALL RESPONSES TO PHQ QUESTIONS 1-9: 0
1. LITTLE INTEREST OR PLEASURE IN DOING THINGS: 0
SUM OF ALL RESPONSES TO PHQ QUESTIONS 1-9: 0
SUM OF ALL RESPONSES TO PHQ QUESTIONS 1-9: 0
SUM OF ALL RESPONSES TO PHQ9 QUESTIONS 1 & 2: 0
SUM OF ALL RESPONSES TO PHQ QUESTIONS 1-9: 0

## 2023-10-31 NOTE — ASSESSMENT & PLAN NOTE
Will stop statin. He didn't tolerate 10mg or 20mg. Since restarting 5mg dose the time course for his leg pain is suspicious. It is interesting that is only one leg but will stop statin x 1 month and reassess.

## 2023-11-10 ENCOUNTER — HOSPITAL ENCOUNTER (OUTPATIENT)
Age: 78
End: 2023-11-10
Payer: MEDICARE

## 2023-11-10 DIAGNOSIS — I89.0 LYMPHEDEMA OF RIGHT LOWER EXTREMITY: ICD-10-CM

## 2023-11-10 PROCEDURE — 6360000004 HC RX CONTRAST MEDICATION: Performed by: STUDENT IN AN ORGANIZED HEALTH CARE EDUCATION/TRAINING PROGRAM

## 2023-11-10 PROCEDURE — 72193 CT PELVIS W/DYE: CPT

## 2023-11-10 RX ADMIN — IOPAMIDOL 100 ML: 755 INJECTION, SOLUTION INTRAVENOUS at 08:41

## 2023-11-13 DIAGNOSIS — M79.89 RIGHT LEG SWELLING: Primary | ICD-10-CM

## 2023-11-14 ENCOUNTER — TELEPHONE (OUTPATIENT)
Age: 78
End: 2023-11-14

## 2023-11-14 DIAGNOSIS — M79.89 RIGHT LEG SWELLING: Primary | ICD-10-CM

## 2023-11-14 NOTE — TELEPHONE ENCOUNTER
Reason for call:  the diagnosis does not meet medical necessity for medicare.     Is this a new problem: Yes    Date of last appointment:  10/31/2023     Can we respond via [employer]: No    Best call back number:    Mahnaz Fiorella, scheduling [phone]
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
## 2023-11-14 NOTE — TELEPHONE ENCOUNTER
Dr. Adele Arambula schedule has changed from Wednesday afternoons to Wednesday mornings, effective immediately. LM with patient to reschedule. Cancelled appt. ClickingHouse.

## 2023-11-21 ENCOUNTER — HOSPITAL ENCOUNTER (OUTPATIENT)
Age: 78
Discharge: HOME | End: 2023-11-24
Payer: MEDICARE

## 2023-11-21 DIAGNOSIS — M79.89 RIGHT LEG SWELLING: ICD-10-CM

## 2023-11-21 PROCEDURE — 93971 EXTREMITY STUDY: CPT

## 2023-11-21 NOTE — RESULT ENCOUNTER NOTE
Results reviewed. Results released via MyChart. Ruptured baker's cyst is likely the cause of his swelling.

## 2024-01-19 ENCOUNTER — OFFICE VISIT (OUTPATIENT)
Age: 79
End: 2024-01-19
Payer: MEDICARE

## 2024-01-19 VITALS
OXYGEN SATURATION: 99 % | DIASTOLIC BLOOD PRESSURE: 76 MMHG | BODY MASS INDEX: 27.13 KG/M2 | HEIGHT: 71 IN | TEMPERATURE: 97.5 F | WEIGHT: 193.8 LBS | RESPIRATION RATE: 16 BRPM | SYSTOLIC BLOOD PRESSURE: 132 MMHG | HEART RATE: 57 BPM

## 2024-01-19 DIAGNOSIS — E78.00 PURE HYPERCHOLESTEROLEMIA: ICD-10-CM

## 2024-01-19 DIAGNOSIS — Z86.73 HISTORY OF CEREBELLAR STROKE: ICD-10-CM

## 2024-01-19 DIAGNOSIS — Z00.00 MEDICARE ANNUAL WELLNESS VISIT, SUBSEQUENT: Primary | ICD-10-CM

## 2024-01-19 DIAGNOSIS — L64.9 MALE PATTERN BALDNESS: ICD-10-CM

## 2024-01-19 DIAGNOSIS — Z71.89 ADVANCED CARE PLANNING/COUNSELING DISCUSSION: ICD-10-CM

## 2024-01-19 DIAGNOSIS — I89.0 LYMPHEDEMA OF RIGHT LOWER EXTREMITY: ICD-10-CM

## 2024-01-19 DIAGNOSIS — H25.9 AGE-RELATED CATARACT OF BOTH EYES, UNSPECIFIED AGE-RELATED CATARACT TYPE: ICD-10-CM

## 2024-01-19 LAB
ALBUMIN SERPL-MCNC: 3.9 G/DL (ref 3.5–5)
ALBUMIN/GLOB SERPL: 1.1 (ref 1.1–2.2)
ALP SERPL-CCNC: 71 U/L (ref 45–117)
ALT SERPL-CCNC: 14 U/L (ref 12–78)
ANION GAP SERPL CALC-SCNC: 3 MMOL/L (ref 5–15)
AST SERPL-CCNC: 15 U/L (ref 15–37)
BILIRUB SERPL-MCNC: 0.6 MG/DL (ref 0.2–1)
BUN SERPL-MCNC: 26 MG/DL (ref 6–20)
BUN/CREAT SERPL: 23 (ref 12–20)
CALCIUM SERPL-MCNC: 9.1 MG/DL (ref 8.5–10.1)
CHLORIDE SERPL-SCNC: 110 MMOL/L (ref 97–108)
CHOLEST SERPL-MCNC: 179 MG/DL
CO2 SERPL-SCNC: 27 MMOL/L (ref 21–32)
CREAT SERPL-MCNC: 1.11 MG/DL (ref 0.7–1.3)
ERYTHROCYTE [DISTWIDTH] IN BLOOD BY AUTOMATED COUNT: 13.2 % (ref 11.5–14.5)
GLOBULIN SER CALC-MCNC: 3.5 G/DL (ref 2–4)
GLUCOSE SERPL-MCNC: 90 MG/DL (ref 65–100)
HCT VFR BLD AUTO: 42.2 % (ref 36.6–50.3)
HDLC SERPL-MCNC: 54 MG/DL
HDLC SERPL: 3.3 (ref 0–5)
HGB BLD-MCNC: 13.6 G/DL (ref 12.1–17)
LDLC SERPL CALC-MCNC: 111.6 MG/DL (ref 0–100)
MCH RBC QN AUTO: 29.8 PG (ref 26–34)
MCHC RBC AUTO-ENTMCNC: 32.2 G/DL (ref 30–36.5)
MCV RBC AUTO: 92.5 FL (ref 80–99)
NRBC # BLD: 0 K/UL (ref 0–0.01)
NRBC BLD-RTO: 0 PER 100 WBC
PLATELET # BLD AUTO: 205 K/UL (ref 150–400)
PMV BLD AUTO: 10.6 FL (ref 8.9–12.9)
POTASSIUM SERPL-SCNC: 4.7 MMOL/L (ref 3.5–5.1)
PROT SERPL-MCNC: 7.4 G/DL (ref 6.4–8.2)
RBC # BLD AUTO: 4.56 M/UL (ref 4.1–5.7)
SODIUM SERPL-SCNC: 140 MMOL/L (ref 136–145)
TRIGL SERPL-MCNC: 67 MG/DL
VLDLC SERPL CALC-MCNC: 13.4 MG/DL
WBC # BLD AUTO: 6.5 K/UL (ref 4.1–11.1)

## 2024-01-19 PROCEDURE — G0439 PPPS, SUBSEQ VISIT: HCPCS | Performed by: INTERNAL MEDICINE

## 2024-01-19 PROCEDURE — 1123F ACP DISCUSS/DSCN MKR DOCD: CPT | Performed by: INTERNAL MEDICINE

## 2024-01-19 PROCEDURE — G8484 FLU IMMUNIZE NO ADMIN: HCPCS | Performed by: INTERNAL MEDICINE

## 2024-01-19 RX ORDER — ATORVASTATIN CALCIUM 10 MG/1
10 TABLET, FILM COATED ORAL DAILY
Qty: 30 TABLET | Refills: 1 | Status: SHIPPED | OUTPATIENT
Start: 2024-01-19

## 2024-01-19 RX ORDER — SULFAMETHOXAZOLE AND TRIMETHOPRIM 800; 160 MG/1; MG/1
1 TABLET ORAL 2 TIMES DAILY
COMMUNITY
Start: 2024-01-02

## 2024-01-19 SDOH — ECONOMIC STABILITY: INCOME INSECURITY: HOW HARD IS IT FOR YOU TO PAY FOR THE VERY BASICS LIKE FOOD, HOUSING, MEDICAL CARE, AND HEATING?: NOT HARD AT ALL

## 2024-01-19 SDOH — ECONOMIC STABILITY: FOOD INSECURITY: WITHIN THE PAST 12 MONTHS, YOU WORRIED THAT YOUR FOOD WOULD RUN OUT BEFORE YOU GOT MONEY TO BUY MORE.: NEVER TRUE

## 2024-01-19 SDOH — ECONOMIC STABILITY: FOOD INSECURITY: WITHIN THE PAST 12 MONTHS, THE FOOD YOU BOUGHT JUST DIDN'T LAST AND YOU DIDN'T HAVE MONEY TO GET MORE.: NEVER TRUE

## 2024-01-19 ASSESSMENT — ENCOUNTER SYMPTOMS
BLOOD IN STOOL: 0
CONSTIPATION: 0
COUGH: 0
ABDOMINAL PAIN: 0
SHORTNESS OF BREATH: 0
VOMITING: 0
DIARRHEA: 0
NAUSEA: 0

## 2024-01-19 ASSESSMENT — LIFESTYLE VARIABLES
HOW OFTEN DO YOU HAVE A DRINK CONTAINING ALCOHOL: MONTHLY OR LESS
HOW MANY STANDARD DRINKS CONTAINING ALCOHOL DO YOU HAVE ON A TYPICAL DAY: PATIENT DOES NOT DRINK

## 2024-01-19 NOTE — PATIENT INSTRUCTIONS
Patient Education      Preventing Falls: Care Instructions  Injuries and health problems such as trouble walking or poor eyesight can increase your risk of falling. So can some medicines. But there are things you can do to help prevent falls. You can exercise to get stronger. You can also arrange your home to make it safer.    Talk to your doctor about the medicines you take. Ask if any of them increase the risk of falls and whether they can be changed or stopped.   Try to exercise regularly. It can help improve your strength and balance. This can help lower your risk of falling.     Practice fall safety and prevention.    Wear low-heeled shoes that fit well and give your feet good support. Talk to your doctor if you have foot problems that make this hard.  Carry a cellphone or wear a medical alert device that you can use to call for help.  Use stepladders instead of chairs to reach high objects. Don't climb if you're at risk for falls. Ask for help, if needed.  Wear the correct eyeglasses, if you need them.    Make your home safer.    Remove rugs, cords, clutter, and furniture from walkways.  Keep your house well lit. Use night-lights in hallways and bathrooms.  Install and use sturdy handrails on stairways.  Wear nonskid footwear, even inside. Don't walk barefoot or in socks without shoes.    Be safe outside.    Use handrails, curb cuts, and ramps whenever possible.  Keep your hands free by using a shoulder bag or backpack.  Try to walk in well-lit areas. Watch out for uneven ground, changes in pavement, and debris.  Be careful in the winter. Walk on the grass or gravel when sidewalks are slippery. Use de-icer on steps and walkways. Add non-slip devices to shoes.    Put grab bars and nonskid mats in your shower or tub and near the toilet. Try to use a shower chair or bath bench when bathing.   Get into a tub or shower by putting in your weaker leg first. Get out with your strong side first. Have a phone or medical

## 2024-01-19 NOTE — ASSESSMENT & PLAN NOTE
well controlled, asymptomatic.  BP is well controlled, lipids are above goal.   Continue: current plan pending review of labs.  He didn't tolerate Pravastatin so will try a different statin as a trial

## 2024-01-19 NOTE — ACP (ADVANCE CARE PLANNING)
Advance Care Planning   Advance Care Planning (ACP) Physician/NP/PA (Provider) Conversation    Date of ACP Conversation: 01/19/24  Persons included in Conversation:  patient  Length of ACP Conversation in minutes: <16 minutes (Non-Billable)    Has ACP document(s) on file - reflects the patient's care preferences.  He elects Full Code (Attempt Resuscitation)    The patient has appointed the following active healthcare agents:    Primary Decision Maker: Gloria Escobar - [facility] - [phone]     The Patient has the following current code status:    Code Status: Full Code    James Gunderson MD
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
## 2024-01-19 NOTE — PROGRESS NOTES
Medicare Annual Wellness Visit    Danial Rico is here for Medicare AWV    Assessment & Plan   Medicare annual wellness visit, subsequent  Advanced care planning/counseling discussion  -     FULL CODE  History of cerebellar stroke  Assessment & Plan:  well controlled, asymptomatic.  BP is well controlled, lipids are above goal.   Continue: current plan pending review of labs.  He didn't tolerate Pravastatin so will try a different statin as a trial  Orders:  -     Comprehensive Metabolic Panel; Future  -     CBC; Future  -     Lipid Panel; Future  -     atorvastatin (LIPITOR) 10 MG tablet; Take 1 tablet by mouth daily, Disp-30 tablet, R-1Normal  Pure hypercholesterolemia  Comments:  reviewed ideal goal, will repeat labs and try a different statin  Orders:  -     Comprehensive Metabolic Panel; Future  -     Lipid Panel; Future  -     atorvastatin (LIPITOR) 10 MG tablet; Take 1 tablet by mouth daily, Disp-30 tablet, R-1Normal  Male pattern baldness  Assessment & Plan:  stable, continue current treatment   Lymphedema of right lower extremity  Assessment & Plan:  Improved, still slightly worse in the evening, will monitor, recommended compression sock and when to consider lymphedema clinic .  He reports imaging of pelvic and thigh ordered by [facility] and will f/u on those  Age-related cataract of both eyes, unspecified age-related cataract type  Comments:  new dx to me, chronic issue, is worsening. Sounds like it is getting to the point that he needs surgery. He will d/w surgeon     Recommendations for Preventive Services Due: see orders and patient instructions/AVS.  Recommended screening schedule for the next 5-10 years is provided to the patient in written form: see Patient Instructions/AVS.     Return in 1 year (on 1/19/2025) for FULL PHYSICAL.       Subjective   Since last visit: no changes.    See ACP Note for Advanced Care Directive Discussion    Health Maintenance  Immunizations:   COVID: up to date  Influenza: up

## 2024-01-21 ASSESSMENT — SLEEP AND FATIGUE QUESTIONNAIRES
HOW LIKELY ARE YOU TO NOD OFF OR FALL ASLEEP WHILE WATCHING TV: 3
DO YOU HAVE DIFFICULTY OPERATING A MOTOR VEHICLE FOR SHORT DISTANCES (LESS THAN 100 MILES) BECAUSE YOU BECOME SLEEPY: YES, A LITTLE
DO YOU HAVE DIFFICULTY BEING AS ACTIVE AS YOU WANT TO BE IN THE MORNING BECAUSE YOU ARE SLEEPY OR TIRED: NO
HOW LIKELY ARE YOU TO NOD OFF OR FALL ASLEEP WHILE SITTING QUIETLY AFTER LUNCH WITHOUT ALCOHOL: 0
HOW LIKELY ARE YOU TO NOD OFF OR FALL ASLEEP WHILE SITTING INACTIVE IN A PUBLIC PLACE: SLIGHT CHANCE OF DOZING
DO YOU HAVE DIFFICULTY CONCENTRATING ON THE THINGS YOU DO BECAUSE YOU ARE SLEEPY OR TIRED: NO
HOW LIKELY ARE YOU TO NOD OFF OR FALL ASLEEP WHILE SITTING AND READING: 1
FOSQ SCORE: 16
HOW LIKELY ARE YOU TO NOD OFF OR FALL ASLEEP IN A CAR, WHILE STOPPED FOR A FEW MINUTES IN TRAFFIC: WOULD NEVER DOZE
HOW LIKELY ARE YOU TO NOD OFF OR FALL ASLEEP WHEN YOU ARE A PASSENGER IN A CAR FOR AN HOUR WITHOUT A BREAK: MODERATE CHANCE OF DOZING
HOW LIKELY ARE YOU TO NOD OFF OR FALL ASLEEP WHILE WATCHING TV: HIGH CHANCE OF DOZING
HAS YOUR RELATIONSHIP WITH FAMILY, FRIENDS OR WORK COLLEAGUES BEEN AFFECTED BECAUSE YOU ARE SLEEPY OR TIRED: NO
HOW LIKELY ARE YOU TO NOD OFF OR FALL ASLEEP IN A CAR, WHILE STOPPED FOR A FEW MINUTES IN TRAFFIC: 0
HOW LIKELY ARE YOU TO NOD OFF OR FALL ASLEEP WHEN YOU ARE A PASSENGER IN A CAR FOR AN HOUR WITHOUT A BREAK: 2
ESS TOTAL SCORE: 10
DO YOU GENERALLY HAVE DIFFICULTY REMEMBERING THINGS BECAUSE YOU ARE SLEEPY OR TIRED: YES, A LITTLE
HAS YOUR MOOD BEEN AFFECTED BECAUSE YOU ARE SLEEPY OR TIRED: NO
DO YOU HAVE DIFFICULTY VISITING YOUR FAMILY OR FRIENDS IN THEIR HOME BECAUSE YOU BECOME SLEEPY OR TIRED: NO
DO YOU HAVE DIFFICULTY BEING AS ACTIVE AS YOU WANT TO BE IN THE EVENING BECAUSE YOU ARE SLEEPY OR TIRED: YES, MODERATE
HOW LIKELY ARE YOU TO NOD OFF OR FALL ASLEEP WHILE LYING DOWN TO REST IN THE AFTERNOON WHEN CIRCUMSTANCES PERMIT: HIGH CHANCE OF DOZING
DO YOU HAVE DIFFICULTY OPERATING A MOTOR VEHICLE FOR LONG DISTANCES (GREATER THAN 100 MILES) BECAUSE YOU BECOME SLEEPY: YES, MODERATE
HOW LIKELY ARE YOU TO NOD OFF OR FALL ASLEEP WHILE SITTING AND READING: SLIGHT CHANCE OF DOZING
HOW LIKELY ARE YOU TO NOD OFF OR FALL ASLEEP WHILE SITTING INACTIVE IN A PUBLIC PLACE: 1
HOW LIKELY ARE YOU TO NOD OFF OR FALL ASLEEP WHILE SITTING AND TALKING TO SOMEONE: WOULD NEVER DOZE
HOW LIKELY ARE YOU TO NOD OFF OR FALL ASLEEP WHILE SITTING AND TALKING TO SOMEONE: 0
HOW LIKELY ARE YOU TO NOD OFF OR FALL ASLEEP WHILE LYING DOWN TO REST IN THE AFTERNOON WHEN CIRCUMSTANCES PERMIT: 3
DO YOU HAVE DIFFICULTY WATCHING A MOVIE OR VIDEO BECAUSE YOU BECOME SLEEPY OR TIRED: YES, MODERATE
HOW LIKELY ARE YOU TO NOD OFF OR FALL ASLEEP WHILE SITTING QUIETLY AFTER LUNCH WITHOUT ALCOHOL: WOULD NEVER DOZE

## 2024-01-22 NOTE — RESULT ENCOUNTER NOTE
Results released to patient via [employer].  All labs are stable or at goal for him except for LDL above goal.  Off statin and started a new one today.  Slight increase in Cr is likely due to bactrim.

## 2024-01-24 ENCOUNTER — OFFICE VISIT (OUTPATIENT)
Age: 79
End: 2024-01-24
Payer: MEDICARE

## 2024-01-24 VITALS
DIASTOLIC BLOOD PRESSURE: 62 MMHG | BODY MASS INDEX: 27.12 KG/M2 | OXYGEN SATURATION: 97 % | SYSTOLIC BLOOD PRESSURE: 109 MMHG | TEMPERATURE: 98.2 F | HEIGHT: 71 IN | WEIGHT: 193.7 LBS | HEART RATE: 80 BPM

## 2024-01-24 DIAGNOSIS — G47.33 OBSTRUCTIVE SLEEP APNEA (ADULT) (PEDIATRIC): Primary | ICD-10-CM

## 2024-01-24 PROCEDURE — G8484 FLU IMMUNIZE NO ADMIN: HCPCS | Performed by: INTERNAL MEDICINE

## 2024-01-24 PROCEDURE — G8427 DOCREV CUR MEDS BY ELIG CLIN: HCPCS | Performed by: INTERNAL MEDICINE

## 2024-01-24 PROCEDURE — 1123F ACP DISCUSS/DSCN MKR DOCD: CPT | Performed by: INTERNAL MEDICINE

## 2024-01-24 PROCEDURE — 99213 OFFICE O/P EST LOW 20 MIN: CPT | Performed by: INTERNAL MEDICINE

## 2024-01-24 PROCEDURE — G8419 CALC BMI OUT NRM PARAM NOF/U: HCPCS | Performed by: INTERNAL MEDICINE

## 2024-01-24 PROCEDURE — 1036F TOBACCO NON-USER: CPT | Performed by: INTERNAL MEDICINE

## 2024-01-24 NOTE — PROGRESS NOTES
[address]  [place] [address]  Tel.  [phone]  Fax. [phone] [address]  [place] [address]  Tel.  [phone]  Fax. [phone] [address] [place] [address]  Tel.  [phone]  Fax. [phone]     S>Danial Rico is a 78 y.o. male seen for a positive airway pressure follow-up.  He reports no problems using the device.  The following problems are identified:    Drowsiness no Problems exhaling no   Snoring no Forget to put on no   Mask Comfortable Yes but leaving marks on face that are bothering him and don't go away Can't fall asleep no   Dry Mouth no Mask falls off no   Air Leaking no Frequent awakenings no     Estimated AHI flow from download shows 3/hour.   no significant leak  Averaging 7.5/hours use on nights used  Days with usage 100%  Adherence 97%  Weight from last visit 190 lb      He admits that his sleep has improved.    Allergies   Allergen Reactions    Terbinafine Other (See Comments)     STOMACH ACHE- No true issues per patient  Patient asks for allergy removal       He has a current medication list which includes the following prescription(s): sulfamethoxazole-trimethoprim, atorvastatin, aspirin, pantoprazole, diclofenac sodium, econazole nitrate, finasteride, and fluocinonide..      He  has a past medical history of Cholelithiasis, Edema, GERD (gastroesophageal reflux disease), Painful ejaculation, Pure hypercholesterolemia, Seminoma of right testis, stage 1 (HCC), Testicular cancer (HCC), Tingling, Tinnitus of left ear, and Unspecified adverse effect of anesthesia.        1/21/2024     8:22 AM 1/4/2023     3:29 PM 11/29/2022     8:58 AM 7/28/2022    10:00 AM   Sleep Medicine   Sitting and reading 1 0 1 0   Watching TV 3 3 2 0   Sitting, inactive in a public place (e.g. a theatre or a meeting) 1 0 1 0   As a passenger in a car for an hour without a break 2 2 3 3   Lying down to rest in the afternoon when circumstances

## 2024-01-24 NOTE — PATIENT INSTRUCTIONS
[address]  [place] [address]  Tel.  [phone]  Fax. [phone] [address]  [place] [address]  Tel.  [phone]  Fax. [phone] [address] [place] [address]  Tel.  [phone]  Fax. [phone]     PROPER SLEEP HYGIENE    What to avoid  Do not have drinks with caffeine, such as coffee or black tea, for 8 hours before bed.  Do not smoke or use other types of tobacco near bedtime. Nicotine is a stimulant and can keep you awake.  Avoid drinking alcohol late in the evening, because it can cause you to wake in the middle of the night.  Do not eat a big meal close to bedtime. If you are hungry, eat a light snack.  Do not drink a lot of water close to bedtime, because the need to urinate may wake you up during the night.  Do not read or watch TV in bed. Use the bed only for sleeping and sexual activity.  What to try  Go to bed at the same time every night, and wake up at the same time every morning. Do not take naps during the day.  Keep your bedroom quiet, dark, and cool.  Get regular exercise, but not within 3 to 4 hours of your bedtime..  Sleep on a comfortable pillow and mattress.  If watching the clock makes you anxious, turn it facing away from you so you cannot see the time.  If you worry when you lie down, start a worry book. Well before bedtime, write down your worries, and then set the book and your concerns aside.  Try meditation or other relaxation techniques before you go to bed.  If you cannot fall asleep, get up and go to another room until you feel sleepy. Do something relaxing. Repeat your bedtime routine before you go to bed again.  Make your house quiet and calm about an hour before bedtime. Turn down the lights, turn off the TV, log off the computer, and turn down the volume on music. This can help you relax after a busy day.    Drowsy Driving  The [place] National Highway Traffic Safety Administration cites drowsiness as a

## 2024-01-25 ENCOUNTER — CLINICAL DOCUMENTATION (OUTPATIENT)
Age: 79
End: 2024-01-25

## 2024-01-25 NOTE — PROGRESS NOTES
PAP Supply order faxed to [place]. Two prescription orders faxed as one order includes a prescription for a chinstrap and the other orders a mask evaluation. Referral to ENT faxed to Dr. Juan Hinds per Dr. Yañez order.

## 2024-02-05 ENCOUNTER — TELEPHONE (OUTPATIENT)
Age: 79
End: 2024-02-05

## 2024-03-08 RX ORDER — FINASTERIDE 5 MG/1
2.5 TABLET, FILM COATED ORAL DAILY
Qty: 90 TABLET | Refills: 1 | Status: SHIPPED | OUTPATIENT
Start: 2024-03-08

## 2024-03-08 NOTE — TELEPHONE ENCOUNTER
Future Appointments   Date Time Provider Department Center   3/13/2024  2:00 PM Gracie Valles PA-C [facility] BS AMB   3/29/2024 10:30 AM Sarah Whitt MD [facility] BS AMB   1/20/2025  8:20 AM James Gunderson MD [facility] AMB   1/22/2025  9:10 AM Lorena Yañez MD [facility] BS AMB

## 2024-03-21 ENCOUNTER — PATIENT MESSAGE (OUTPATIENT)
Age: 79
End: 2024-03-21

## 2024-03-25 NOTE — TELEPHONE ENCOUNTER
Concerned with oral dryness    Tech to increase humidity setting and  patient regarding humidity setting and ensure water tank not running empty. He can continue biotene as well as ensuring adequate fluid intake throughout the day.    His PAP settings are fairly low which can help minimize oral dryness.    Some people also add an in room humidifier to increase humidity in air to lessen dryness.  If it persists despite efforts above, then we can consider lowering PAP pressure     (Can consider APAP 7-8 cmH20)

## 2024-03-27 ENCOUNTER — TELEPHONE (OUTPATIENT)
Age: 79
End: 2024-03-27

## 2024-03-27 ENCOUNTER — OFFICE VISIT (OUTPATIENT)
Age: 79
End: 2024-03-27
Payer: MEDICARE

## 2024-03-27 VITALS
SYSTOLIC BLOOD PRESSURE: 130 MMHG | BODY MASS INDEX: 26.52 KG/M2 | OXYGEN SATURATION: 97 % | HEART RATE: 75 BPM | TEMPERATURE: 97.5 F | RESPIRATION RATE: 18 BRPM | DIASTOLIC BLOOD PRESSURE: 74 MMHG | WEIGHT: 195.8 LBS | HEIGHT: 72 IN

## 2024-03-27 DIAGNOSIS — M25.561 CHRONIC PAIN OF RIGHT KNEE: ICD-10-CM

## 2024-03-27 DIAGNOSIS — G89.29 CHRONIC PAIN OF RIGHT KNEE: ICD-10-CM

## 2024-03-27 DIAGNOSIS — M51.16 LUMBAR DISC DISEASE WITH RADICULOPATHY: ICD-10-CM

## 2024-03-27 DIAGNOSIS — M16.11 ARTHRITIS OF RIGHT HIP: Primary | ICD-10-CM

## 2024-03-27 PROCEDURE — 1123F ACP DISCUSS/DSCN MKR DOCD: CPT | Performed by: INTERNAL MEDICINE

## 2024-03-27 PROCEDURE — G8484 FLU IMMUNIZE NO ADMIN: HCPCS | Performed by: INTERNAL MEDICINE

## 2024-03-27 PROCEDURE — G8419 CALC BMI OUT NRM PARAM NOF/U: HCPCS | Performed by: INTERNAL MEDICINE

## 2024-03-27 PROCEDURE — G8427 DOCREV CUR MEDS BY ELIG CLIN: HCPCS | Performed by: INTERNAL MEDICINE

## 2024-03-27 PROCEDURE — 1036F TOBACCO NON-USER: CPT | Performed by: INTERNAL MEDICINE

## 2024-03-27 PROCEDURE — 99214 OFFICE O/P EST MOD 30 MIN: CPT | Performed by: INTERNAL MEDICINE

## 2024-03-27 RX ORDER — MELOXICAM 7.5 MG/1
7.5 TABLET ORAL DAILY
Qty: 30 TABLET | Refills: 1 | Status: SHIPPED | OUTPATIENT
Start: 2024-03-27

## 2024-03-27 ASSESSMENT — PATIENT HEALTH QUESTIONNAIRE - PHQ9
SUM OF ALL RESPONSES TO PHQ QUESTIONS 1-9: 0
SUM OF ALL RESPONSES TO PHQ9 QUESTIONS 1 & 2: 0
SUM OF ALL RESPONSES TO PHQ QUESTIONS 1-9: 0
SUM OF ALL RESPONSES TO PHQ QUESTIONS 1-9: 0
2. FEELING DOWN, DEPRESSED OR HOPELESS: NOT AT ALL
1. LITTLE INTEREST OR PLEASURE IN DOING THINGS: NOT AT ALL
SUM OF ALL RESPONSES TO PHQ QUESTIONS 1-9: 0

## 2024-03-27 ASSESSMENT — ENCOUNTER SYMPTOMS
ABDOMINAL PAIN: 0
SHORTNESS OF BREATH: 0
COUGH: 0
BACK PAIN: 1

## 2024-03-27 NOTE — TELEPHONE ENCOUNTER
Patient is calling in to be scheduled with Dr. Arroyo for right side numbness and pain primarily in/around his right leg.    PSR has scheduled the patient and placed him on the wait list for Dr. Arroyo.    PSR has messaged the nurse and is waiting to see if he can be scheduled with the nurse practitioner so that he can be seen sooner.    PSR will call the patient back if the nurse practitioner is able to see him.
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
## 2024-03-27 NOTE — TELEPHONE ENCOUNTER
Patient is calling back to confirm that he will be at his appointment tomorrow with the nurse practitioner.

## 2024-03-27 NOTE — TELEPHONE ENCOUNTER
Chart reviewed.  I have seen him in January.  He has been to several orthopedic surgeon's since then.  He has not contacted me (via phone or MyChart) regarding any of these concerns.  He did see Dr Allred today.  I spoke to her after her visit with him.  He has not follow up with me at this time until Jan '25.  Will need a sooner appointment, with me not a partner, if things continue.      Future Appointments   Date Time Provider Department Center   3/28/2024 11:00 AM Lux Werner APRN - NP [facility] BS AMB   3/29/2024 10:30 AM Sarah Whitt MD [facility] BS AMB   4/3/2024 11:45 AM Gracie Valles PA-C [facility] BS AMB   10/16/2024  8:00 AM Nehal Arroyo DO [facility] BS AMB   1/20/2025  8:20 AM James Gunderson MD [facility] BS AMB   1/22/2025  9:10 AM Lorena Yañez MD [facility] BS AMB

## 2024-03-27 NOTE — TELEPHONE ENCOUNTER
This patient called this morning and wanted to see Dr. Gunderson today but he was full, so I scheduled him with Dr. Allred.  Patient stated he \"is tempted to fly somewhere and check himself into the [facility] or somewhere that has coordinated care because none of the specialists he has seen have consulted with each other to figure out his problem\".  Pt stated the cramping in his right leg last night was \"profoundly debilitating\" -- he was about to go to the ER it was so bad.

## 2024-03-27 NOTE — TELEPHONE ENCOUNTER
NP confirmed that she can see this patient. Called patient twice. No answer. Left a VM stating I can schedule him tomorrow at 11AM. Stated that if he can take the appointment then he does not need to call back but if he can not take the appointment, call back so we can reschedule.

## 2024-03-28 DIAGNOSIS — Z86.73 HISTORY OF CEREBELLAR STROKE: ICD-10-CM

## 2024-03-28 DIAGNOSIS — E78.00 PURE HYPERCHOLESTEROLEMIA: ICD-10-CM

## 2024-03-28 RX ORDER — ATORVASTATIN CALCIUM 10 MG/1
10 TABLET, FILM COATED ORAL DAILY
Qty: 30 TABLET | Refills: 5 | Status: SHIPPED | OUTPATIENT
Start: 2024-03-28

## 2024-03-28 NOTE — TELEPHONE ENCOUNTER
Chief Complaint   Patient presents with    Medication Refill     Last Appointment with Dr. Gunderson:  1/19/2024   Future Appointments   Date Time Provider Department Center   3/29/2024 10:30 AM Sarah Whitt MD [facility] BS AMB   4/3/2024 11:45 AM Gracie Valles PA-C [facility] BS AMB   5/22/2024 11:00 AM Lux Werner APRN - NP [facility] BS AMB   10/16/2024  8:00 AM Nehal Arroyo DO [facility] BS AMB   1/20/2025  8:20 AM James Gunderson MD [facility] BS AMB   1/22/2025  9:10 AM Lorena Yañez MD [facility] BS AMB

## 2024-03-29 ENCOUNTER — OFFICE VISIT (OUTPATIENT)
Age: 79
End: 2024-03-29
Payer: MEDICARE

## 2024-03-29 VITALS
DIASTOLIC BLOOD PRESSURE: 64 MMHG | HEART RATE: 74 BPM | OXYGEN SATURATION: 97 % | RESPIRATION RATE: 16 BRPM | SYSTOLIC BLOOD PRESSURE: 122 MMHG | HEIGHT: 72 IN | BODY MASS INDEX: 26.41 KG/M2 | WEIGHT: 195 LBS

## 2024-03-29 DIAGNOSIS — G47.33 OSA ON CPAP: Primary | ICD-10-CM

## 2024-03-29 DIAGNOSIS — G47.39 MIXED SLEEP APNEA: ICD-10-CM

## 2024-03-29 PROCEDURE — 1123F ACP DISCUSS/DSCN MKR DOCD: CPT | Performed by: OTOLARYNGOLOGY

## 2024-03-29 PROCEDURE — 1036F TOBACCO NON-USER: CPT | Performed by: OTOLARYNGOLOGY

## 2024-03-29 PROCEDURE — G8419 CALC BMI OUT NRM PARAM NOF/U: HCPCS | Performed by: OTOLARYNGOLOGY

## 2024-03-29 PROCEDURE — G8484 FLU IMMUNIZE NO ADMIN: HCPCS | Performed by: OTOLARYNGOLOGY

## 2024-03-29 PROCEDURE — 99204 OFFICE O/P NEW MOD 45 MIN: CPT | Performed by: OTOLARYNGOLOGY

## 2024-03-29 PROCEDURE — G8427 DOCREV CUR MEDS BY ELIG CLIN: HCPCS | Performed by: OTOLARYNGOLOGY

## 2024-04-01 NOTE — PROGRESS NOTES
ORTHOPEDIC SURGERY Left 2000    bicept tendon repair    ORTHOPEDIC SURGERY Right 2005    bicept tendon repair    OTHER SURGICAL HISTORY Bilateral 04/2019    Tenotomy (surgery on 5th toe, tendon severed due to curvature)    OTHER SURGICAL HISTORY  01/17/2024    dental implant (tooth #13)    TESTICLE REMOVAL Right 1984    h/o seminoma    UPPER GASTROINTESTINAL ENDOSCOPY N/A 07/25/2023    ESOPHAGOGASTRODUODENOSCOPY performed by Mary Lou Acuña MD at [facility] ENDOSCOPY    UPPER GASTROINTESTINAL ENDOSCOPY N/A 07/25/2023    EGD BIOPSY performed by Mary Lou Acuña MD at [facility] ENDOSCOPY       Medications:   Current Outpatient Medications   Medication Instructions    aspirin 81 mg, Oral, DAILY    atorvastatin (LIPITOR) 10 mg, Oral, DAILY    diclofenac sodium (VOLTAREN) 1 % GEL Topical    econazole nitrate 1 % cream Apply to effected as needed twice a day.    finasteride (PROSCAR) 2.5 mg, Oral, DAILY    fluocinonide (LIDEX) 0.05 % ointment Topical, 2 TIMES DAILY    meloxicam (MOBIC) 7.5 mg, Oral, DAILY    methylPREDNISolone (MEDROL DOSEPACK) 4 MG tablet Per dose pack instructions    pantoprazole (PROTONIX) 40 mg, Oral, DAILY BEFORE BREAKFAST       Allergies:   Allergies   Allergen Reactions    Terbinafine Other (See Comments)     STOMACH ACHE- No true issues per patient  Patient asks for allergy removal       Social History:   Social History     Tobacco Use    Smoking status: Never     Passive exposure: Never    Smokeless tobacco: Never   Vaping Use    Vaping Use: Never used   Substance Use Topics    Alcohol use: Yes     Alcohol/week: 1.0 standard drink of alcohol     Types: 1 Glasses of wine per week    Drug use: No        Family History:  Family History   Problem Relation Age of Onset    Atrial Fibrillation Sister     Hearing Impairment Sister     Hearing Impairment Brother     Heart Attack Father     Other Mother         CONFUSION AFTER HIP SURG AT AGE 90    Osteoarthritis Mother     Breast Cancer Sister        Review of

## 2024-04-11 ENCOUNTER — TRANSCRIBE ORDERS (OUTPATIENT)
Facility: HOSPITAL | Age: 79
End: 2024-04-11

## 2024-04-11 DIAGNOSIS — M54.17 LUMBOSACRAL RADICULOPATHY: Primary | ICD-10-CM

## 2024-04-12 ENCOUNTER — HOSPITAL ENCOUNTER (OUTPATIENT)
Age: 79
End: 2024-04-12
Payer: MEDICARE

## 2024-04-12 DIAGNOSIS — M54.17 LUMBOSACRAL RADICULOPATHY: ICD-10-CM

## 2024-04-12 PROCEDURE — 72148 MRI LUMBAR SPINE W/O DYE: CPT

## 2024-04-15 ENCOUNTER — OFFICE VISIT (OUTPATIENT)
Age: 79
End: 2024-04-15
Payer: MEDICARE

## 2024-04-15 VITALS
OXYGEN SATURATION: 98 % | RESPIRATION RATE: 16 BRPM | TEMPERATURE: 97.9 F | SYSTOLIC BLOOD PRESSURE: 127 MMHG | HEIGHT: 72 IN | BODY MASS INDEX: 26.3 KG/M2 | DIASTOLIC BLOOD PRESSURE: 77 MMHG | WEIGHT: 194.2 LBS | HEART RATE: 80 BPM

## 2024-04-15 DIAGNOSIS — M54.16 LUMBAR RADICULOPATHY, RIGHT: ICD-10-CM

## 2024-04-15 DIAGNOSIS — E55.9 VITAMIN D DEFICIENCY: Primary | ICD-10-CM

## 2024-04-15 DIAGNOSIS — M85.9 LOW BONE DENSITY: ICD-10-CM

## 2024-04-15 DIAGNOSIS — G89.29 CHRONIC PAIN OF RIGHT KNEE: ICD-10-CM

## 2024-04-15 DIAGNOSIS — M25.561 CHRONIC PAIN OF RIGHT KNEE: ICD-10-CM

## 2024-04-15 PROCEDURE — 1036F TOBACCO NON-USER: CPT | Performed by: INTERNAL MEDICINE

## 2024-04-15 PROCEDURE — G8427 DOCREV CUR MEDS BY ELIG CLIN: HCPCS | Performed by: INTERNAL MEDICINE

## 2024-04-15 PROCEDURE — 99215 OFFICE O/P EST HI 40 MIN: CPT | Performed by: INTERNAL MEDICINE

## 2024-04-15 PROCEDURE — 1123F ACP DISCUSS/DSCN MKR DOCD: CPT | Performed by: INTERNAL MEDICINE

## 2024-04-15 PROCEDURE — G8419 CALC BMI OUT NRM PARAM NOF/U: HCPCS | Performed by: INTERNAL MEDICINE

## 2024-04-15 RX ORDER — GABAPENTIN 300 MG/1
300 CAPSULE ORAL 3 TIMES DAILY
Qty: 30 CAPSULE | Refills: 0 | Status: SHIPPED | OUTPATIENT
Start: 2024-04-15 | End: 2024-04-18 | Stop reason: DRUGHIGH

## 2024-04-15 RX ORDER — ERGOCALCIFEROL 1.25 MG/1
50000 CAPSULE ORAL WEEKLY
COMMUNITY
Start: 2024-04-05 | End: 2024-06-10

## 2024-04-15 ASSESSMENT — ENCOUNTER SYMPTOMS
VOMITING: 0
SHORTNESS OF BREATH: 0
ABDOMINAL PAIN: 0
CONSTIPATION: 0
DIARRHEA: 0
NAUSEA: 0
COUGH: 0

## 2024-04-15 ASSESSMENT — PATIENT HEALTH QUESTIONNAIRE - PHQ9
SUM OF ALL RESPONSES TO PHQ QUESTIONS 1-9: 0
2. FEELING DOWN, DEPRESSED OR HOPELESS: NOT AT ALL
SUM OF ALL RESPONSES TO PHQ QUESTIONS 1-9: 0
SUM OF ALL RESPONSES TO PHQ9 QUESTIONS 1 & 2: 0
1. LITTLE INTEREST OR PLEASURE IN DOING THINGS: NOT AT ALL

## 2024-04-15 NOTE — PROGRESS NOTES
Danial Rico is a 79 y.o. male who was seen in clinic today (4/15/2024).    Assessment & Plan:   Below is the assessment and plan developed based on review of pertinent history, physical exam, labs, studies, and medications.    1. Vitamin D deficiency  Assessment & Plan:  New dx, labs done at [facility] and will have scanned into his chart.  Agree w/ high dose supplement.  He will d/w [facility] provider if she wants to monitor this or have me do it   2. Low bone density  Assessment & Plan:  New dx, we will start with calcium in his diet and start on vitamin D supplement.  Will research about when to consider medications (Fosamax).   3. Lumbar radiculopathy, right  Assessment & Plan:  this is a chronic problem, symptoms are not improving, differential dx reviewed with the patient, and sounds like L4 radiculopathy.  MRI without an obvious cause.  Reviewed unlikely anything for pain specialist to do but he wants to have Dr Payton or Long review his imaging.  Will have him talk to surgeon, Dr Renteria at [facility].  We did discuss medications and will try Gabapentin as a trial.  Orders:  -     gabapentin (NEURONTIN) 300 MG capsule; Take 1 capsule by mouth 3 times daily for 10 days. Intended supply: 30 days Max Daily Amount: 900 mg, Disp-30 capsule, R-0Normal  -     External Referral To Neurosurgery  -     [facility] - Joni Payton MD, [facility] ([address])  4. Chronic pain of right knee  Comments:  new dx to me, he has seen several specialists, will get MRI to see if this is OA vs meniscus  Orders:  -     MRI KNEE RIGHT WO CONTRAST; Future       On this date 4/15/2024 I have spent 43 minutes reviewing previous notes, test results and face to face with the patient discussing the diagnosis and importance of compliance with the treatment plan as well as documenting on the day of the visit.       Return in about 1 month (around 5/15/2024) for Regular follow up.   Subjective/Objective:   Danial was seen today for Knee Pain

## 2024-04-16 NOTE — ASSESSMENT & PLAN NOTE
New dx, labs done at [facility] and will have scanned into his chart.  Agree w/ high dose supplement.  He will d/w [facility] provider if she wants to monitor this or have me do it

## 2024-04-16 NOTE — ASSESSMENT & PLAN NOTE
New dx, we will start with calcium in his diet and start on vitamin D supplement.  Will research about when to consider medications (Fosamax).

## 2024-04-16 NOTE — ASSESSMENT & PLAN NOTE
this is a chronic problem, symptoms are not improving, differential dx reviewed with the patient, and sounds like L4 radiculopathy.  MRI without an obvious cause.  Reviewed unlikely anything for pain specialist to do but he wants to have Dr Payton or Long review his imaging.  Will have him talk to surgeon, Dr Renteria at [facility].  We did discuss medications and will try Gabapentin as a trial.

## 2024-04-18 ENCOUNTER — HOSPITAL ENCOUNTER (OUTPATIENT)
Age: 79
Discharge: HOME | End: 2024-04-18
Payer: MEDICARE

## 2024-04-18 DIAGNOSIS — M25.561 CHRONIC PAIN OF RIGHT KNEE: ICD-10-CM

## 2024-04-18 DIAGNOSIS — G89.29 CHRONIC PAIN OF RIGHT KNEE: ICD-10-CM

## 2024-04-18 DIAGNOSIS — M54.16 LUMBAR RADICULOPATHY, RIGHT: Primary | ICD-10-CM

## 2024-04-18 PROCEDURE — 73721 MRI JNT OF LWR EXTRE W/O DYE: CPT

## 2024-04-18 RX ORDER — GABAPENTIN 100 MG/1
CAPSULE ORAL
Qty: 30 CAPSULE | Refills: 0 | Status: SHIPPED | OUTPATIENT
Start: 2024-04-18 | End: 2024-04-28

## 2024-05-14 ENCOUNTER — OFFICE VISIT (OUTPATIENT)
Age: 79
End: 2024-05-14
Payer: MEDICARE

## 2024-05-14 VITALS
HEART RATE: 72 BPM | OXYGEN SATURATION: 97 % | HEIGHT: 70 IN | WEIGHT: 193.8 LBS | SYSTOLIC BLOOD PRESSURE: 128 MMHG | TEMPERATURE: 97.3 F | DIASTOLIC BLOOD PRESSURE: 74 MMHG | RESPIRATION RATE: 16 BRPM | BODY MASS INDEX: 27.75 KG/M2

## 2024-05-14 DIAGNOSIS — K57.92 DIVERTICULITIS: Primary | ICD-10-CM

## 2024-05-14 PROCEDURE — G8427 DOCREV CUR MEDS BY ELIG CLIN: HCPCS | Performed by: INTERNAL MEDICINE

## 2024-05-14 PROCEDURE — 1036F TOBACCO NON-USER: CPT | Performed by: INTERNAL MEDICINE

## 2024-05-14 PROCEDURE — 99213 OFFICE O/P EST LOW 20 MIN: CPT | Performed by: INTERNAL MEDICINE

## 2024-05-14 PROCEDURE — 1123F ACP DISCUSS/DSCN MKR DOCD: CPT | Performed by: INTERNAL MEDICINE

## 2024-05-14 PROCEDURE — G8419 CALC BMI OUT NRM PARAM NOF/U: HCPCS | Performed by: INTERNAL MEDICINE

## 2024-05-14 RX ORDER — METRONIDAZOLE 500 MG/1
500 TABLET ORAL 3 TIMES DAILY
Qty: 21 TABLET | Refills: 0 | Status: SHIPPED | OUTPATIENT
Start: 2024-05-14 | End: 2024-05-21

## 2024-05-14 RX ORDER — CIPROFLOXACIN 500 MG/1
500 TABLET, FILM COATED ORAL 2 TIMES DAILY
Qty: 14 TABLET | Refills: 0 | Status: SHIPPED | OUTPATIENT
Start: 2024-05-14 | End: 2024-05-21

## 2024-05-14 ASSESSMENT — PATIENT HEALTH QUESTIONNAIRE - PHQ9
2. FEELING DOWN, DEPRESSED OR HOPELESS: NOT AT ALL
1. LITTLE INTEREST OR PLEASURE IN DOING THINGS: NOT AT ALL
SUM OF ALL RESPONSES TO PHQ9 QUESTIONS 1 & 2: 0
SUM OF ALL RESPONSES TO PHQ QUESTIONS 1-9: 0

## 2024-05-14 NOTE — PROGRESS NOTES
Danial Rico is a 79 y.o. male who was seen in clinic today (5/14/2024) for an acute visit.      Assessment & Plan:   Below is the assessment and plan developed based on review of pertinent history, physical exam, labs, studies, and medications.    1. Diverticulitis  Comments:  new dx, differential reviewed & most likely diagnosis. Will defer imaging at this time and start with meds below. Red flags & expectations reviewed.  Orders:  -     metroNIDAZOLE (FLAGYL) 500 MG tablet; Take 1 tablet by mouth 3 times daily for 7 days, Disp-21 tablet, R-0Normal  -     ciprofloxacin (CIPRO) 500 MG tablet; Take 1 tablet by mouth 2 times daily for 7 days, Disp-14 tablet, R-0Normal     He will update me in 3-5 days if no changes and will need imaging at that time.  Reassured I don't think this has to do with dental procedure or novocain.    He is planning on R knee surgery with Dr Ibrahim in July/August.  He has MRI for L knee scheduled for later this month.  He is asking about getting cataract surgeon done prior to knee surgery but reviewed I think knee is more important and all comes down to timing.    Return if symptoms worsen or fail to improve.   Subjective/Objective:   Danial was seen today for Abdominal Pain    Abdominal Pain  Danial Rico is here to talk about abdominal pain.  This is a new problem and symptoms began 3 days ago and have not changed.  Pain is left/lower abdomen in location and described as aching.  He also reports the following symptoms: constipation.  His symptoms are aggravated by palpation.  He has tried Metamucil.  These have been: not effective.  Previous imaging/procedures include colonoscopy done on 2/26/20 and a CT scan of pelvis on 11/12/23 showing diverticulosis.     A week ago, dental work, had novocain. He felt in a fog for 3 days and medications took 12 hours.      Prior to Admission medications    Medication Sig Start Date End Date Taking? Authorizing Provider   ergocalciferol

## 2024-05-22 ENCOUNTER — TELEPHONE (OUTPATIENT)
Age: 79
End: 2024-05-22

## 2024-05-22 NOTE — TELEPHONE ENCOUNTER
If he is still using Gabapentin then yes.  Otherwise, no he does not.  He will need pre-op prior to surgery (within 30 days of procedure).

## 2024-05-22 NOTE — TELEPHONE ENCOUNTER
Reason for call:  TC from patient. Patient would like to know if he need come in for his appt on Friday for his 1 mo f/u? Patient provided an update on his health. He is having R-Knee surgery on 7/11/2024. He is still taking his Vitamin D, no pain in abdomen and bowls are regular.     Is this a new problem: Yes    Date of last appointment:  5/14/2024     Can we respond via [employer]: No    Best call back number:     Danial Rico (Self) [phone] (Mobile)

## 2024-05-23 ENCOUNTER — HOSPITAL ENCOUNTER (OUTPATIENT)
Age: 79
Discharge: HOME | End: 2024-05-23
Payer: MEDICARE

## 2024-05-23 DIAGNOSIS — S83.207A TEAR OF MENISCUS OF LEFT KNEE, UNSPECIFIED MENISCUS, UNSPECIFIED TEAR TYPE, UNSPECIFIED WHETHER OLD OR CURRENT TEAR: ICD-10-CM

## 2024-05-23 PROCEDURE — 73721 MRI JNT OF LWR EXTRE W/O DYE: CPT

## 2024-06-09 ENCOUNTER — HOSPITAL ENCOUNTER (EMERGENCY)
Facility: HOSPITAL | Age: 79
Discharge: HOME | End: 2024-06-09
Attending: EMERGENCY MEDICINE
Payer: MEDICARE

## 2024-06-09 ENCOUNTER — APPOINTMENT (OUTPATIENT)
Facility: HOSPITAL | Age: 79
End: 2024-06-09
Payer: MEDICARE

## 2024-06-09 VITALS
RESPIRATION RATE: 18 BRPM | OXYGEN SATURATION: 96 % | SYSTOLIC BLOOD PRESSURE: 133 MMHG | DIASTOLIC BLOOD PRESSURE: 78 MMHG | BODY MASS INDEX: 27.84 KG/M2 | HEART RATE: 74 BPM | TEMPERATURE: 98.2 F | WEIGHT: 194 LBS

## 2024-06-09 DIAGNOSIS — M79.662 PAIN OF LEFT CALF: Primary | ICD-10-CM

## 2024-06-09 PROCEDURE — 99284 EMERGENCY DEPT VISIT MOD MDM: CPT

## 2024-06-09 PROCEDURE — 93971 EXTREMITY STUDY: CPT

## 2024-06-09 ASSESSMENT — PAIN DESCRIPTION - PAIN TYPE: TYPE: ACUTE PAIN

## 2024-06-09 ASSESSMENT — PAIN DESCRIPTION - DESCRIPTORS: DESCRIPTORS: ACHING

## 2024-06-09 ASSESSMENT — PAIN DESCRIPTION - ONSET: ONSET: ON-GOING

## 2024-06-09 ASSESSMENT — PAIN DESCRIPTION - LOCATION: LOCATION: LEG

## 2024-06-09 ASSESSMENT — PAIN SCALES - GENERAL: PAINLEVEL_OUTOF10: 10

## 2024-06-09 ASSESSMENT — PAIN - FUNCTIONAL ASSESSMENT
PAIN_FUNCTIONAL_ASSESSMENT: 0-10
PAIN_FUNCTIONAL_ASSESSMENT: ACTIVITIES ARE NOT PREVENTED

## 2024-06-09 ASSESSMENT — PAIN DESCRIPTION - FREQUENCY: FREQUENCY: CONTINUOUS

## 2024-06-09 ASSESSMENT — PAIN DESCRIPTION - ORIENTATION: ORIENTATION: LEFT

## 2024-06-09 NOTE — DISCHARGE INSTRUCTIONS
You ultrasound is negative for a blood clot today. It does show a fluid collection in the calf which is sometimes a collection of joint fluid from your knee which should go down with time, rest, and compression. If pain does not improve you should follow-up with orthopedics or your primary care.     If you develop any fever, redness of skin, or worsening pain you should return to the ER

## 2024-06-09 NOTE — ED PROVIDER NOTES
GEL Apply topically, Topical, Historical Med      econazole nitrate 1 % cream Apply to effected as needed twice a day., Historical Med      fluocinonide (LIDEX) 0.05 % ointment Apply topically 2 times daily, Topical, 2 TIMES DAILY Starting Fri 1/14/2022, Historical Med             ALLERGIES     Nsaids    FAMILY HISTORY       Family History   Problem Relation Age of Onset    Atrial Fibrillation Sister     Hearing Impairment Sister     Hearing Impairment Brother     Heart Attack Father     Other Mother         CONFUSION AFTER HIP SURG AT AGE 90    Osteoarthritis Mother     Breast Cancer Sister         SOCIAL HISTORY       Social History     Socioeconomic History    Marital status: Single   Tobacco Use    Smoking status: Never     Passive exposure: Never    Smokeless tobacco: Never   Vaping Use    Vaping Use: Never used   Substance and Sexual Activity    Alcohol use: Yes     Alcohol/week: 1.0 standard drink of alcohol     Types: 1 Glasses of wine per week    Drug use: No    Sexual activity: Defer     Birth control/protection: Condom     Social Determinants of Health     Financial Resource Strain: Low Risk  (1/19/2024)    Overall Financial Resource Strain (CARDIA)     Difficulty of Paying Living Expenses: Not hard at all   Food Insecurity: No Food Insecurity (1/19/2024)    Hunger Vital Sign     Worried About Running Out of Food in the Last Year: Never true     Ran Out of Food in the Last Year: Never true   Transportation Needs: Unknown (1/19/2024)    PRAPARE - Transportation     Lack of Transportation (Non-Medical): No   Physical Activity: Sufficiently Active (1/19/2024)    Exercise Vital Sign     Days of Exercise per Week: 7 days     Minutes of Exercise per Session: 150+ min   Housing Stability: Unknown (1/19/2024)    Housing Stability Vital Sign     Unstable Housing in the Last Year: No         PHYSICAL EXAM    (up to 7 for level 4, 8 or more for level 5)   Vitals:   Vitals:    06/09/24 1756   BP: 133/78   Pulse: 74

## 2024-06-09 NOTE — ED TRIAGE NOTES
Pt arrives ambulatory to ED with complaints of left calf swelling and tenderness. Was referred by ortho for DVT rule out. Denies CP, SOB, long car/plane rides.

## 2024-07-02 ENCOUNTER — OFFICE VISIT (OUTPATIENT)
Age: 79
End: 2024-07-02
Payer: MEDICARE

## 2024-07-02 VITALS
HEIGHT: 70 IN | DIASTOLIC BLOOD PRESSURE: 71 MMHG | BODY MASS INDEX: 27.26 KG/M2 | TEMPERATURE: 97.8 F | OXYGEN SATURATION: 97 % | RESPIRATION RATE: 16 BRPM | HEART RATE: 72 BPM | WEIGHT: 190.4 LBS | SYSTOLIC BLOOD PRESSURE: 116 MMHG

## 2024-07-02 DIAGNOSIS — Z01.818 PREOPERATIVE CLEARANCE: Primary | ICD-10-CM

## 2024-07-02 PROCEDURE — G8419 CALC BMI OUT NRM PARAM NOF/U: HCPCS | Performed by: NURSE PRACTITIONER

## 2024-07-02 PROCEDURE — G8427 DOCREV CUR MEDS BY ELIG CLIN: HCPCS | Performed by: NURSE PRACTITIONER

## 2024-07-02 PROCEDURE — 99214 OFFICE O/P EST MOD 30 MIN: CPT | Performed by: NURSE PRACTITIONER

## 2024-07-02 PROCEDURE — 93005 ELECTROCARDIOGRAM TRACING: CPT | Performed by: NURSE PRACTITIONER

## 2024-07-02 PROCEDURE — 1036F TOBACCO NON-USER: CPT | Performed by: NURSE PRACTITIONER

## 2024-07-02 PROCEDURE — 93010 ELECTROCARDIOGRAM REPORT: CPT | Performed by: NURSE PRACTITIONER

## 2024-07-02 PROCEDURE — 1123F ACP DISCUSS/DSCN MKR DOCD: CPT | Performed by: NURSE PRACTITIONER

## 2024-07-02 RX ORDER — PANTOPRAZOLE SODIUM 40 MG/1
40 TABLET, DELAYED RELEASE ORAL
COMMUNITY
Start: 2024-04-05

## 2024-07-02 NOTE — PROGRESS NOTES
Date of Exam: 2024    Danial Rico is a 79 y.o. male (:1945) who presents for preoperative evaluation.   Procedure/Surgery:right knee replacement  Date of Procedure/Surgery: 24  Surgeon: [facility] Facility: [facility]  Primary Physician: James Gunderson MD  Latex Allergy: No    Current Outpatient Medications   Medication Sig Dispense Refill    pantoprazole (PROTONIX) 40 MG tablet Take 1 tablet by mouth      finasteride (PROSCAR) 5 MG tablet TAKE 1/2 TABLET BY MOUTH ONCE DAILY 90 tablet 1    aspirin 81 MG EC tablet Take 1 tablet by mouth daily      diclofenac sodium (VOLTAREN) 1 % GEL Apply topically      ergocalciferol (ERGOCALCIFEROL) 1.25 MG (46585 UT) capsule Take 1 capsule by mouth once a week      econazole nitrate 1 % cream Apply to effected as needed twice a day. (Patient not taking: Reported on 2024)      fluocinonide (LIDEX) 0.05 % ointment Apply topically 2 times daily (Patient not taking: Reported on 2024)       No current facility-administered medications for this visit.        Past Medical History:   Diagnosis Date    Cholelithiasis 10/26/2011    s/p surgery    Edema     right thigh swelling    GERD (gastroesophageal reflux disease)     Painful ejaculation 2015    improved, no obvious etiology    Pure hypercholesterolemia 2016    Seminoma of right testis, stage 1 (HCC)     (testicular cancer)    Testicular cancer (HCC)     Tingling     both upper extremities    Tinnitus of left ear     attributed to anesthesia after orchiectomy surgery    Unspecified adverse effect of anesthesia     TINNITUS-GOT TWICE THE USUAL DOSE FOR ORCHIECTOMY        Past Surgical History:   Procedure Laterality Date    CHOLECYSTECTOMY, LAPAROSCOPIC      COLONOSCOPY  2003    hyperplastic polyp    COLONOSCOPY  2009    normal    COLONOSCOPY  2020    no polyps - performed by Mary Lou Acuña MD at [facility] ENDOSCOPY    HAIR TRANSPLANT  2019

## 2024-07-11 ENCOUNTER — TELEPHONE (OUTPATIENT)
Age: 79
End: 2024-07-11

## 2024-07-16 ENCOUNTER — OFFICE VISIT (OUTPATIENT)
Age: 79
End: 2024-07-16
Payer: MEDICARE

## 2024-07-16 VITALS
OXYGEN SATURATION: 97 % | RESPIRATION RATE: 16 BRPM | HEART RATE: 66 BPM | BODY MASS INDEX: 27.43 KG/M2 | WEIGHT: 191.6 LBS | DIASTOLIC BLOOD PRESSURE: 77 MMHG | HEIGHT: 70 IN | SYSTOLIC BLOOD PRESSURE: 151 MMHG | TEMPERATURE: 98.2 F

## 2024-07-16 DIAGNOSIS — M79.89 RIGHT LEG SWELLING: ICD-10-CM

## 2024-07-16 DIAGNOSIS — R30.0 DYSURIA: Primary | ICD-10-CM

## 2024-07-16 LAB
BILIRUBIN, URINE, POC: NEGATIVE
BLOOD URINE, POC: NEGATIVE
GLUCOSE URINE, POC: NEGATIVE
KETONES, URINE, POC: NEGATIVE
LEUKOCYTE ESTERASE, URINE, POC: NEGATIVE
NITRITE, URINE, POC: NEGATIVE
PH, URINE, POC: 6.5 (ref 4.6–8)
PROTEIN,URINE, POC: NEGATIVE
SPECIFIC GRAVITY, URINE, POC: 1.01 (ref 1–1.03)
URINALYSIS CLARITY, POC: CLEAR
URINALYSIS COLOR, POC: YELLOW
UROBILINOGEN, POC: NORMAL

## 2024-07-16 PROCEDURE — G8427 DOCREV CUR MEDS BY ELIG CLIN: HCPCS | Performed by: INTERNAL MEDICINE

## 2024-07-16 PROCEDURE — PBSHW AMB POC URINALYSIS DIP STICK AUTO W/O MICRO: Performed by: INTERNAL MEDICINE

## 2024-07-16 PROCEDURE — 1036F TOBACCO NON-USER: CPT | Performed by: INTERNAL MEDICINE

## 2024-07-16 PROCEDURE — 81003 URINALYSIS AUTO W/O SCOPE: CPT | Performed by: INTERNAL MEDICINE

## 2024-07-16 PROCEDURE — G8419 CALC BMI OUT NRM PARAM NOF/U: HCPCS | Performed by: INTERNAL MEDICINE

## 2024-07-16 PROCEDURE — 99213 OFFICE O/P EST LOW 20 MIN: CPT | Performed by: INTERNAL MEDICINE

## 2024-07-16 PROCEDURE — 1123F ACP DISCUSS/DSCN MKR DOCD: CPT | Performed by: INTERNAL MEDICINE

## 2024-07-16 RX ORDER — TRAMADOL HYDROCHLORIDE 50 MG/1
50 TABLET ORAL EVERY 6 HOURS PRN
COMMUNITY
Start: 2024-07-13 | End: 2024-07-23

## 2024-07-16 ASSESSMENT — PATIENT HEALTH QUESTIONNAIRE - PHQ9
2. FEELING DOWN, DEPRESSED OR HOPELESS: NOT AT ALL
SUM OF ALL RESPONSES TO PHQ9 QUESTIONS 1 & 2: 0
1. LITTLE INTEREST OR PLEASURE IN DOING THINGS: NOT AT ALL
SUM OF ALL RESPONSES TO PHQ QUESTIONS 1-9: 0

## 2024-07-16 NOTE — PROGRESS NOTES
Yes James Gunderson MD   aspirin 81 MG EC tablet Take 1 tablet by mouth daily   Yes Provider, MD Teresa   diclofenac sodium (VOLTAREN) 1 % GEL Apply topically   Yes Automatic Reconciliation, Ar   econazole nitrate 1 % cream  1/14/22  Yes Automatic Reconciliation, Ar   fluocinonide (LIDEX) 0.05 % ointment Apply topically 2 times daily 1/14/22  Yes Automatic Reconciliation, Ar   ergocalciferol (ERGOCALCIFEROL) 1.25 MG (63553 UT) capsule Take 1 capsule by mouth once a week 4/5/24 6/10/24  Provider, MD Teresa        Review of Systems - per HPI     Physical Exam  Musculoskeletal:      Comments: R knee with bandage over surgical site.  This is clean, dry, and intact.  There is swelling throughout the lower leg and part of the upper leg.  There is bruising on the medial/lower aspect of the thigh        Vitals:    07/16/24 1053 07/16/24 1111   BP: (!) 157/81 (!) 151/77   Pulse: 68 66   Resp: 16    Temp: 98.2 °F (36.8 °C)    TempSrc: Temporal    SpO2: 97%    Weight: 86.9 kg (191 lb 9.6 oz)    Height: 1.778 m (5' 10\")         Advised him to call back or return to office if symptoms worsen/change/persist.  Discussed expected course/resolution/complications of diagnosis in detail with patient.  Medication risks/benefits/costs/interactions/alternatives discussed with patient.    James Gunderson MD

## 2024-08-19 ENCOUNTER — TELEPHONE (OUTPATIENT)
Age: 79
End: 2024-08-19

## 2024-08-19 ENCOUNTER — OFFICE VISIT (OUTPATIENT)
Age: 79
End: 2024-08-19
Payer: MEDICARE

## 2024-08-19 VITALS
DIASTOLIC BLOOD PRESSURE: 67 MMHG | OXYGEN SATURATION: 92 % | HEART RATE: 77 BPM | HEIGHT: 70 IN | WEIGHT: 181.2 LBS | TEMPERATURE: 97.8 F | RESPIRATION RATE: 15 BRPM | SYSTOLIC BLOOD PRESSURE: 109 MMHG | BODY MASS INDEX: 25.94 KG/M2

## 2024-08-19 DIAGNOSIS — M19.90 JOINT INFLAMMATION: ICD-10-CM

## 2024-08-19 DIAGNOSIS — Z96.651 HISTORY OF RIGHT KNEE JOINT REPLACEMENT: ICD-10-CM

## 2024-08-19 DIAGNOSIS — M19.90 JOINT INFLAMMATION: Primary | ICD-10-CM

## 2024-08-19 DIAGNOSIS — R35.0 URINARY FREQUENCY: ICD-10-CM

## 2024-08-19 LAB
APPEARANCE UR: CLEAR
BACTERIA URNS QL MICRO: NEGATIVE /HPF
BASOPHILS # BLD: 0 K/UL (ref 0–0.1)
BASOPHILS NFR BLD: 1 % (ref 0–1)
BILIRUB UR QL: NEGATIVE
COLOR UR: NORMAL
CRP SERPL-MCNC: 0.31 MG/DL (ref 0–0.3)
DIFFERENTIAL METHOD BLD: ABNORMAL
EOSINOPHIL # BLD: 0.3 K/UL (ref 0–0.4)
EOSINOPHIL NFR BLD: 5 % (ref 0–7)
EPITH CASTS URNS QL MICRO: NORMAL /LPF
ERYTHROCYTE [DISTWIDTH] IN BLOOD BY AUTOMATED COUNT: 13.2 % (ref 11.5–14.5)
ERYTHROCYTE [SEDIMENTATION RATE] IN BLOOD: 37 MM/HR (ref 0–20)
GLUCOSE UR STRIP.AUTO-MCNC: NEGATIVE MG/DL
HCT VFR BLD AUTO: 34.9 % (ref 36.6–50.3)
HGB BLD-MCNC: 11.2 G/DL (ref 12.1–17)
HGB UR QL STRIP: NEGATIVE
HYALINE CASTS URNS QL MICRO: NORMAL /LPF (ref 0–5)
IMM GRANULOCYTES # BLD AUTO: 0 K/UL (ref 0–0.04)
IMM GRANULOCYTES NFR BLD AUTO: 0 % (ref 0–0.5)
KETONES UR QL STRIP.AUTO: NEGATIVE MG/DL
LEUKOCYTE ESTERASE UR QL STRIP.AUTO: NEGATIVE
LYMPHOCYTES # BLD: 1.7 K/UL (ref 0.8–3.5)
LYMPHOCYTES NFR BLD: 27 % (ref 12–49)
MCH RBC QN AUTO: 28.8 PG (ref 26–34)
MCHC RBC AUTO-ENTMCNC: 32.1 G/DL (ref 30–36.5)
MCV RBC AUTO: 89.7 FL (ref 80–99)
MONOCYTES # BLD: 0.6 K/UL (ref 0–1)
MONOCYTES NFR BLD: 9 % (ref 5–13)
NEUTS SEG # BLD: 3.7 K/UL (ref 1.8–8)
NEUTS SEG NFR BLD: 58 % (ref 32–75)
NITRITE UR QL STRIP.AUTO: NEGATIVE
NRBC # BLD: 0 K/UL (ref 0–0.01)
NRBC BLD-RTO: 0 PER 100 WBC
PH UR STRIP: 6.5 (ref 5–8)
PLATELET # BLD AUTO: 228 K/UL (ref 150–400)
PMV BLD AUTO: 11 FL (ref 8.9–12.9)
PROT UR STRIP-MCNC: NEGATIVE MG/DL
RBC # BLD AUTO: 3.89 M/UL (ref 4.1–5.7)
RBC #/AREA URNS HPF: NORMAL /HPF (ref 0–5)
SP GR UR REFRACTOMETRY: 1.02 (ref 1–1.03)
UROBILINOGEN UR QL STRIP.AUTO: 0.2 EU/DL (ref 0.2–1)
WBC # BLD AUTO: 6.3 K/UL (ref 4.1–11.1)
WBC URNS QL MICRO: NORMAL /HPF (ref 0–4)

## 2024-08-19 PROCEDURE — 1036F TOBACCO NON-USER: CPT | Performed by: INTERNAL MEDICINE

## 2024-08-19 PROCEDURE — 1123F ACP DISCUSS/DSCN MKR DOCD: CPT | Performed by: INTERNAL MEDICINE

## 2024-08-19 PROCEDURE — G8419 CALC BMI OUT NRM PARAM NOF/U: HCPCS | Performed by: INTERNAL MEDICINE

## 2024-08-19 PROCEDURE — 99214 OFFICE O/P EST MOD 30 MIN: CPT | Performed by: INTERNAL MEDICINE

## 2024-08-19 PROCEDURE — G8427 DOCREV CUR MEDS BY ELIG CLIN: HCPCS | Performed by: INTERNAL MEDICINE

## 2024-08-19 ASSESSMENT — ENCOUNTER SYMPTOMS
ROS SKIN COMMENTS: ITCHING
COUGH: 0
SHORTNESS OF BREATH: 0
ABDOMINAL PAIN: 0

## 2024-08-19 NOTE — TELEPHONE ENCOUNTER
----- Message from Manisha AYERS sent at 8/19/2024  8:37 AM EDT -----  Regarding: ECC Escalation To Practice  ECC Escalation To Practice      Type of Escalation: Red Flag Symptom  --------------------------------------------------------------------------------------------------------------------------    Information for Provider:  Patient is looking for appointment for: Symptom/ fatigue , fever  Reasons for Message: Unable to reach practice and dont want walk in    --------------------------------------------------------------------------------------------------------------------------    Relationship to Patient: Self     Call Back Info: OK to leave message on voicemail/ Please contact the patient as soon as possible  Preferred Call Back Number: Phone  [phone]

## 2024-08-19 NOTE — PROGRESS NOTES
8/19/2024    Danial Rico 1945 is a 79 y.o. year old male established patient,   here for evaluation of the following chief complaint(s):  Chief Complaint   Patient presents with    Fever     COVID negative    Fatigue    Generalized Body Aches           ASSESSMENT/PLAN:  Below is the assessment and plan developed based on review of pertinent history, physical exam, labs, studies, and medications.    1. Joint inflammation  -     CBC with Auto Differential; Future  -     Sedimentation Rate; Future  -     C-Reactive Protein; Future  2. Urinary frequency  -     Urinalysis with Microscopic; Future  -     Culture, Urine; Future  3. History of right knee joint replacement  -     CBC with Auto Differential; Future     Dicussed that some symptoms may be part of normal post op course, some may be medication side effects which he has addressed. Consider infectious etiologies and will workup as above  Follow up with ortho as scheduled    Return if symptoms worsen or fail to improve.       SUBJECTIVE/OBJECTIVE:  Patient reports history of knee replacement July 11th  Has been having a tough recovery  Was initially on tramadol, then hydrocodone- had side effects so weaned off as of 2 days ago  Now taking tylenol 3000mg daily for pain  He has been icing and elevating his R knee at night  Persistent swelling post op but improving with time  Burning sensation on top of left foot and up left ankle, also burning sensation of R foot and leg, which has been going on since surgery, some improvement now  Had US 2 weeks ago that was negative  He reports feeling feverish for a few days- checked temp last night and was 101  He feels a generalized itching/tenderness of skin of all extremities  Has been having urinary frequency since the surgery  Will see ortho on Wednesday  Went swimming for the first time after surgery this weekend  Took a home COVID test today that was negative  No localizing/illness symptoms today other than what

## 2024-08-21 LAB
BACTERIA SPEC CULT: NORMAL
SERVICE CMNT-IMP: NORMAL

## 2024-09-10 ENCOUNTER — OFFICE VISIT (OUTPATIENT)
Age: 79
End: 2024-09-10
Payer: MEDICARE

## 2024-09-10 VITALS
HEIGHT: 70 IN | WEIGHT: 180.4 LBS | TEMPERATURE: 98.1 F | HEART RATE: 85 BPM | OXYGEN SATURATION: 98 % | DIASTOLIC BLOOD PRESSURE: 70 MMHG | RESPIRATION RATE: 16 BRPM | BODY MASS INDEX: 25.83 KG/M2 | SYSTOLIC BLOOD PRESSURE: 133 MMHG

## 2024-09-10 DIAGNOSIS — E55.9 VITAMIN D DEFICIENCY: ICD-10-CM

## 2024-09-10 DIAGNOSIS — R63.8 CRAVING FOR PARTICULAR FOOD: ICD-10-CM

## 2024-09-10 DIAGNOSIS — R63.4 UNINTENDED WEIGHT LOSS: ICD-10-CM

## 2024-09-10 DIAGNOSIS — R68.89 COLD INTOLERANCE: Primary | ICD-10-CM

## 2024-09-10 DIAGNOSIS — R53.83 OTHER FATIGUE: ICD-10-CM

## 2024-09-10 DIAGNOSIS — R70.0 ELEVATED ERYTHROCYTE SEDIMENTATION RATE: ICD-10-CM

## 2024-09-10 DIAGNOSIS — R35.0 FREQUENT URINATION: ICD-10-CM

## 2024-09-10 PROCEDURE — 1036F TOBACCO NON-USER: CPT | Performed by: INTERNAL MEDICINE

## 2024-09-10 PROCEDURE — 99215 OFFICE O/P EST HI 40 MIN: CPT | Performed by: INTERNAL MEDICINE

## 2024-09-10 PROCEDURE — 1123F ACP DISCUSS/DSCN MKR DOCD: CPT | Performed by: INTERNAL MEDICINE

## 2024-09-10 PROCEDURE — G8419 CALC BMI OUT NRM PARAM NOF/U: HCPCS | Performed by: INTERNAL MEDICINE

## 2024-09-10 PROCEDURE — G8427 DOCREV CUR MEDS BY ELIG CLIN: HCPCS | Performed by: INTERNAL MEDICINE

## 2024-09-10 RX ORDER — ACETAMINOPHEN 160 MG
TABLET,DISINTEGRATING ORAL DAILY
COMMUNITY

## 2024-09-10 ASSESSMENT — PATIENT HEALTH QUESTIONNAIRE - PHQ9
1. LITTLE INTEREST OR PLEASURE IN DOING THINGS: NOT AT ALL
SUM OF ALL RESPONSES TO PHQ QUESTIONS 1-9: 0
2. FEELING DOWN, DEPRESSED OR HOPELESS: NOT AT ALL
SUM OF ALL RESPONSES TO PHQ9 QUESTIONS 1 & 2: 0
SUM OF ALL RESPONSES TO PHQ QUESTIONS 1-9: 0

## 2024-09-10 ASSESSMENT — ENCOUNTER SYMPTOMS
NAUSEA: 0
BLOOD IN STOOL: 0
CONSTIPATION: 0
DIARRHEA: 0
COUGH: 0
VOMITING: 0
ABDOMINAL PAIN: 0
WHEEZING: 0
SHORTNESS OF BREATH: 0

## 2024-09-11 LAB
25(OH)D3 SERPL-MCNC: 32.3 NG/ML (ref 30–100)
ALBUMIN SERPL-MCNC: 4 G/DL (ref 3.5–5)
ALBUMIN/GLOB SERPL: 1.3 (ref 1.1–2.2)
ALP SERPL-CCNC: 85 U/L (ref 45–117)
ALT SERPL-CCNC: 18 U/L (ref 12–78)
ANION GAP SERPL CALC-SCNC: 1 MMOL/L (ref 2–12)
AST SERPL-CCNC: 10 U/L (ref 15–37)
BASOPHILS # BLD: 0.1 K/UL (ref 0–0.1)
BASOPHILS NFR BLD: 1 % (ref 0–1)
BILIRUB SERPL-MCNC: 0.8 MG/DL (ref 0.2–1)
BUN SERPL-MCNC: 24 MG/DL (ref 6–20)
BUN/CREAT SERPL: 25 (ref 12–20)
CALCIUM SERPL-MCNC: 9.7 MG/DL (ref 8.5–10.1)
CHLORIDE SERPL-SCNC: 107 MMOL/L (ref 97–108)
CO2 SERPL-SCNC: 32 MMOL/L (ref 21–32)
CREAT SERPL-MCNC: 0.96 MG/DL (ref 0.7–1.3)
DIFFERENTIAL METHOD BLD: ABNORMAL
EOSINOPHIL # BLD: 0.3 K/UL (ref 0–0.4)
EOSINOPHIL NFR BLD: 5 % (ref 0–7)
ERYTHROCYTE [DISTWIDTH] IN BLOOD BY AUTOMATED COUNT: 13.8 % (ref 11.5–14.5)
ERYTHROCYTE [SEDIMENTATION RATE] IN BLOOD: 17 MM/HR (ref 0–20)
GLOBULIN SER CALC-MCNC: 3.1 G/DL (ref 2–4)
GLUCOSE SERPL-MCNC: 102 MG/DL (ref 65–100)
HCT VFR BLD AUTO: 37.7 % (ref 36.6–50.3)
HGB BLD-MCNC: 12 G/DL (ref 12.1–17)
IMM GRANULOCYTES # BLD AUTO: 0 K/UL (ref 0–0.04)
IMM GRANULOCYTES NFR BLD AUTO: 0 % (ref 0–0.5)
LYMPHOCYTES # BLD: 1.6 K/UL (ref 0.8–3.5)
LYMPHOCYTES NFR BLD: 23 % (ref 12–49)
MCH RBC QN AUTO: 28.8 PG (ref 26–34)
MCHC RBC AUTO-ENTMCNC: 31.8 G/DL (ref 30–36.5)
MCV RBC AUTO: 90.4 FL (ref 80–99)
MONOCYTES # BLD: 0.6 K/UL (ref 0–1)
MONOCYTES NFR BLD: 8 % (ref 5–13)
NEUTS SEG # BLD: 4.4 K/UL (ref 1.8–8)
NEUTS SEG NFR BLD: 63 % (ref 32–75)
NRBC # BLD: 0 K/UL (ref 0–0.01)
NRBC BLD-RTO: 0 PER 100 WBC
PLATELET # BLD AUTO: 209 K/UL (ref 150–400)
PMV BLD AUTO: 10.8 FL (ref 8.9–12.9)
POTASSIUM SERPL-SCNC: 4.2 MMOL/L (ref 3.5–5.1)
PROT SERPL-MCNC: 7.1 G/DL (ref 6.4–8.2)
RBC # BLD AUTO: 4.17 M/UL (ref 4.1–5.7)
SODIUM SERPL-SCNC: 140 MMOL/L (ref 136–145)
TSH SERPL DL<=0.05 MIU/L-ACNC: 1.84 UIU/ML (ref 0.36–3.74)
WBC # BLD AUTO: 7 K/UL (ref 4.1–11.1)

## 2024-09-13 DIAGNOSIS — L64.9 MALE PATTERN BALDNESS: Primary | ICD-10-CM

## 2024-09-13 RX ORDER — FINASTERIDE 1 MG/1
1 TABLET, FILM COATED ORAL DAILY
Qty: 90 TABLET | Refills: 3 | Status: SHIPPED | OUTPATIENT
Start: 2024-09-13

## 2024-09-19 ENCOUNTER — HOSPITAL ENCOUNTER (EMERGENCY)
Facility: HOSPITAL | Age: 79
Discharge: HOME | End: 2024-09-19
Attending: STUDENT IN AN ORGANIZED HEALTH CARE EDUCATION/TRAINING PROGRAM
Payer: MEDICARE

## 2024-09-19 VITALS
OXYGEN SATURATION: 98 % | DIASTOLIC BLOOD PRESSURE: 72 MMHG | TEMPERATURE: 98.1 F | BODY MASS INDEX: 24.64 KG/M2 | HEART RATE: 85 BPM | RESPIRATION RATE: 16 BRPM | WEIGHT: 176 LBS | HEIGHT: 71 IN | SYSTOLIC BLOOD PRESSURE: 129 MMHG

## 2024-09-19 DIAGNOSIS — T88.7XXA MEDICATION SIDE EFFECT: Primary | ICD-10-CM

## 2024-09-19 LAB
ALBUMIN SERPL-MCNC: 3.9 G/DL (ref 3.5–5)
ALBUMIN/GLOB SERPL: 1 (ref 1.1–2.2)
ALP SERPL-CCNC: 88 U/L (ref 45–117)
ALT SERPL-CCNC: 16 U/L (ref 12–78)
ANION GAP SERPL CALC-SCNC: 5 MMOL/L (ref 2–12)
APPEARANCE UR: CLEAR
AST SERPL-CCNC: 11 U/L (ref 15–37)
BACTERIA URNS QL MICRO: NEGATIVE /HPF
BASOPHILS # BLD: 0.1 K/UL (ref 0–0.1)
BASOPHILS NFR BLD: 1 % (ref 0–1)
BILIRUB SERPL-MCNC: 0.3 MG/DL (ref 0.2–1)
BILIRUB UR QL: NEGATIVE
BUN SERPL-MCNC: 18 MG/DL (ref 6–20)
BUN/CREAT SERPL: 14 (ref 12–20)
CALCIUM SERPL-MCNC: 9.9 MG/DL (ref 8.5–10.1)
CHLORIDE SERPL-SCNC: 106 MMOL/L (ref 97–108)
CK SERPL-CCNC: 82 U/L (ref 39–308)
CO2 SERPL-SCNC: 27 MMOL/L (ref 21–32)
COLOR UR: NORMAL
COMMENT:: NORMAL
CREAT SERPL-MCNC: 1.26 MG/DL (ref 0.7–1.3)
DIFFERENTIAL METHOD BLD: ABNORMAL
EKG ATRIAL RATE: 83 BPM
EKG DIAGNOSIS: NORMAL
EKG P AXIS: 41 DEGREES
EKG P-R INTERVAL: 156 MS
EKG Q-T INTERVAL: 352 MS
EKG QRS DURATION: 86 MS
EKG QTC CALCULATION (BAZETT): 413 MS
EKG R AXIS: 69 DEGREES
EKG T AXIS: 52 DEGREES
EKG VENTRICULAR RATE: 83 BPM
EOSINOPHIL # BLD: 0.3 K/UL (ref 0–0.4)
EOSINOPHIL NFR BLD: 5 % (ref 0–7)
EPITH CASTS URNS QL MICRO: NORMAL /LPF
ERYTHROCYTE [DISTWIDTH] IN BLOOD BY AUTOMATED COUNT: 13.9 % (ref 11.5–14.5)
FLUAV RNA SPEC QL NAA+PROBE: NOT DETECTED
FLUBV RNA SPEC QL NAA+PROBE: NOT DETECTED
GLOBULIN SER CALC-MCNC: 3.9 G/DL (ref 2–4)
GLUCOSE SERPL-MCNC: 87 MG/DL (ref 65–100)
GLUCOSE UR STRIP.AUTO-MCNC: NEGATIVE MG/DL
HCT VFR BLD AUTO: 39.9 % (ref 36.6–50.3)
HGB BLD-MCNC: 13 G/DL (ref 12.1–17)
HGB UR QL STRIP: NEGATIVE
HYALINE CASTS URNS QL MICRO: NORMAL /LPF (ref 0–5)
IMM GRANULOCYTES # BLD AUTO: 0 K/UL (ref 0–0.04)
IMM GRANULOCYTES NFR BLD AUTO: 1 % (ref 0–0.5)
KETONES UR QL STRIP.AUTO: NEGATIVE MG/DL
LEUKOCYTE ESTERASE UR QL STRIP.AUTO: NEGATIVE
LYMPHOCYTES # BLD: 1.6 K/UL (ref 0.8–3.5)
LYMPHOCYTES NFR BLD: 27 % (ref 12–49)
MCH RBC QN AUTO: 29.2 PG (ref 26–34)
MCHC RBC AUTO-ENTMCNC: 32.6 G/DL (ref 30–36.5)
MCV RBC AUTO: 89.7 FL (ref 80–99)
MONOCYTES # BLD: 0.5 K/UL (ref 0–1)
MONOCYTES NFR BLD: 8 % (ref 5–13)
NEUTS SEG # BLD: 3.6 K/UL (ref 1.8–8)
NEUTS SEG NFR BLD: 58 % (ref 32–75)
NITRITE UR QL STRIP.AUTO: NEGATIVE
NRBC # BLD: 0 K/UL (ref 0–0.01)
NRBC BLD-RTO: 0 PER 100 WBC
PH UR STRIP: 6 (ref 5–8)
PLATELET # BLD AUTO: 227 K/UL (ref 150–400)
PMV BLD AUTO: 10.2 FL (ref 8.9–12.9)
POTASSIUM SERPL-SCNC: 4.6 MMOL/L (ref 3.5–5.1)
PROT SERPL-MCNC: 7.8 G/DL (ref 6.4–8.2)
PROT UR STRIP-MCNC: NEGATIVE MG/DL
RBC # BLD AUTO: 4.45 M/UL (ref 4.1–5.7)
RBC #/AREA URNS HPF: NORMAL /HPF (ref 0–5)
SARS-COV-2 RNA RESP QL NAA+PROBE: NOT DETECTED
SODIUM SERPL-SCNC: 138 MMOL/L (ref 136–145)
SOURCE: NORMAL
SP GR UR REFRACTOMETRY: 1.01 (ref 1–1.03)
SPECIMEN HOLD: NORMAL
SPECIMEN HOLD: NORMAL
UROBILINOGEN UR QL STRIP.AUTO: 0.2 EU/DL (ref 0.2–1)
WBC # BLD AUTO: 6.1 K/UL (ref 4.1–11.1)
WBC URNS QL MICRO: NORMAL /HPF (ref 0–4)

## 2024-09-19 PROCEDURE — 85025 COMPLETE CBC W/AUTO DIFF WBC: CPT

## 2024-09-19 PROCEDURE — 81001 URINALYSIS AUTO W/SCOPE: CPT

## 2024-09-19 PROCEDURE — 93005 ELECTROCARDIOGRAM TRACING: CPT | Performed by: STUDENT IN AN ORGANIZED HEALTH CARE EDUCATION/TRAINING PROGRAM

## 2024-09-19 PROCEDURE — 99284 EMERGENCY DEPT VISIT MOD MDM: CPT

## 2024-09-19 PROCEDURE — 82550 ASSAY OF CK (CPK): CPT

## 2024-09-19 PROCEDURE — 87636 SARSCOV2 & INF A&B AMP PRB: CPT

## 2024-09-19 PROCEDURE — 36415 COLL VENOUS BLD VENIPUNCTURE: CPT

## 2024-09-19 PROCEDURE — 80053 COMPREHEN METABOLIC PANEL: CPT

## 2024-09-19 ASSESSMENT — PAIN DESCRIPTION - PAIN TYPE: TYPE: ACUTE PAIN

## 2024-09-19 ASSESSMENT — PAIN DESCRIPTION - LOCATION: LOCATION: ABDOMEN

## 2024-09-19 ASSESSMENT — PAIN - FUNCTIONAL ASSESSMENT
PAIN_FUNCTIONAL_ASSESSMENT: ACTIVITIES ARE NOT PREVENTED
PAIN_FUNCTIONAL_ASSESSMENT: 0-10

## 2024-09-19 ASSESSMENT — PAIN DESCRIPTION - DESCRIPTORS: DESCRIPTORS: DISCOMFORT

## 2024-09-19 ASSESSMENT — PAIN SCALES - GENERAL: PAINLEVEL_OUTOF10: 8

## 2024-09-19 ASSESSMENT — PAIN DESCRIPTION - ONSET: ONSET: GRADUAL

## 2024-09-19 ASSESSMENT — PAIN DESCRIPTION - FREQUENCY: FREQUENCY: INTERMITTENT

## 2024-09-27 DIAGNOSIS — L64.9 MALE PATTERN BALDNESS: ICD-10-CM

## 2024-09-27 RX ORDER — FINASTERIDE 5 MG/1
5 TABLET, FILM COATED ORAL DAILY
Qty: 90 TABLET | Refills: 1 | Status: SHIPPED | OUTPATIENT
Start: 2024-09-27

## 2024-10-16 ENCOUNTER — OFFICE VISIT (OUTPATIENT)
Age: 79
End: 2024-10-16
Payer: MEDICARE

## 2024-10-16 VITALS
HEIGHT: 71 IN | HEART RATE: 80 BPM | BODY MASS INDEX: 24.92 KG/M2 | SYSTOLIC BLOOD PRESSURE: 140 MMHG | WEIGHT: 178 LBS | DIASTOLIC BLOOD PRESSURE: 68 MMHG | OXYGEN SATURATION: 96 %

## 2024-10-16 DIAGNOSIS — R20.2 PARESTHESIA OF BOTH LOWER EXTREMITIES: Primary | ICD-10-CM

## 2024-10-16 DIAGNOSIS — R29.2 HYPERREFLEXIA OF LOWER EXTREMITY: ICD-10-CM

## 2024-10-16 PROCEDURE — G8427 DOCREV CUR MEDS BY ELIG CLIN: HCPCS | Performed by: PSYCHIATRY & NEUROLOGY

## 2024-10-16 PROCEDURE — G8420 CALC BMI NORM PARAMETERS: HCPCS | Performed by: PSYCHIATRY & NEUROLOGY

## 2024-10-16 PROCEDURE — 1123F ACP DISCUSS/DSCN MKR DOCD: CPT | Performed by: PSYCHIATRY & NEUROLOGY

## 2024-10-16 PROCEDURE — 99215 OFFICE O/P EST HI 40 MIN: CPT | Performed by: PSYCHIATRY & NEUROLOGY

## 2024-10-16 PROCEDURE — G8484 FLU IMMUNIZE NO ADMIN: HCPCS | Performed by: PSYCHIATRY & NEUROLOGY

## 2024-10-16 PROCEDURE — 1036F TOBACCO NON-USER: CPT | Performed by: PSYCHIATRY & NEUROLOGY

## 2024-10-16 NOTE — PROGRESS NOTES
Neurology Clinic Follow up Note    Patient ID:  Danial Rico  [id number]  79 y.o.  1945      Mr. Rico is here for follow up today of  No chief complaint on file.         Last Appointment With Me:  10/18/2022    \"Danial Rico is referred for evaluation of difficulty swallowing, hoarseness. He reports onset of symptoms a few months ago occurring periodically. He has difficulty swallowing solids. This seems to have improved since mentioning symptoms to his PCP. He denies associated dyspnea, generalized weakness, diplopia, ptosis. Barium swallow was recently performed with reported mid/distal esophageal mucosal irregularity, possible esophagitis along with spontaneous gastroesophageal reflux. He is scheduled to have an endoscopy soon for further evaluation of dysphagia. He also reports rather severe fatigue with recent diagnosis of severe sleep apnea. He is followed by sleep medicine for above. He has not started PAP therapy.   He also mentions intermittent tingling into both arms from his shoulders to all fingers x 3 weeks. This occurred after some heavy gardening resulting in pain into both shoulders, acute on chronic for several years. No apparent upper extremity weakness or cervicalgia. Denies similar paresthesias of the lower extremities.\"  Interval History:   Pt returns for delayed f/u after 2 years to discuss paresthesias of the extremities.   August 11-underwent R knee replacement surgery. Reports pain from his right thigh down the RLE to the dorsal R foot. Also endorses numbness and paresthesias throughout the RLE with sparing of the knee. He admits to pain in his LLE involving the top of his L foot and gastrocnemius but much less severe. No LE weakness. Denies associated lower back pain, urinary/bowel retention or incontinence, gait instability. MRI Lumbar spine 4/2024 did not reveal any significant lumbar stenosis.      PMHx/ PSHx/ FHx/ SHx:  Reviewed and unchanged previous visit.   Past Medical

## 2024-10-25 ENCOUNTER — PROCEDURE VISIT (OUTPATIENT)
Age: 79
End: 2024-10-25

## 2024-10-25 DIAGNOSIS — R20.2 PARESTHESIA OF BOTH LOWER EXTREMITIES: Primary | ICD-10-CM

## 2024-10-25 NOTE — PROGRESS NOTES
[facility]  [facility]  [address] [place] [address], [address]  [place] [address]  Phone [phone] Fax [phone]     Test Date:  10/25/2024    Patient: Danial Rico : 1945 Physician: Nehal Arroyo DO   Sex: Male Height: 5' 11\" Ref Phys: Nehal Arroyo,    ID#: [id number] Weight: 178 lbs. Technician: Yordy Camacho LPN     Patient Complaints:  Paresthesias b/l lower extremities R>L    NCV & EMG Findings:  Evaluation of the right Fibular motor nerve showed reduced amplitude (2.2 mV).  All remaining nerves (as indicated in the following tables) were within normal limits.  All left vs. right side differences were within normal limits.      F Wave studies indicate that the left tibial F wave has prolonged latency (62.33 ms).  The right tibial F wave has prolonged latency (69.48 ms).  Left vs. Right comparison data for the tibial F wave indicates abnormal L-R latency difference (7.14 ms).      All examined muscles (as indicated in the following table) showed no evidence of electrical instability.      Impression:  Extensive electrodiagnostic examination of the right lower extremity and additional nerve conduction studies of the left lower extremity reveals the following:    No evidence of a generalized sensorimotor polyneuropathy affecting the lower extremities.    No evidence of a right lumbosacral motor radiculopathy.     ___________________________  Nehal Arroyo DO        Nerve Conduction Studies  Anti Sensory Summary Table     Stim Site NR Peak (ms) Norm Peak (ms) P-T Amp (µV) Norm P-T Amp Site1 Site2 Delta-P (ms) Dist (cm) Fernando (m/s) Norm Fernando (m/s)   Left Sup Fibular Anti Sensory (Ant Lat Mall)  30.8 °C   14 cm    3.5 <4.4 6.6 >5.0 14 cm Ant Lat Mall 3.5 14.0 40 >32   Right Sup Fibular Anti Sensory (Ant Lat Mall)  31 °C   14 cm    2.4 <4.4 9.8 >5.0 14 cm Ant Lat Mall 2.4 14.0 58 >32   Left Sural Anti Sensory (Lat Mall)  30.2 °C

## 2024-11-08 ENCOUNTER — HOSPITAL ENCOUNTER (OUTPATIENT)
Age: 79
Discharge: HOME | End: 2024-11-11
Payer: MEDICARE

## 2024-11-08 DIAGNOSIS — R29.2 HYPERREFLEXIA OF LOWER EXTREMITY: ICD-10-CM

## 2024-11-08 DIAGNOSIS — R20.2 PARESTHESIA OF BOTH LOWER EXTREMITIES: ICD-10-CM

## 2024-11-08 DIAGNOSIS — M75.41 IMPINGEMENT SYNDROME OF RIGHT SHOULDER: ICD-10-CM

## 2024-11-08 PROCEDURE — 72157 MRI CHEST SPINE W/O & W/DYE: CPT

## 2024-11-08 PROCEDURE — 73221 MRI JOINT UPR EXTREM W/O DYE: CPT

## 2024-11-08 PROCEDURE — A9579 GAD-BASE MR CONTRAST NOS,1ML: HCPCS | Performed by: RADIOLOGY

## 2024-11-08 PROCEDURE — 72156 MRI NECK SPINE W/O & W/DYE: CPT

## 2024-11-08 PROCEDURE — 6360000004 HC RX CONTRAST MEDICATION: Performed by: RADIOLOGY

## 2024-11-08 RX ADMIN — GADOTERIDOL 17 ML: 279.3 INJECTION, SOLUTION INTRAVENOUS at 09:44

## 2024-11-15 ENCOUNTER — TELEPHONE (OUTPATIENT)
Age: 79
End: 2024-11-15

## 2024-11-15 NOTE — TELEPHONE ENCOUNTER
Sent the below message via Pt's my chart:    Good morning, hope you are well. Please see the below message from :    \"MRI Cervical spine consistent with severe canal and bilateral foraminal stenosis at C5-6 with minimal cord   compression., severe bilateral foraminal stenosis at C3-4. I advise NSGY referral for ongoing management of cervical stenosis-I will place referral if he elects to proceed.   Thoracic imaging does not reveal any significant stenosis or cord pathology\". RMD     Please let us know if you have any questions at all, and also if you would like to proceed with referral to Neurosurgeon.  Thanks  Roseline

## 2024-11-15 NOTE — TELEPHONE ENCOUNTER
----- Message from Dr. Nehal Arroyo DO sent at 11/14/2024 10:56 AM EST -----  MRI Cervical spine c/w severe canal and bilateral foraminal stenosis at C5-6 with minimal cord  compression., severe b/l foraminal stenosis at C3-4. Advise NSGY referral for ongoing management of cervical stenosis-I will place referral if he elects to proceed. Thoracic imaging does not reveal any significant stenosis or cord pathology. D  ----- Message -----  From: Noam Meyer Incoming Orders Results To Radiant  Sent: 11/13/2024  11:16 PM EST  To: Nehal Arroyo DO

## 2024-11-18 ENCOUNTER — PATIENT MESSAGE (OUTPATIENT)
Age: 79
End: 2024-11-18

## 2024-11-18 ENCOUNTER — TELEPHONE (OUTPATIENT)
Age: 79
End: 2024-11-18

## 2024-11-18 DIAGNOSIS — R29.2 HYPERREFLEXIA OF LOWER EXTREMITY: ICD-10-CM

## 2024-11-18 DIAGNOSIS — R20.2 PARESTHESIA OF BOTH LOWER EXTREMITIES: ICD-10-CM

## 2024-11-18 DIAGNOSIS — M48.02 CERVICAL STENOSIS OF SPINAL CANAL: Primary | ICD-10-CM

## 2024-11-18 NOTE — TELEPHONE ENCOUNTER
Patient would like a call back to discuss the results of his MRI.    Patient received a response via [employer] but still does not fully understand what these results mean.

## 2024-11-18 NOTE — TELEPHONE ENCOUNTER
Called patient. No answer. Left a VM of the results. Then explained in terms he would understand. Also asked if he would like a referral to neurosurgery based off of the results. Stated he can call or send a [employer] message back.

## 2024-11-18 NOTE — TELEPHONE ENCOUNTER
Called patient's home phone. No answer. Left a VM of the referral.   Dr[facility]. Phone number left on VM. Stated to please call his office to schedule an appointment. I will fax over records. Will send a [employer] message as well.

## 2024-12-09 ENCOUNTER — TELEPHONE (OUTPATIENT)
Age: 79
End: 2024-12-09

## 2024-12-17 ENCOUNTER — OFFICE VISIT (OUTPATIENT)
Age: 79
End: 2024-12-17
Payer: MEDICARE

## 2024-12-17 VITALS
HEART RATE: 79 BPM | RESPIRATION RATE: 16 BRPM | WEIGHT: 187.6 LBS | OXYGEN SATURATION: 96 % | SYSTOLIC BLOOD PRESSURE: 127 MMHG | TEMPERATURE: 97.2 F | HEIGHT: 71 IN | DIASTOLIC BLOOD PRESSURE: 74 MMHG | BODY MASS INDEX: 26.26 KG/M2

## 2024-12-17 DIAGNOSIS — R42 LIGHTHEADEDNESS: Primary | ICD-10-CM

## 2024-12-17 DIAGNOSIS — H25.9 AGE-RELATED CATARACT OF BOTH EYES, UNSPECIFIED AGE-RELATED CATARACT TYPE: ICD-10-CM

## 2024-12-17 DIAGNOSIS — R42 VERTIGO: ICD-10-CM

## 2024-12-17 PROCEDURE — 1160F RVW MEDS BY RX/DR IN RCRD: CPT | Performed by: INTERNAL MEDICINE

## 2024-12-17 PROCEDURE — 1036F TOBACCO NON-USER: CPT | Performed by: INTERNAL MEDICINE

## 2024-12-17 PROCEDURE — 99213 OFFICE O/P EST LOW 20 MIN: CPT | Performed by: INTERNAL MEDICINE

## 2024-12-17 PROCEDURE — 1159F MED LIST DOCD IN RCRD: CPT | Performed by: INTERNAL MEDICINE

## 2024-12-17 PROCEDURE — G8427 DOCREV CUR MEDS BY ELIG CLIN: HCPCS | Performed by: INTERNAL MEDICINE

## 2024-12-17 PROCEDURE — G8484 FLU IMMUNIZE NO ADMIN: HCPCS | Performed by: INTERNAL MEDICINE

## 2024-12-17 PROCEDURE — 1125F AMNT PAIN NOTED PAIN PRSNT: CPT | Performed by: INTERNAL MEDICINE

## 2024-12-17 PROCEDURE — G8419 CALC BMI OUT NRM PARAM NOF/U: HCPCS | Performed by: INTERNAL MEDICINE

## 2024-12-17 PROCEDURE — 1123F ACP DISCUSS/DSCN MKR DOCD: CPT | Performed by: INTERNAL MEDICINE

## 2024-12-17 RX ORDER — FINASTERIDE 1 MG/1
0.5 TABLET, FILM COATED ORAL DAILY
COMMUNITY

## 2024-12-17 ASSESSMENT — ENCOUNTER SYMPTOMS
CONSTIPATION: 0
COUGH: 0
SHORTNESS OF BREATH: 0
NAUSEA: 0
ABDOMINAL PAIN: 0
DIARRHEA: 0
VOMITING: 0

## 2024-12-17 ASSESSMENT — PATIENT HEALTH QUESTIONNAIRE - PHQ9
SUM OF ALL RESPONSES TO PHQ QUESTIONS 1-9: 0
1. LITTLE INTEREST OR PLEASURE IN DOING THINGS: NOT AT ALL
2. FEELING DOWN, DEPRESSED OR HOPELESS: NOT AT ALL
SUM OF ALL RESPONSES TO PHQ9 QUESTIONS 1 & 2: 0
SUM OF ALL RESPONSES TO PHQ QUESTIONS 1-9: 0

## 2024-12-17 NOTE — PROGRESS NOTES
Danial Rico is a 79 y.o. male who was seen in clinic today (12/17/2024).    Assessment & Plan:   Below is the assessment and plan developed based on review of pertinent history, physical exam, labs, studies, and medications.  Assessment & Plan  1. Lightheadedness.  New dx, differential reviewed, and favor related to vision issues, given normal physical exam and no recent medication changes. Advised to monitor symptoms, find optimal glasses, avoid frequent eyewear changes, and follow up with eye specialist. Discussed blood worse but will defer until annual physical next month.    2. Vertigo.  New dx, occurred once 2 weeks ago, resolved and not recurred. Unclear etiology and unlikely related to current symptoms.    3. Cataracts  Chronic issue, worsening. Recommended if surgeon is recommending removal then try to schedule this prior to neck or shoulder surgery.       1. Lightheadedness  2. Vertigo  3. Age-related cataract of both eyes, unspecified age-related cataract type       Return if symptoms worsen or fail to improve.   Subjective/Objective:   Danial was seen today for Dizziness     Since last visit: weight has increased.     History of Present Illness  The patient presents with lightheadedness, vertigo, and right leg heaviness.    He had a single vertigo episode 2 weeks ago, lasting less than 10 seconds, with dizziness and mild nausea. Symptoms subsided after lying down. It has not returned.  For the last month he has persistent lightheadedness, affecting his balance but without falls or head trauma. No recent blood work or imaging since 09/2024. No new medications. He reports needing cataract surgery and having trouble with his glasses.    No medication changes except a 3-day antibiotic course post-urological procedure. He had a 2-day cold which resolved. No cognitive impairment or difficulty with tasks. No lightheadedness or dizziness upon standing.    Review of Systems   Respiratory:  Negative for cough

## 2025-01-20 ENCOUNTER — OFFICE VISIT (OUTPATIENT)
Age: 80
End: 2025-01-20
Payer: MEDICARE

## 2025-01-20 VITALS
HEART RATE: 67 BPM | OXYGEN SATURATION: 97 % | RESPIRATION RATE: 16 BRPM | WEIGHT: 190.6 LBS | HEIGHT: 70 IN | BODY MASS INDEX: 27.29 KG/M2 | SYSTOLIC BLOOD PRESSURE: 132 MMHG | DIASTOLIC BLOOD PRESSURE: 76 MMHG | TEMPERATURE: 97 F

## 2025-01-20 DIAGNOSIS — R42 DIZZINESS: ICD-10-CM

## 2025-01-20 DIAGNOSIS — Z71.89 ADVANCED CARE PLANNING/COUNSELING DISCUSSION: ICD-10-CM

## 2025-01-20 DIAGNOSIS — G89.29 CHRONIC RIGHT SHOULDER PAIN: ICD-10-CM

## 2025-01-20 DIAGNOSIS — M25.511 CHRONIC RIGHT SHOULDER PAIN: ICD-10-CM

## 2025-01-20 DIAGNOSIS — Z86.73 HISTORY OF CEREBELLAR STROKE: ICD-10-CM

## 2025-01-20 DIAGNOSIS — G47.39 MIXED SLEEP APNEA: ICD-10-CM

## 2025-01-20 DIAGNOSIS — Z00.00 MEDICARE ANNUAL WELLNESS VISIT, SUBSEQUENT: Primary | ICD-10-CM

## 2025-01-20 DIAGNOSIS — R20.2 NUMBNESS AND TINGLING OF LOWER EXTREMITY: ICD-10-CM

## 2025-01-20 DIAGNOSIS — R20.0 NUMBNESS AND TINGLING OF LOWER EXTREMITY: ICD-10-CM

## 2025-01-20 PROCEDURE — 1125F AMNT PAIN NOTED PAIN PRSNT: CPT | Performed by: INTERNAL MEDICINE

## 2025-01-20 PROCEDURE — 1160F RVW MEDS BY RX/DR IN RCRD: CPT | Performed by: INTERNAL MEDICINE

## 2025-01-20 PROCEDURE — 1159F MED LIST DOCD IN RCRD: CPT | Performed by: INTERNAL MEDICINE

## 2025-01-20 PROCEDURE — 1123F ACP DISCUSS/DSCN MKR DOCD: CPT | Performed by: INTERNAL MEDICINE

## 2025-01-20 PROCEDURE — G0439 PPPS, SUBSEQ VISIT: HCPCS | Performed by: INTERNAL MEDICINE

## 2025-01-20 ASSESSMENT — ENCOUNTER SYMPTOMS
CONSTIPATION: 0
BLOOD IN STOOL: 0
VOMITING: 0
NAUSEA: 0
SHORTNESS OF BREATH: 0
ABDOMINAL PAIN: 0
DIARRHEA: 0
COUGH: 0

## 2025-01-20 ASSESSMENT — PATIENT HEALTH QUESTIONNAIRE - PHQ9
SUM OF ALL RESPONSES TO PHQ QUESTIONS 1-9: 0
SUM OF ALL RESPONSES TO PHQ QUESTIONS 1-9: 0
2. FEELING DOWN, DEPRESSED OR HOPELESS: NOT AT ALL
SUM OF ALL RESPONSES TO PHQ QUESTIONS 1-9: 0
SUM OF ALL RESPONSES TO PHQ QUESTIONS 1-9: 0
SUM OF ALL RESPONSES TO PHQ9 QUESTIONS 1 & 2: 0
1. LITTLE INTEREST OR PLEASURE IN DOING THINGS: NOT AT ALL

## 2025-01-20 ASSESSMENT — LIFESTYLE VARIABLES
HOW MANY STANDARD DRINKS CONTAINING ALCOHOL DO YOU HAVE ON A TYPICAL DAY: PATIENT DOES NOT DRINK
HOW OFTEN DO YOU HAVE A DRINK CONTAINING ALCOHOL: MONTHLY OR LESS

## 2025-01-20 NOTE — ACP (ADVANCE CARE PLANNING)
Advance Care Planning   Advance Care Planning (ACP) Physician/NP/PA (Provider) Conversation    Date of ACP Conversation: 01/20/25  Persons included in Conversation:  patient  Length of ACP Conversation in minutes: <16 minutes (Non-Billable)    We discussed the patient’s choices for care and treatment preferences in case of a health event that adversely affects decision-making abilities or is life-limiting. We discusses the differences between FULL CODE and DNR and when to consider a change in code status.  The limitations with CPR were reviewed.    Has ACP document(s) on file - do NOT reflect the patient's care preferences, patient to provide new document(s).  He elects Full Code (Attempt Resuscitation)    The patient has appointed the following active healthcare agents:    Primary Decision Maker: Gloria Escobar - [facility] - [phone]     James Gunderson MD

## 2025-01-20 NOTE — PROGRESS NOTES
Medicare Annual Wellness Visit    Danial Rico is here for Medicare AWV    Assessment & Plan   Medicare annual wellness visit, subsequent  Advanced care planning/counseling discussion  History of cerebellar stroke  Assessment & Plan:  well controlled, asymptomatic.  BP is well controlled, lipids are above goal.   Continue: current plan pending review of labs.  He didn't tolerate two different statins.  Dizziness  Comments:  chronic issue, unchanged, differential reviewed, and unclear. Likely multifactorial. Will reasess after cataract surgery  Mixed sleep apnea  Assessment & Plan:  Using CPAP. Monitored by specialist- no acute findings meriting change in the plan  Numbness and tingling of lower extremity  Comments:  chronic issue, seeing neurology, will defer to specialist  Chronic right shoulder pain  Comments:  contemplating surgery, he has seen 3 different surgeons, reviewed pros/cons to surgery, will support either decision he makes     Recommendations for Preventive Services Due: see orders and patient instructions/AVS.  Recommended screening schedule for the next 5-10 years is provided to the patient in written form: see Patient Instructions/AVS.     Return in 1 year (on 1/20/2026) for Medicare AWV.       Subjective   Since last visit: weight is stable.  He will be returning later this week for pre-op surgery (rotator cuff and cataracts).    See ACP Note for Advanced Care Directive Discussion    Health Maintenance  Immunizations:   COVID: up to date  Influenza: up to date  RSV: up to date  Tetanus: up to date    Shingles: up to date   Pneumonia: up to date    Cancer screening:     Colon: reviewed guidelines, up to date.    Prostate: reviewed guidelines, followed by urology.       AAA Screening: n/a - never smoked     Review of Systems   Constitutional:  Negative for chills, fatigue and fever.   Respiratory:  Negative for cough and shortness of breath.    Cardiovascular:  Negative for chest pain and

## 2025-01-20 NOTE — ASSESSMENT & PLAN NOTE
well controlled, asymptomatic.  BP is well controlled, lipids are above goal.   Continue: current plan pending review of labs.  He didn't tolerate two different statins.

## 2025-01-21 ENCOUNTER — OFFICE VISIT (OUTPATIENT)
Age: 80
End: 2025-01-21
Payer: MEDICARE

## 2025-01-21 ENCOUNTER — TELEPHONE (OUTPATIENT)
Age: 80
End: 2025-01-21

## 2025-01-21 VITALS
DIASTOLIC BLOOD PRESSURE: 71 MMHG | TEMPERATURE: 97 F | HEART RATE: 75 BPM | BODY MASS INDEX: 27.2 KG/M2 | RESPIRATION RATE: 16 BRPM | HEIGHT: 70 IN | WEIGHT: 190 LBS | SYSTOLIC BLOOD PRESSURE: 130 MMHG | OXYGEN SATURATION: 97 %

## 2025-01-21 DIAGNOSIS — D64.9 ANEMIA, UNSPECIFIED TYPE: ICD-10-CM

## 2025-01-21 DIAGNOSIS — Z01.818 PRE-OP EXAMINATION: ICD-10-CM

## 2025-01-21 DIAGNOSIS — H25.9 AGE-RELATED CATARACT OF BOTH EYES, UNSPECIFIED AGE-RELATED CATARACT TYPE: Primary | ICD-10-CM

## 2025-01-21 LAB
FERRITIN SERPL-MCNC: 52 NG/ML (ref 26–388)
IRON SATN MFR SERPL: 26 % (ref 20–50)
IRON SERPL-MCNC: 74 UG/DL (ref 35–150)
TIBC SERPL-MCNC: 280 UG/DL (ref 250–450)

## 2025-01-21 PROCEDURE — 99213 OFFICE O/P EST LOW 20 MIN: CPT | Performed by: INTERNAL MEDICINE

## 2025-01-21 PROCEDURE — 1126F AMNT PAIN NOTED NONE PRSNT: CPT | Performed by: INTERNAL MEDICINE

## 2025-01-21 PROCEDURE — 1036F TOBACCO NON-USER: CPT | Performed by: INTERNAL MEDICINE

## 2025-01-21 PROCEDURE — 1123F ACP DISCUSS/DSCN MKR DOCD: CPT | Performed by: INTERNAL MEDICINE

## 2025-01-21 PROCEDURE — 1159F MED LIST DOCD IN RCRD: CPT | Performed by: INTERNAL MEDICINE

## 2025-01-21 PROCEDURE — G8427 DOCREV CUR MEDS BY ELIG CLIN: HCPCS | Performed by: INTERNAL MEDICINE

## 2025-01-21 PROCEDURE — 1160F RVW MEDS BY RX/DR IN RCRD: CPT | Performed by: INTERNAL MEDICINE

## 2025-01-21 PROCEDURE — G8419 CALC BMI OUT NRM PARAM NOF/U: HCPCS | Performed by: INTERNAL MEDICINE

## 2025-01-21 SDOH — ECONOMIC STABILITY: FOOD INSECURITY: WITHIN THE PAST 12 MONTHS, THE FOOD YOU BOUGHT JUST DIDN'T LAST AND YOU DIDN'T HAVE MONEY TO GET MORE.: NEVER TRUE

## 2025-01-21 SDOH — ECONOMIC STABILITY: FOOD INSECURITY: WITHIN THE PAST 12 MONTHS, YOU WORRIED THAT YOUR FOOD WOULD RUN OUT BEFORE YOU GOT MONEY TO BUY MORE.: NEVER TRUE

## 2025-01-21 ASSESSMENT — ENCOUNTER SYMPTOMS
COUGH: 0
NAUSEA: 0
BLOOD IN STOOL: 0
SHORTNESS OF BREATH: 0
ABDOMINAL PAIN: 0
CONSTIPATION: 0
VOMITING: 0
DIARRHEA: 0

## 2025-01-21 NOTE — PROGRESS NOTES
Danial Rico is 79 y.o. male (1945) who presents for preoperative evaluation.       Assessment & Plan:   1. Age-related cataract of both eyes, unspecified age-related cataract type  Comments:  symptomatic, no contra-indications to planned surgery, pre-op form completed  2. Pre-op examination  Comments:  no contra-indications to planned surgery, pre-op form completed  3. Anemia, unspecified type  Comments:  Hemoglobin dropped in '24 after surgery, has not rebounded to previous levels, will check iron levels  Orders:  -     Iron and TIBC; Future  -     Ferritin; Future       According to the 2014 ACC/AHA pre-operative risk assessment guidelines Danial Rico is at low risk for major cardiac complications during a low risk procedure (endoscopy, superficial skin, breast, ambulatory, or cataract, etc.) procedure and procedure may proceed as planned.    Medication Recommendations: NONE    Subjective:     Procedure/Surgery: Cataract Removal   Date of Procedure/Surgery: 2/5/25 (R) and 2/12/25 (L)  [facility]  [facility]  [facility]  Primary Physician: James Gunderson MD     Reason for surgery: night time vision    Latex Allergy: No  Recent use of: ASA  Anesthesia Complications: None  History of abnormal bleeding: None  History of Blood Transfusions: no  Health Care Directive or Living Will: yes    EKG: EKG FINDINGS - 9/19/24 reviewed - NSR with non-specific ST changes, unchanged from previous  Labs: see A/P      Pre-Operative Risk Assessment Using 2014 ACC/AHA Guidelines   Emergent procedure: No  Risk Level of Procedure: Low Risk (endoscopy, superficial skin, breast, ambulatory, or cataract, etc.)  Active Cardiac Condition including decompensated HF, Arrhythmia, MI <3 weeks, severe valve disease: No  Revised Cardiac Risk Index Risk factors: None  Measurement of Exercise Tolerance before Surgery is >4 METS (climbing > 1 flight of stairs without

## 2025-01-21 NOTE — TELEPHONE ENCOUNTER
Pt seen today in office for pre op for cataract surgery scheduled 2/5/25 & 2/12/25. Dr Gunderson has completed pre op form and form has been faxed to [phone];  [facility] approved faxed confirmation received. Joleen Jorgensen CMA

## 2025-01-21 NOTE — PROGRESS NOTES
````````                  [address]  [place] [address]  Tel.  [phone]  Fax. [phone] [address]  [place] [address]  Tel.  [phone]  Fax. [phone] [address] [place] [address]  Tel.  [phone]  Fax. [phone]     S>Danial Rico is a 79 y.o. male seen for a positive airway pressure follow-up.  He reports no problems using the device.  The following problems are identified:    Drowsiness no Problems exhaling no   Snoring no Forget to put on no   Mask Comfortable Yes but leaves line on his face (wants to try nasal pillow with chin strap) Can't fall asleep no   Dry Mouth Much improved Mask falls off no   Air Leaking no Frequent awakenings no     Estimated AHI flow from download shows 4.6/hour.   no significant leak  Averaging 8.5/hours use on nights used  Days with usage 100%  Adherence 100%  Weight from last visit 193 lb      He admits that his sleep has improved. He uses a full face mask     Allergies   Allergen Reactions    Nsaids Other (See Comments)     Other Reaction(s): Tinnitus  Not allergic to 1/22/2025    Bromfenac Other (See Comments)       He has a current medication list which includes the following prescription(s): finasteride, aspirin, diclofenac sodium, and econazole nitrate..      He  has a past medical history of Cholelithiasis, Edema, GERD (gastroesophageal reflux disease), Painful ejaculation, Pure hypercholesterolemia, Seminoma of right testis, stage 1 (HCC), Testicular cancer (HCC), Tingling, Tinnitus of left ear, and Unspecified adverse effect of anesthesia.        1/22/2025     9:24 AM 1/21/2024     8:22 AM 1/4/2023     3:29 PM 11/29/2022     8:58 AM 7/28/2022    10:00 AM   Sleep Medicine   Sitting and reading 1 1 0 1 0   Watching TV 1 3 3 2 0   Sitting, inactive in a public place (e.g. a theatre or a meeting) 0 1 0 1 0   As a passenger in a car for an hour without a break 3 2 2 3 3   Lying down to rest in the

## 2025-01-22 ENCOUNTER — OFFICE VISIT (OUTPATIENT)
Age: 80
End: 2025-01-22
Payer: MEDICARE

## 2025-01-22 ENCOUNTER — CLINICAL DOCUMENTATION (OUTPATIENT)
Age: 80
End: 2025-01-22

## 2025-01-22 VITALS
TEMPERATURE: 98.2 F | HEART RATE: 98 BPM | OXYGEN SATURATION: 83 % | WEIGHT: 191 LBS | BODY MASS INDEX: 27.35 KG/M2 | DIASTOLIC BLOOD PRESSURE: 76 MMHG | SYSTOLIC BLOOD PRESSURE: 123 MMHG | HEIGHT: 70 IN

## 2025-01-22 DIAGNOSIS — G47.33 OBSTRUCTIVE SLEEP APNEA (ADULT) (PEDIATRIC): Primary | ICD-10-CM

## 2025-01-22 PROCEDURE — 1160F RVW MEDS BY RX/DR IN RCRD: CPT | Performed by: INTERNAL MEDICINE

## 2025-01-22 PROCEDURE — 1123F ACP DISCUSS/DSCN MKR DOCD: CPT | Performed by: INTERNAL MEDICINE

## 2025-01-22 PROCEDURE — 1159F MED LIST DOCD IN RCRD: CPT | Performed by: INTERNAL MEDICINE

## 2025-01-22 PROCEDURE — 1036F TOBACCO NON-USER: CPT | Performed by: INTERNAL MEDICINE

## 2025-01-22 PROCEDURE — 99213 OFFICE O/P EST LOW 20 MIN: CPT | Performed by: INTERNAL MEDICINE

## 2025-01-22 PROCEDURE — G8419 CALC BMI OUT NRM PARAM NOF/U: HCPCS | Performed by: INTERNAL MEDICINE

## 2025-01-22 PROCEDURE — G8427 DOCREV CUR MEDS BY ELIG CLIN: HCPCS | Performed by: INTERNAL MEDICINE

## 2025-01-22 ASSESSMENT — SLEEP AND FATIGUE QUESTIONNAIRES
HOW LIKELY ARE YOU TO NOD OFF OR FALL ASLEEP WHILE LYING DOWN TO REST IN THE AFTERNOON WHEN CIRCUMSTANCES PERMIT: HIGH CHANCE OF DOZING
HOW LIKELY ARE YOU TO NOD OFF OR FALL ASLEEP WHILE SITTING INACTIVE IN A PUBLIC PLACE: WOULD NEVER DOZE
HOW LIKELY ARE YOU TO NOD OFF OR FALL ASLEEP WHILE SITTING AND TALKING TO SOMEONE: SLIGHT CHANCE OF DOZING
HOW LIKELY ARE YOU TO NOD OFF OR FALL ASLEEP WHEN YOU ARE A PASSENGER IN A CAR FOR AN HOUR WITHOUT A BREAK: HIGH CHANCE OF DOZING
HOW LIKELY ARE YOU TO NOD OFF OR FALL ASLEEP IN A CAR, WHILE STOPPED FOR A FEW MINUTES IN TRAFFIC: WOULD NEVER DOZE
HOW LIKELY ARE YOU TO NOD OFF OR FALL ASLEEP WHILE SITTING QUIETLY AFTER LUNCH WITHOUT ALCOHOL: WOULD NEVER DOZE
HOW LIKELY ARE YOU TO NOD OFF OR FALL ASLEEP WHILE SITTING AND READING: SLIGHT CHANCE OF DOZING
HOW LIKELY ARE YOU TO NOD OFF OR FALL ASLEEP WHILE WATCHING TV: SLIGHT CHANCE OF DOZING
ESS TOTAL SCORE: 9

## 2025-01-22 NOTE — PATIENT INSTRUCTIONS
[address]  [place] [address]  Tel.  [phone]  Fax. [phone] [address]  [place] [address]  Tel.  [phone]  Fax. [phone] [address] [place] [address]  Tel.  [phone]  Fax. [phone]     PROPER SLEEP HYGIENE    What to avoid  Do not have drinks with caffeine, such as coffee or black tea, for 8 hours before bed.  Do not smoke or use other types of tobacco near bedtime. Nicotine is a stimulant and can keep you awake.  Avoid drinking alcohol late in the evening, because it can cause you to wake in the middle of the night.  Do not eat a big meal close to bedtime. If you are hungry, eat a light snack.  Do not drink a lot of water close to bedtime, because the need to urinate may wake you up during the night.  Do not read or watch TV in bed. Use the bed only for sleeping and sexual activity.  What to try  Go to bed at the same time every night, and wake up at the same time every morning. Do not take naps during the day.  Keep your bedroom quiet, dark, and cool.  Get regular exercise, but not within 3 to 4 hours of your bedtime..  Sleep on a comfortable pillow and mattress.  If watching the clock makes you anxious, turn it facing away from you so you cannot see the time.  If you worry when you lie down, start a worry book. Well before bedtime, write down your worries, and then set the book and your concerns aside.  Try meditation or other relaxation techniques before you go to bed.  If you cannot fall asleep, get up and go to another room until you feel sleepy. Do something relaxing. Repeat your bedtime routine before you go to bed again.  Make your house quiet and calm about an hour before bedtime. Turn down the lights, turn off the TV, log off the computer, and turn down the volume on music. This can help you relax after a busy day.    Drowsy Driving  The [place] National Highway Traffic Safety Administration cites drowsiness as a

## 2025-03-18 DIAGNOSIS — Z01.84 ENCOUNTER FOR IMMUNOLOGICAL TEST: Primary | ICD-10-CM

## 2025-05-07 ENCOUNTER — OFFICE VISIT (OUTPATIENT)
Age: 80
End: 2025-05-07
Payer: MEDICARE

## 2025-05-07 VITALS
OXYGEN SATURATION: 95 % | RESPIRATION RATE: 16 BRPM | TEMPERATURE: 97.2 F | HEART RATE: 72 BPM | HEIGHT: 70 IN | BODY MASS INDEX: 27.32 KG/M2 | WEIGHT: 190.8 LBS | SYSTOLIC BLOOD PRESSURE: 119 MMHG | DIASTOLIC BLOOD PRESSURE: 72 MMHG

## 2025-05-07 DIAGNOSIS — M79.652 BILATERAL THIGH PAIN: Primary | ICD-10-CM

## 2025-05-07 DIAGNOSIS — E55.9 VITAMIN D DEFICIENCY: ICD-10-CM

## 2025-05-07 DIAGNOSIS — R41.0 CONFUSION: ICD-10-CM

## 2025-05-07 DIAGNOSIS — R53.82 CHRONIC FATIGUE: ICD-10-CM

## 2025-05-07 DIAGNOSIS — M54.50 ACUTE BILATERAL LOW BACK PAIN WITHOUT SCIATICA: ICD-10-CM

## 2025-05-07 DIAGNOSIS — M79.651 BILATERAL THIGH PAIN: Primary | ICD-10-CM

## 2025-05-07 DIAGNOSIS — R01.1 HEART MURMUR: ICD-10-CM

## 2025-05-07 PROCEDURE — G2211 COMPLEX E/M VISIT ADD ON: HCPCS | Performed by: INTERNAL MEDICINE

## 2025-05-07 PROCEDURE — 1159F MED LIST DOCD IN RCRD: CPT | Performed by: INTERNAL MEDICINE

## 2025-05-07 PROCEDURE — G8427 DOCREV CUR MEDS BY ELIG CLIN: HCPCS | Performed by: INTERNAL MEDICINE

## 2025-05-07 PROCEDURE — 1125F AMNT PAIN NOTED PAIN PRSNT: CPT | Performed by: INTERNAL MEDICINE

## 2025-05-07 PROCEDURE — 1036F TOBACCO NON-USER: CPT | Performed by: INTERNAL MEDICINE

## 2025-05-07 PROCEDURE — 99215 OFFICE O/P EST HI 40 MIN: CPT | Performed by: INTERNAL MEDICINE

## 2025-05-07 PROCEDURE — 1123F ACP DISCUSS/DSCN MKR DOCD: CPT | Performed by: INTERNAL MEDICINE

## 2025-05-07 PROCEDURE — 1160F RVW MEDS BY RX/DR IN RCRD: CPT | Performed by: INTERNAL MEDICINE

## 2025-05-07 PROCEDURE — G8419 CALC BMI OUT NRM PARAM NOF/U: HCPCS | Performed by: INTERNAL MEDICINE

## 2025-05-07 SDOH — ECONOMIC STABILITY: FOOD INSECURITY: WITHIN THE PAST 12 MONTHS, YOU WORRIED THAT YOUR FOOD WOULD RUN OUT BEFORE YOU GOT MONEY TO BUY MORE.: NEVER TRUE

## 2025-05-07 SDOH — ECONOMIC STABILITY: FOOD INSECURITY: WITHIN THE PAST 12 MONTHS, THE FOOD YOU BOUGHT JUST DIDN'T LAST AND YOU DIDN'T HAVE MONEY TO GET MORE.: NEVER TRUE

## 2025-05-07 ASSESSMENT — PATIENT HEALTH QUESTIONNAIRE - PHQ9
SUM OF ALL RESPONSES TO PHQ QUESTIONS 1-9: 0
SUM OF ALL RESPONSES TO PHQ QUESTIONS 1-9: 0
2. FEELING DOWN, DEPRESSED OR HOPELESS: NOT AT ALL
SUM OF ALL RESPONSES TO PHQ QUESTIONS 1-9: 0
1. LITTLE INTEREST OR PLEASURE IN DOING THINGS: NOT AT ALL
SUM OF ALL RESPONSES TO PHQ QUESTIONS 1-9: 0

## 2025-05-07 NOTE — PROGRESS NOTES
Danial Rico is a 80 y.o. male who was seen in clinic today (5/7/2025).      Assessment & Plan:   Below is the assessment and plan developed based on review of pertinent history, physical exam, labs, studies, and medications.  Assessment & Plan  1. Bilateral leg pain.  New diagnosis, differential reviewed, and lLikely residual effect of knee surgery. Favor more compensation for knee pain then anything systemic, inflammatory, or infection. Will check labs. Encouraged him to follow up with ortho. Consider restarting PT. Continue topical treatment and Ibuprofen pending review of labs.    2. Back pain.  New diagnosis, differential reviewed, most likely muscular. Likely compensation for leg and/or knee pain. Consider PT. Continue NSAID's. No indication for imaging at this time.    3. Fatigue.  Chronic issue, unclear if stable or worse. Extensive work up to date without any obvious cause. Will repeat labs. Reassured nothing to suggest anything serious. We did discuss the possibility of low grade depression as there are multiple reasons for this, but he is not sure. Will monitor and reviewed when to consider a low dose medication as a trial.     4. Mental fog/confusion.  Chronic issue, also appears stable but hard to tell. Nothing concrete or abnormal on exam. Did review depression could be impacting this. No treatment as this time but reviewed when to consider low dose medication.    5. Heart murmur.  New dx, heard best at [facility], will get TTE, low suspicious for endocarditis but depending on results might need RONNIE.     Diagnoses/Orders:   1. Bilateral thigh pain  -     CK; Future  2. Acute bilateral low back pain without sciatica  3. Chronic fatigue  -     CBC with Auto Differential; Future  -     Vitamin D 25 Hydroxy; Future  -     C-Reactive Protein; Future  -     Sedimentation Rate; Future  4. Confusion  5. Heart murmur  -     Echo (TTE) complete (PRN contrast/bubble/strain/3D); Future  6. Vitamin D deficiency  -

## 2025-05-08 DIAGNOSIS — M79.651 BILATERAL THIGH PAIN: ICD-10-CM

## 2025-05-08 DIAGNOSIS — M79.652 BILATERAL THIGH PAIN: ICD-10-CM

## 2025-05-08 LAB — CK SERPL-CCNC: 234 U/L (ref 39–308)

## 2025-05-09 ENCOUNTER — RESULTS FOLLOW-UP (OUTPATIENT)
Age: 80
End: 2025-05-09

## 2025-05-09 NOTE — RESULT ENCOUNTER NOTE
Please call [employer].  Multiple labs ordered on 5/7 but only 1 test has returned and doesn't look like the other labs are active. Please inquire why they didn't draw the labs ordered on 5/7 and add them on.

## 2025-05-12 DIAGNOSIS — E55.9 VITAMIN D DEFICIENCY: ICD-10-CM

## 2025-05-12 DIAGNOSIS — Z01.84 ENCOUNTER FOR IMMUNOLOGICAL TEST: ICD-10-CM

## 2025-05-12 DIAGNOSIS — R53.82 CHRONIC FATIGUE: ICD-10-CM

## 2025-05-13 ENCOUNTER — ANCILLARY PROCEDURE (OUTPATIENT)
Age: 80
End: 2025-05-13
Payer: MEDICARE

## 2025-05-13 VITALS
BODY MASS INDEX: 27.2 KG/M2 | WEIGHT: 190 LBS | SYSTOLIC BLOOD PRESSURE: 119 MMHG | HEIGHT: 70 IN | DIASTOLIC BLOOD PRESSURE: 72 MMHG

## 2025-05-13 DIAGNOSIS — R01.1 HEART MURMUR: ICD-10-CM

## 2025-05-13 LAB
25(OH)D3 SERPL-MCNC: 18.2 NG/ML (ref 30–100)
BASOPHILS # BLD: 0.06 K/UL (ref 0–0.1)
BASOPHILS NFR BLD: 1 % (ref 0–1)
CRP SERPL-MCNC: 0.3 MG/DL (ref 0–0.3)
DIFFERENTIAL METHOD BLD: NORMAL
ECHO AO ASC DIAM: 3.4 CM
ECHO AO ASCENDING AORTA INDEX: 1.67 CM/M2
ECHO AO ROOT DIAM: 3.6 CM
ECHO AO ROOT INDEX: 1.76 CM/M2
ECHO AV AREA PEAK VELOCITY: 2.5 CM2
ECHO AV AREA VTI: 2.7 CM2
ECHO AV AREA/BSA PEAK VELOCITY: 1.2 CM2/M2
ECHO AV AREA/BSA VTI: 1.3 CM2/M2
ECHO AV MEAN GRADIENT: 5 MMHG
ECHO AV MEAN VELOCITY: 1.1 M/S
ECHO AV PEAK GRADIENT: 8 MMHG
ECHO AV PEAK VELOCITY: 1.4 M/S
ECHO AV VELOCITY RATIO: 0.64
ECHO AV VTI: 31.5 CM
ECHO BSA: 2.06 M2
ECHO EST RA PRESSURE: 3 MMHG
ECHO LA DIAMETER INDEX: 2.01 CM/M2
ECHO LA DIAMETER: 4.1 CM
ECHO LA TO AORTIC ROOT RATIO: 1.14
ECHO LA VOL A-L A2C: 88 ML (ref 18–58)
ECHO LA VOL A-L A4C: 84 ML (ref 18–58)
ECHO LA VOL BP: 80 ML (ref 18–58)
ECHO LA VOL MOD A2C: 82 ML (ref 18–58)
ECHO LA VOL MOD A4C: 76 ML (ref 18–58)
ECHO LA VOL/BSA BIPLANE: 39 ML/M2 (ref 16–34)
ECHO LA VOLUME AREA LENGTH: 88 ML
ECHO LA VOLUME INDEX A-L A2C: 43 ML/M2 (ref 16–34)
ECHO LA VOLUME INDEX A-L A4C: 41 ML/M2 (ref 16–34)
ECHO LA VOLUME INDEX AREA LENGTH: 43 ML/M2 (ref 16–34)
ECHO LA VOLUME INDEX MOD A2C: 40 ML/M2 (ref 16–34)
ECHO LA VOLUME INDEX MOD A4C: 37 ML/M2 (ref 16–34)
ECHO LV E' LATERAL VELOCITY: 11.69 CM/S
ECHO LV E' SEPTAL VELOCITY: 11.16 CM/S
ECHO LV EDV A2C: 106 ML
ECHO LV EDV A4C: 93 ML
ECHO LV EDV BP: 102 ML (ref 67–155)
ECHO LV EDV INDEX A4C: 46 ML/M2
ECHO LV EDV INDEX BP: 50 ML/M2
ECHO LV EDV NDEX A2C: 52 ML/M2
ECHO LV EF PHYSICIAN: 60 %
ECHO LV EJECTION FRACTION A2C: 52 %
ECHO LV EJECTION FRACTION A4C: 49 %
ECHO LV ESV A2C: 51 ML
ECHO LV ESV A4C: 48 ML
ECHO LV ESV BP: 52 ML (ref 22–58)
ECHO LV ESV INDEX A2C: 25 ML/M2
ECHO LV ESV INDEX A4C: 24 ML/M2
ECHO LV ESV INDEX BP: 25 ML/M2
ECHO LV FRACTIONAL SHORTENING: 32 % (ref 28–44)
ECHO LV INTERNAL DIMENSION DIASTOLE INDEX: 2.45 CM/M2
ECHO LV INTERNAL DIMENSION DIASTOLIC: 5 CM (ref 4.2–5.9)
ECHO LV INTERNAL DIMENSION SYSTOLIC INDEX: 1.67 CM/M2
ECHO LV INTERNAL DIMENSION SYSTOLIC: 3.4 CM
ECHO LV IVSD: 0.7 CM (ref 0.6–1)
ECHO LV IVSS: 1 CM
ECHO LV MASS 2D: 146.8 G (ref 88–224)
ECHO LV MASS INDEX 2D: 72 G/M2 (ref 49–115)
ECHO LV POSTERIOR WALL DIASTOLIC: 1 CM (ref 0.6–1)
ECHO LV POSTERIOR WALL SYSTOLIC: 1.2 CM
ECHO LV RELATIVE WALL THICKNESS RATIO: 0.4
ECHO LVOT AREA: 3.8 CM2
ECHO LVOT AV VTI INDEX: 0.7
ECHO LVOT DIAM: 2.2 CM
ECHO LVOT MEAN GRADIENT: 2 MMHG
ECHO LVOT PEAK GRADIENT: 3 MMHG
ECHO LVOT PEAK VELOCITY: 0.9 M/S
ECHO LVOT STROKE VOLUME INDEX: 41 ML/M2
ECHO LVOT SV: 83.6 ML
ECHO LVOT VTI: 22 CM
ECHO MV A VELOCITY: 0.83 M/S
ECHO MV AREA PHT: 3 CM2
ECHO MV AREA VTI: 3.7 CM2
ECHO MV E DECELERATION TIME (DT): 253.8 MS
ECHO MV E VELOCITY: 0.66 M/S
ECHO MV E/A RATIO: 0.8
ECHO MV E/E' LATERAL: 5.65
ECHO MV E/E' RATIO (AVERAGED): 5.78
ECHO MV E/E' SEPTAL: 5.91
ECHO MV LVOT VTI INDEX: 1.04
ECHO MV MAX VELOCITY: 0.9 M/S
ECHO MV MEAN GRADIENT: 1 MMHG
ECHO MV MEAN VELOCITY: 0.5 M/S
ECHO MV PEAK GRADIENT: 3 MMHG
ECHO MV PRESSURE HALF TIME (PHT): 73.6 MS
ECHO MV VTI: 22.8 CM
ECHO PV MAX VELOCITY: 0.9 M/S
ECHO PV PEAK GRADIENT: 3 MMHG
ECHO RIGHT VENTRICULAR SYSTOLIC PRESSURE (RVSP): 24 MMHG
ECHO RV FREE WALL PEAK S': 17 CM/S
ECHO RV INTERNAL DIMENSION: 4.1 CM
ECHO RV TAPSE: 2.1 CM (ref 1.7–?)
ECHO RVOT PEAK GRADIENT: 1 MMHG
ECHO RVOT PEAK VELOCITY: 0.4 M/S
ECHO TV REGURGITANT MAX VELOCITY: 2.27 M/S
ECHO TV REGURGITANT PEAK GRADIENT: 21 MMHG
EOSINOPHIL # BLD: 0.21 K/UL (ref 0–0.4)
EOSINOPHIL NFR BLD: 3.7 % (ref 0–7)
ERYTHROCYTE [DISTWIDTH] IN BLOOD BY AUTOMATED COUNT: 13.5 % (ref 11.5–14.5)
ERYTHROCYTE [SEDIMENTATION RATE] IN BLOOD: 15 MM/HR (ref 0–20)
HCT VFR BLD AUTO: 39.2 % (ref 36.6–50.3)
HGB BLD-MCNC: 12.8 G/DL (ref 12.1–17)
IMM GRANULOCYTES # BLD AUTO: 0.02 K/UL (ref 0–0.04)
IMM GRANULOCYTES NFR BLD AUTO: 0.3 % (ref 0–0.5)
LYMPHOCYTES # BLD: 1.38 K/UL (ref 0.8–3.5)
LYMPHOCYTES NFR BLD: 24 % (ref 12–49)
MCH RBC QN AUTO: 30.3 PG (ref 26–34)
MCHC RBC AUTO-ENTMCNC: 32.7 G/DL (ref 30–36.5)
MCV RBC AUTO: 92.7 FL (ref 80–99)
MONOCYTES # BLD: 0.5 K/UL (ref 0–1)
MONOCYTES NFR BLD: 8.7 % (ref 5–13)
NEUTS SEG # BLD: 3.58 K/UL (ref 1.8–8)
NEUTS SEG NFR BLD: 62.3 % (ref 32–75)
NRBC # BLD: 0 K/UL (ref 0–0.01)
NRBC BLD-RTO: 0 PER 100 WBC
PLATELET # BLD AUTO: 224 K/UL (ref 150–400)
PMV BLD AUTO: 10.7 FL (ref 8.9–12.9)
RBC # BLD AUTO: 4.23 M/UL (ref 4.1–5.7)
WBC # BLD AUTO: 5.8 K/UL (ref 4.1–11.1)

## 2025-05-13 PROCEDURE — 93306 TTE W/DOPPLER COMPLETE: CPT | Performed by: INTERNAL MEDICINE

## 2025-05-14 LAB
MEV IGG SER IA-ACNC: 69.4 AU/ML
MUV IGG SER IA-ACNC: 65.6 AU/ML
RUBV IGG SERPL IA-ACNC: >33 INDEX

## 2025-05-27 ENCOUNTER — HOSPITAL ENCOUNTER (OUTPATIENT)
Facility: HOSPITAL | Age: 80
Discharge: HOME | End: 2025-05-30
Attending: INTERNAL MEDICINE
Payer: MEDICARE

## 2025-05-27 ENCOUNTER — OFFICE VISIT (OUTPATIENT)
Age: 80
End: 2025-05-27
Payer: MEDICARE

## 2025-05-27 VITALS
TEMPERATURE: 97.7 F | HEIGHT: 70 IN | WEIGHT: 187.4 LBS | DIASTOLIC BLOOD PRESSURE: 69 MMHG | HEART RATE: 83 BPM | OXYGEN SATURATION: 100 % | RESPIRATION RATE: 16 BRPM | BODY MASS INDEX: 26.83 KG/M2 | SYSTOLIC BLOOD PRESSURE: 119 MMHG

## 2025-05-27 DIAGNOSIS — R10.32 LLQ ABDOMINAL PAIN: ICD-10-CM

## 2025-05-27 DIAGNOSIS — R10.32 LLQ ABDOMINAL PAIN: Primary | ICD-10-CM

## 2025-05-27 PROCEDURE — 1159F MED LIST DOCD IN RCRD: CPT | Performed by: INTERNAL MEDICINE

## 2025-05-27 PROCEDURE — 1036F TOBACCO NON-USER: CPT | Performed by: INTERNAL MEDICINE

## 2025-05-27 PROCEDURE — 99213 OFFICE O/P EST LOW 20 MIN: CPT | Performed by: INTERNAL MEDICINE

## 2025-05-27 PROCEDURE — 1160F RVW MEDS BY RX/DR IN RCRD: CPT | Performed by: INTERNAL MEDICINE

## 2025-05-27 PROCEDURE — 2500000003 HC RX 250 WO HCPCS: Performed by: INTERNAL MEDICINE

## 2025-05-27 PROCEDURE — 6360000004 HC RX CONTRAST MEDICATION: Performed by: INTERNAL MEDICINE

## 2025-05-27 PROCEDURE — 1123F ACP DISCUSS/DSCN MKR DOCD: CPT | Performed by: INTERNAL MEDICINE

## 2025-05-27 PROCEDURE — G8427 DOCREV CUR MEDS BY ELIG CLIN: HCPCS | Performed by: INTERNAL MEDICINE

## 2025-05-27 PROCEDURE — 74177 CT ABD & PELVIS W/CONTRAST: CPT

## 2025-05-27 PROCEDURE — G8419 CALC BMI OUT NRM PARAM NOF/U: HCPCS | Performed by: INTERNAL MEDICINE

## 2025-05-27 PROCEDURE — 1125F AMNT PAIN NOTED PAIN PRSNT: CPT | Performed by: INTERNAL MEDICINE

## 2025-05-27 RX ORDER — IOPAMIDOL 755 MG/ML
100 INJECTION, SOLUTION INTRAVASCULAR
Status: COMPLETED | OUTPATIENT
Start: 2025-05-27 | End: 2025-05-27

## 2025-05-27 RX ADMIN — IOPAMIDOL 100 ML: 755 INJECTION, SOLUTION INTRAVENOUS at 17:49

## 2025-05-27 RX ADMIN — BARIUM SULFATE 900 ML: 20 SUSPENSION ORAL at 17:49

## 2025-05-27 SDOH — ECONOMIC STABILITY: FOOD INSECURITY: WITHIN THE PAST 12 MONTHS, YOU WORRIED THAT YOUR FOOD WOULD RUN OUT BEFORE YOU GOT MONEY TO BUY MORE.: NEVER TRUE

## 2025-05-27 SDOH — ECONOMIC STABILITY: FOOD INSECURITY: WITHIN THE PAST 12 MONTHS, THE FOOD YOU BOUGHT JUST DIDN'T LAST AND YOU DIDN'T HAVE MONEY TO GET MORE.: NEVER TRUE

## 2025-05-27 ASSESSMENT — PATIENT HEALTH QUESTIONNAIRE - PHQ9
SUM OF ALL RESPONSES TO PHQ QUESTIONS 1-9: 0
2. FEELING DOWN, DEPRESSED OR HOPELESS: NOT AT ALL
SUM OF ALL RESPONSES TO PHQ QUESTIONS 1-9: 0
1. LITTLE INTEREST OR PLEASURE IN DOING THINGS: NOT AT ALL

## 2025-05-27 NOTE — PROGRESS NOTES
Danial Rico is a 80 y.o. male who was seen in clinic today (5/27/2025).      Assessment & Plan:   Below is the assessment and plan developed based on review of pertinent history, physical exam, labs, studies, and medications.  Assessment & Plan  Left lower quadrant abdominal pain.  New diagnisis, differential reviewed, and favor diverticulitis. CT scan ordered. Recommend pain management with NSAIDs or Tylenol until imaging results are available. Reviewed antibiotics if positive. Imaging scheduled for this afternoon.     Diagnoses/Orders:   1. LLQ abdominal pain  -     CT ABDOMEN PELVIS W IV CONTRAST Additional Contrast? Oral; Future     Return if symptoms worsen or fail to improve.   Subjective/Objective:   Danial was seen today for Abdominal Pain     Since last visit: weight is stable.     History of Present Illness  The patient presents with left lower quadrant abdominal pain.    He reports severe fatigue, requiring 14 hours of sleep yesterday, and a dragging sensation today. He has left-sided abdominal pain and constipation, atypical for him as he usually has several bowel movements per day. Constipation has persisted for a few days despite using Colace, Metamucil, and a disposable enema, which provided partial relief. He had regular bowel movements before this episode, with no dietary changes or new medications. This is the first significant instance of constipation. Despite medications, bowel movements have been minimal. Pain is described as fullness and sharpness, primarily on the left side extending to the middle, with tenderness upon palpation. He reports mild chills and a sensation of feverishness but no current fever, nausea, or vomiting. Stools are small and pellet-like, resembling rabbit droppings, with no blood or black, tarry stools. He also reports difficulty urinating.    The patient had a colonoscopy in 2020 with no polyps. Imaging of the abdomen in 2023 showed diverticulosis in the

## 2025-05-28 ENCOUNTER — TELEPHONE (OUTPATIENT)
Age: 80
End: 2025-05-28

## 2025-05-28 ENCOUNTER — RESULTS FOLLOW-UP (OUTPATIENT)
Age: 80
End: 2025-05-28

## 2025-05-28 DIAGNOSIS — K57.20 COLONIC DIVERTICULAR ABSCESS: Primary | ICD-10-CM

## 2025-05-28 DIAGNOSIS — K86.2 PANCREATIC CYST: ICD-10-CM

## 2025-05-28 RX ORDER — CIPROFLOXACIN 500 MG/1
500 TABLET, FILM COATED ORAL 2 TIMES DAILY
Qty: 20 TABLET | Refills: 0 | Status: SHIPPED | OUTPATIENT
Start: 2025-05-28 | End: 2025-06-07

## 2025-05-28 RX ORDER — METRONIDAZOLE 500 MG/1
500 TABLET ORAL 3 TIMES DAILY
Qty: 30 TABLET | Refills: 0 | Status: SHIPPED | OUTPATIENT
Start: 2025-05-28 | End: 2025-06-07

## 2025-05-28 NOTE — TELEPHONE ENCOUNTER
Results reviewed.  Patient called and notified about CT scan results.    1. Diverticulitis and likely abscess.  Recommended admission to hospital for IV abx and surgical consult. Did review need for IR drainage.  He was very hesitant to be admitted to the hospital.  He reports pain is better.  He is aware of the risks of outpatient treatment with inpatient treatment and he is willing to take those risks.  Oral abx will be sent in (cipro and flagyl).  Red flags and expectations reviewed when/if to go to the ER.  Will plan on repeat CT scan in 5 days.  Order placed.  2. Pancreatic cysts.  Reviewed mass vs cyst.  Will get MRI to better characterize findings.

## 2025-05-28 NOTE — TELEPHONE ENCOUNTER
Reason for call:  Spoke with pt.  Pt is requesting for PCP to send the result of the CT done yesterday by fax to Dr. Acuña Fax [phone].     Is this a new problem: Yes    Date of last appointment:  5/27/2025     Can we respond via [employer]: No    Best call back number: Danial Rico   [phone]

## 2025-05-28 NOTE — RESULT ENCOUNTER NOTE
Results reviewed.  Patient called and notified about results.  1. Diverticulitis and likely abscess.  Recommended admission to hospital for IV abx and surgical consult. Did review need for IR drainage.  He was very hesitant to be admitted to the hospital.  He reports pain is better.  He is aware of the risks of outpatient treatment with inpatient treatment and he is willing to take those risks.  Oral abx will be sent in.  Red flags and expectations reviewed when/if to go to the ER.  Will plan on repeat CT scan in 5 days.  2. Pancreatic cysts.  Reviewed mass vs cyst.  Will get MRI to better characterize findings.

## 2025-05-28 NOTE — TELEPHONE ENCOUNTER
Patient had requested that his CT results be faxed to Dr Acuña for his records.   Confirmation received.

## 2025-05-30 ENCOUNTER — TRANSCRIBE ORDERS (OUTPATIENT)
Facility: HOSPITAL | Age: 80
End: 2025-05-30

## 2025-05-30 DIAGNOSIS — M47.816 LUMBAR SPONDYLOSIS: Primary | ICD-10-CM

## 2025-05-30 DIAGNOSIS — M54.16 LUMBAR RADICULOPATHY: ICD-10-CM

## 2025-06-02 ENCOUNTER — HOSPITAL ENCOUNTER (OUTPATIENT)
Facility: HOSPITAL | Age: 80
Discharge: HOME | End: 2025-06-05
Attending: INTERNAL MEDICINE
Payer: MEDICARE

## 2025-06-02 DIAGNOSIS — K57.20 COLONIC DIVERTICULAR ABSCESS: ICD-10-CM

## 2025-06-02 LAB — CREAT BLD-MCNC: 1.2 MG/DL (ref 0.6–1.3)

## 2025-06-02 PROCEDURE — 82565 ASSAY OF CREATININE: CPT

## 2025-06-02 PROCEDURE — 6360000004 HC RX CONTRAST MEDICATION: Performed by: INTERNAL MEDICINE

## 2025-06-02 PROCEDURE — 74177 CT ABD & PELVIS W/CONTRAST: CPT

## 2025-06-02 RX ORDER — IOPAMIDOL 755 MG/ML
100 INJECTION, SOLUTION INTRAVASCULAR
Status: COMPLETED | OUTPATIENT
Start: 2025-06-02 | End: 2025-06-02

## 2025-06-02 RX ADMIN — IOPAMIDOL 100 ML: 755 INJECTION, SOLUTION INTRAVENOUS at 09:20

## 2025-06-03 ENCOUNTER — RESULTS FOLLOW-UP (OUTPATIENT)
Age: 80
End: 2025-06-03

## 2025-06-13 ENCOUNTER — HOSPITAL ENCOUNTER (OUTPATIENT)
Age: 80
Discharge: HOME | End: 2025-06-16
Payer: MEDICARE

## 2025-06-13 DIAGNOSIS — K86.2 PANCREATIC CYST: ICD-10-CM

## 2025-06-13 PROCEDURE — 6360000004 HC RX CONTRAST MEDICATION: Performed by: RADIOLOGY

## 2025-06-13 PROCEDURE — A9579 GAD-BASE MR CONTRAST NOS,1ML: HCPCS | Performed by: RADIOLOGY

## 2025-06-13 PROCEDURE — 74183 MRI ABD W/O CNTR FLWD CNTR: CPT

## 2025-06-13 RX ORDER — GADOTERIDOL 279.3 MG/ML
15 INJECTION INTRAVENOUS
Status: COMPLETED | OUTPATIENT
Start: 2025-06-13 | End: 2025-06-13

## 2025-06-13 RX ADMIN — GADOTERIDOL 15 ML: 279.3 INJECTION, SOLUTION INTRAVENOUS at 15:10

## 2025-06-16 ENCOUNTER — HOSPITAL ENCOUNTER (OUTPATIENT)
Age: 80
Discharge: HOME | End: 2025-06-19
Payer: MEDICARE

## 2025-06-16 DIAGNOSIS — M54.16 LUMBAR RADICULOPATHY: ICD-10-CM

## 2025-06-16 DIAGNOSIS — M47.816 LUMBAR SPONDYLOSIS: ICD-10-CM

## 2025-06-16 PROCEDURE — 72148 MRI LUMBAR SPINE W/O DYE: CPT

## 2025-06-18 DIAGNOSIS — R53.82 CHRONIC FATIGUE: Primary | ICD-10-CM

## 2025-06-19 DIAGNOSIS — R53.82 CHRONIC FATIGUE: ICD-10-CM

## 2025-06-19 DIAGNOSIS — R91.8 ABNORMAL CT SCAN OF LUNG: Primary | ICD-10-CM

## 2025-06-20 LAB — VIT B12 SERPL-MCNC: 227 PG/ML (ref 193–986)

## 2025-06-22 ENCOUNTER — RESULTS FOLLOW-UP (OUTPATIENT)
Age: 80
End: 2025-06-22

## 2025-06-22 DIAGNOSIS — E53.8 B12 DEFICIENCY: Primary | ICD-10-CM

## 2025-06-22 RX ORDER — CYANOCOBALAMIN 1000 UG/ML
1000 INJECTION, SOLUTION INTRAMUSCULAR; SUBCUTANEOUS
Qty: 10 ML | Refills: 1 | Status: SHIPPED | OUTPATIENT
Start: 2025-06-22

## 2025-06-24 ENCOUNTER — CLINICAL SUPPORT (OUTPATIENT)
Age: 80
End: 2025-06-24
Payer: MEDICARE

## 2025-06-24 DIAGNOSIS — E53.8 B12 DEFICIENCY: Primary | ICD-10-CM

## 2025-06-24 PROCEDURE — PBSHW PBB SHADOW CHARGE: Performed by: INTERNAL MEDICINE

## 2025-06-24 PROCEDURE — 96372 THER/PROPH/DIAG INJ SC/IM: CPT | Performed by: INTERNAL MEDICINE

## 2025-06-24 RX ORDER — CYANOCOBALAMIN 1000 UG/ML
1000 INJECTION, SOLUTION INTRAMUSCULAR; SUBCUTANEOUS ONCE
Status: COMPLETED | OUTPATIENT
Start: 2025-06-24 | End: 2025-06-24

## 2025-06-24 RX ADMIN — CYANOCOBALAMIN 1000 MCG: 1000 INJECTION INTRAMUSCULAR; SUBCUTANEOUS at 15:05

## 2025-06-24 NOTE — PROGRESS NOTES
Pt came in today for NV for a Vit B12 inj which pt provides medications per provider approval. Pt was given inj in right deltoid and tolerated procedure well.     Manufacture: [employer]  NDC #29739-088-62  LOT #[id number]  Exp 2026/08

## 2025-06-26 ENCOUNTER — HOSPITAL ENCOUNTER (OUTPATIENT)
Age: 80
Discharge: HOME | End: 2025-06-29
Payer: MEDICARE

## 2025-06-26 DIAGNOSIS — R91.8 ABNORMAL CT SCAN OF LUNG: ICD-10-CM

## 2025-06-26 PROCEDURE — 71250 CT THORAX DX C-: CPT

## 2025-06-27 ENCOUNTER — RESULTS FOLLOW-UP (OUTPATIENT)
Age: 80
End: 2025-06-27

## 2025-07-01 ENCOUNTER — CLINICAL SUPPORT (OUTPATIENT)
Age: 80
End: 2025-07-01
Payer: MEDICARE

## 2025-07-01 DIAGNOSIS — E53.8 B12 DEFICIENCY: Primary | ICD-10-CM

## 2025-07-01 PROCEDURE — PBSHW PBB SHADOW CHARGE: Performed by: INTERNAL MEDICINE

## 2025-07-01 PROCEDURE — 96372 THER/PROPH/DIAG INJ SC/IM: CPT | Performed by: INTERNAL MEDICINE

## 2025-07-01 RX ORDER — CYANOCOBALAMIN 1000 UG/ML
1000 INJECTION, SOLUTION INTRAMUSCULAR; SUBCUTANEOUS ONCE
Status: COMPLETED | OUTPATIENT
Start: 2025-07-01 | End: 2025-07-01

## 2025-07-01 RX ADMIN — CYANOCOBALAMIN 1000 MCG: 1000 INJECTION INTRAMUSCULAR; SUBCUTANEOUS at 15:55

## 2025-07-08 ENCOUNTER — CLINICAL SUPPORT (OUTPATIENT)
Age: 80
End: 2025-07-08
Payer: MEDICARE

## 2025-07-08 DIAGNOSIS — E53.8 B12 DEFICIENCY: Primary | ICD-10-CM

## 2025-07-08 PROCEDURE — 96372 THER/PROPH/DIAG INJ SC/IM: CPT | Performed by: INTERNAL MEDICINE

## 2025-07-08 PROCEDURE — PBSHW PBB SHADOW CHARGE: Performed by: INTERNAL MEDICINE

## 2025-07-08 RX ORDER — CYANOCOBALAMIN 1000 UG/ML
1000 INJECTION, SOLUTION INTRAMUSCULAR; SUBCUTANEOUS ONCE
Status: COMPLETED | OUTPATIENT
Start: 2025-07-08 | End: 2025-07-08

## 2025-07-08 RX ADMIN — CYANOCOBALAMIN 1000 MCG: 1000 INJECTION INTRAMUSCULAR; SUBCUTANEOUS at 15:15

## 2025-07-08 NOTE — PROGRESS NOTES
Pt came in today for NV for a Vit B12 inj which pt provides medications per provider approval. Pt was given inj in Left deltoid and tolerated procedure well.     Manufacture: [employer]  NDC #59871-678-08  LOT #[id number]  Exp 2026/08

## 2025-07-11 PROBLEM — E53.8 B12 DEFICIENCY: Status: ACTIVE | Noted: 2025-07-11

## 2025-07-15 ENCOUNTER — CLINICAL SUPPORT (OUTPATIENT)
Age: 80
End: 2025-07-15
Payer: MEDICARE

## 2025-07-15 DIAGNOSIS — E53.8 B12 DEFICIENCY: Primary | ICD-10-CM

## 2025-07-15 PROCEDURE — PBSHW PBB SHADOW CHARGE: Performed by: INTERNAL MEDICINE

## 2025-07-15 PROCEDURE — 96372 THER/PROPH/DIAG INJ SC/IM: CPT | Performed by: INTERNAL MEDICINE

## 2025-07-15 RX ORDER — CYANOCOBALAMIN 1000 UG/ML
1000 INJECTION, SOLUTION INTRAMUSCULAR; SUBCUTANEOUS ONCE
Status: COMPLETED | OUTPATIENT
Start: 2025-07-15 | End: 2025-07-15

## 2025-07-15 RX ADMIN — CYANOCOBALAMIN 1000 MCG: 1000 INJECTION INTRAMUSCULAR; SUBCUTANEOUS at 15:15

## 2025-07-15 NOTE — PROGRESS NOTES
Pt came in today for NV for a Vit B12 inj which pt provides medications per provider approval. Pt was given inj in Left deltoid and tolerated procedure well.     Manufacture: [employer]  NDC #70103-713-53  LOT #[id number]  Exp 2026/08

## 2025-07-17 DIAGNOSIS — R53.82 CHRONIC FATIGUE: ICD-10-CM

## 2025-07-17 DIAGNOSIS — R42 DIZZINESS: Primary | ICD-10-CM

## 2025-07-22 ENCOUNTER — CLINICAL SUPPORT (OUTPATIENT)
Age: 80
End: 2025-07-22
Payer: MEDICARE

## 2025-07-22 DIAGNOSIS — E53.8 B12 DEFICIENCY: Primary | ICD-10-CM

## 2025-07-22 PROCEDURE — PBSHW PBB SHADOW CHARGE: Performed by: INTERNAL MEDICINE

## 2025-07-22 PROCEDURE — 96372 THER/PROPH/DIAG INJ SC/IM: CPT | Performed by: INTERNAL MEDICINE

## 2025-07-22 RX ORDER — CYANOCOBALAMIN 1000 UG/ML
1000 INJECTION, SOLUTION INTRAMUSCULAR; SUBCUTANEOUS ONCE
Status: COMPLETED | OUTPATIENT
Start: 2025-07-22 | End: 2025-07-22

## 2025-07-22 RX ADMIN — CYANOCOBALAMIN 1000 MCG: 1000 INJECTION INTRAMUSCULAR; SUBCUTANEOUS at 15:37

## 2025-07-22 NOTE — PROGRESS NOTES
Pt came in today for NV for a Vit B12 inj which pt provides medications per provider approval. Pt was given inj in Left deltoid and tolerated procedure well.     Manufacture: [employer]  NDC #32190-520-24  LOT #[id number]  Exp 2026/08

## 2025-07-29 ENCOUNTER — CLINICAL SUPPORT (OUTPATIENT)
Age: 80
End: 2025-07-29
Payer: MEDICARE

## 2025-07-29 ENCOUNTER — APPOINTMENT (OUTPATIENT)
Age: 80
End: 2025-07-29
Payer: MEDICARE

## 2025-07-29 DIAGNOSIS — E53.8 B12 DEFICIENCY: Primary | ICD-10-CM

## 2025-07-29 PROCEDURE — PBSHW PBB SHADOW CHARGE: Performed by: INTERNAL MEDICINE

## 2025-07-29 PROCEDURE — 96372 THER/PROPH/DIAG INJ SC/IM: CPT | Performed by: INTERNAL MEDICINE

## 2025-07-29 RX ORDER — CYANOCOBALAMIN 1000 UG/ML
1000 INJECTION, SOLUTION INTRAMUSCULAR; SUBCUTANEOUS ONCE
Status: COMPLETED | OUTPATIENT
Start: 2025-07-29 | End: 2025-07-29

## 2025-07-29 RX ADMIN — CYANOCOBALAMIN 1000 MCG: 1000 INJECTION INTRAMUSCULAR; SUBCUTANEOUS at 15:20

## 2025-07-29 NOTE — PROGRESS NOTES
Pt came in today for NV for a Vit B12 inj which pt provides medications per provider approval. Pt was given inj in Left deltoid and tolerated procedure well.     Manufacture: [employer]  NDC #60592-506-79  LOT #[id number]  Exp 2026/08

## 2025-07-30 ENCOUNTER — OFFICE VISIT (OUTPATIENT)
Age: 80
End: 2025-07-30
Payer: MEDICARE

## 2025-07-30 VITALS
BODY MASS INDEX: 27.4 KG/M2 | DIASTOLIC BLOOD PRESSURE: 65 MMHG | OXYGEN SATURATION: 95 % | TEMPERATURE: 97.3 F | HEIGHT: 70 IN | SYSTOLIC BLOOD PRESSURE: 109 MMHG | HEART RATE: 63 BPM | RESPIRATION RATE: 16 BRPM | WEIGHT: 191.4 LBS

## 2025-07-30 DIAGNOSIS — U07.1 COVID-19: Primary | ICD-10-CM

## 2025-07-30 DIAGNOSIS — R05.1 ACUTE COUGH: ICD-10-CM

## 2025-07-30 DIAGNOSIS — R09.89 RUNNY NOSE: ICD-10-CM

## 2025-07-30 LAB
EXP DATE SOLUTION: ABNORMAL
EXP DATE SWAB: ABNORMAL
EXPIRATION DATE: ABNORMAL
INFLUENZA A RNA, POC: ABNORMAL
INFLUENZA B RNA, POC: ABNORMAL
LOT NUMBER POC: ABNORMAL
LOT NUMBER SOLUTION: ABNORMAL
LOT NUMBER SWAB: ABNORMAL
SARS-COV-2 RNA, POC: POSITIVE
VALID INTERNAL CONTROL: YES

## 2025-07-30 PROCEDURE — PBSHW AMB POC COVID-19 & INFLUENZA A/B: Performed by: NURSE PRACTITIONER

## 2025-07-30 PROCEDURE — G8419 CALC BMI OUT NRM PARAM NOF/U: HCPCS | Performed by: NURSE PRACTITIONER

## 2025-07-30 PROCEDURE — 87636 SARSCOV2 & INF A&B AMP PRB: CPT | Performed by: NURSE PRACTITIONER

## 2025-07-30 PROCEDURE — 1123F ACP DISCUSS/DSCN MKR DOCD: CPT | Performed by: NURSE PRACTITIONER

## 2025-07-30 PROCEDURE — 1126F AMNT PAIN NOTED NONE PRSNT: CPT | Performed by: NURSE PRACTITIONER

## 2025-07-30 PROCEDURE — 99213 OFFICE O/P EST LOW 20 MIN: CPT | Performed by: NURSE PRACTITIONER

## 2025-07-30 PROCEDURE — G8427 DOCREV CUR MEDS BY ELIG CLIN: HCPCS | Performed by: NURSE PRACTITIONER

## 2025-07-30 PROCEDURE — 1036F TOBACCO NON-USER: CPT | Performed by: NURSE PRACTITIONER

## 2025-07-30 PROCEDURE — 1160F RVW MEDS BY RX/DR IN RCRD: CPT | Performed by: NURSE PRACTITIONER

## 2025-07-30 PROCEDURE — 1159F MED LIST DOCD IN RCRD: CPT | Performed by: NURSE PRACTITIONER

## 2025-07-30 ASSESSMENT — ENCOUNTER SYMPTOMS: RHINORRHEA: 1

## 2025-07-30 NOTE — PROGRESS NOTES
Danial Rico (:  1945) is a 80 y.o. male,here for evaluation of the following chief complaint(s):  Other (Pt here to discussed cold symptoms)    /65   Pulse 63   Temp 97.3 °F (36.3 °C) (Oral)   Resp 16   Ht 1.778 m (5' 10\")   Wt 86.8 kg (191 lb 6.4 oz)   SpO2 95%   BMI 27.46 kg/m²       SUBJECTIVE/OBJECTIVE:    HPI:Patient reports runny nose, headache, sore throat, and body aches started 3 days ago. No known fever. He has taken ibuprofen.    Review of Systems   HENT:  Positive for congestion and rhinorrhea.    Musculoskeletal:  Positive for myalgias.       Physical Exam  Constitutional:       Appearance: Normal appearance.   HENT:      Head: Normocephalic.      Right Ear: Tympanic membrane, ear canal and external ear normal.      Left Ear: Tympanic membrane, ear canal and external ear normal.      Nose: Congestion and rhinorrhea present.      Mouth/Throat:      Pharynx: Posterior oropharyngeal erythema present.   Cardiovascular:      Rate and Rhythm: Normal rate and regular rhythm.   Pulmonary:      Effort: Pulmonary effort is normal.      Breath sounds: Normal breath sounds.   Skin:     General: Skin is warm.   Neurological:      General: No focal deficit present.      Mental Status: He is alert and oriented to person, place, and time.   Psychiatric:         Mood and Affect: Mood normal.         Behavior: Behavior normal.      Results for orders placed or performed in visit on 25   AMB POC COVID-19 & INFLUENZA A/B   Result Value Ref Range    SARS-COV-2 RNA, POC Positive     INFLUENZA A RNA, POC Not-Detected     INFLUENZA B RNA, POC Not-Detected     Internal Control yes     Lot number swab [id number]     EXP date swab 2029     LOT NUMBER POC [id number]     EXPIRATION DATE 2026     Lot number solution [id number]     EXP date solution 2027        ASSESSMENT/PLAN:  1. COVID-19  2. Runny nose  -     AMB POC COVID-19 & INFLUENZA A/B  3. Acute cough  -     AMB POC COVID-19 &

## 2025-08-05 ENCOUNTER — CLINICAL SUPPORT (OUTPATIENT)
Age: 80
End: 2025-08-05
Payer: MEDICARE

## 2025-08-05 DIAGNOSIS — E53.8 B12 DEFICIENCY: Primary | ICD-10-CM

## 2025-08-05 PROCEDURE — 96372 THER/PROPH/DIAG INJ SC/IM: CPT | Performed by: INTERNAL MEDICINE

## 2025-08-05 PROCEDURE — PBSHW PBB SHADOW CHARGE: Performed by: INTERNAL MEDICINE

## 2025-08-05 RX ORDER — CYANOCOBALAMIN 1000 UG/ML
1000 INJECTION, SOLUTION INTRAMUSCULAR; SUBCUTANEOUS ONCE
Status: COMPLETED | OUTPATIENT
Start: 2025-08-05 | End: 2025-08-05

## 2025-08-05 RX ADMIN — CYANOCOBALAMIN 1000 MCG: 1000 INJECTION INTRAMUSCULAR; SUBCUTANEOUS at 15:48

## 2025-08-12 ENCOUNTER — CLINICAL SUPPORT (OUTPATIENT)
Age: 80
End: 2025-08-12
Payer: MEDICARE

## 2025-08-12 DIAGNOSIS — E53.8 B12 DEFICIENCY: Primary | ICD-10-CM

## 2025-08-12 PROCEDURE — PBSHW PBB SHADOW CHARGE: Performed by: INTERNAL MEDICINE

## 2025-08-12 PROCEDURE — 96372 THER/PROPH/DIAG INJ SC/IM: CPT | Performed by: INTERNAL MEDICINE

## 2025-08-12 RX ORDER — CYANOCOBALAMIN 1000 UG/ML
1000 INJECTION, SOLUTION INTRAMUSCULAR; SUBCUTANEOUS ONCE
Status: COMPLETED | OUTPATIENT
Start: 2025-08-12 | End: 2025-08-12

## 2025-08-12 RX ADMIN — CYANOCOBALAMIN 1000 MCG: 1000 INJECTION INTRAMUSCULAR; SUBCUTANEOUS at 15:28

## 2025-08-14 ENCOUNTER — OFFICE VISIT (OUTPATIENT)
Age: 80
End: 2025-08-14
Payer: MEDICARE

## 2025-08-14 VITALS
SYSTOLIC BLOOD PRESSURE: 124 MMHG | WEIGHT: 185 LBS | HEIGHT: 70 IN | BODY MASS INDEX: 26.48 KG/M2 | OXYGEN SATURATION: 98 % | RESPIRATION RATE: 16 BRPM | DIASTOLIC BLOOD PRESSURE: 78 MMHG | HEART RATE: 87 BPM

## 2025-08-14 DIAGNOSIS — M48.02 CERVICAL STENOSIS OF SPINAL CANAL: Primary | ICD-10-CM

## 2025-08-14 DIAGNOSIS — M48.02 FORAMINAL STENOSIS OF CERVICAL REGION: ICD-10-CM

## 2025-08-14 PROCEDURE — G8419 CALC BMI OUT NRM PARAM NOF/U: HCPCS

## 2025-08-14 PROCEDURE — 99215 OFFICE O/P EST HI 40 MIN: CPT

## 2025-08-14 PROCEDURE — G8427 DOCREV CUR MEDS BY ELIG CLIN: HCPCS

## 2025-08-14 PROCEDURE — 1123F ACP DISCUSS/DSCN MKR DOCD: CPT

## 2025-08-14 PROCEDURE — 1159F MED LIST DOCD IN RCRD: CPT

## 2025-08-14 PROCEDURE — 1160F RVW MEDS BY RX/DR IN RCRD: CPT

## 2025-08-14 PROCEDURE — 1036F TOBACCO NON-USER: CPT

## 2025-08-14 ASSESSMENT — ENCOUNTER SYMPTOMS
TROUBLE SWALLOWING: 0
VOICE CHANGE: 0
PHOTOPHOBIA: 0

## 2025-08-14 ASSESSMENT — PATIENT HEALTH QUESTIONNAIRE - PHQ9
1. LITTLE INTEREST OR PLEASURE IN DOING THINGS: NOT AT ALL
SUM OF ALL RESPONSES TO PHQ QUESTIONS 1-9: 0
SUM OF ALL RESPONSES TO PHQ QUESTIONS 1-9: 0
2. FEELING DOWN, DEPRESSED OR HOPELESS: NOT AT ALL
SUM OF ALL RESPONSES TO PHQ QUESTIONS 1-9: 0
SUM OF ALL RESPONSES TO PHQ QUESTIONS 1-9: 0

## 2025-08-14 ASSESSMENT — VISUAL ACUITY: VA_NORMAL: 1

## 2025-08-19 ENCOUNTER — TELEPHONE (OUTPATIENT)
Age: 80
End: 2025-08-19

## 2025-08-19 ENCOUNTER — CLINICAL SUPPORT (OUTPATIENT)
Age: 80
End: 2025-08-19
Payer: MEDICARE

## 2025-08-19 VITALS — WEIGHT: 191.2 LBS | BODY MASS INDEX: 27.43 KG/M2

## 2025-08-19 DIAGNOSIS — E53.8 B12 DEFICIENCY: Primary | ICD-10-CM

## 2025-08-19 PROCEDURE — 96372 THER/PROPH/DIAG INJ SC/IM: CPT | Performed by: INTERNAL MEDICINE

## 2025-08-19 PROCEDURE — PBSHW PBB SHADOW CHARGE: Performed by: INTERNAL MEDICINE

## 2025-08-19 RX ORDER — CYANOCOBALAMIN 1000 UG/ML
1000 INJECTION, SOLUTION INTRAMUSCULAR; SUBCUTANEOUS ONCE
Status: COMPLETED | OUTPATIENT
Start: 2025-08-19 | End: 2025-08-19

## 2025-08-19 RX ADMIN — CYANOCOBALAMIN 1000 MCG: 1000 INJECTION INTRAMUSCULAR; SUBCUTANEOUS at 14:27

## 2025-08-26 ENCOUNTER — CLINICAL SUPPORT (OUTPATIENT)
Age: 80
End: 2025-08-26
Payer: MEDICARE

## 2025-08-26 DIAGNOSIS — E53.8 B12 DEFICIENCY: Primary | ICD-10-CM

## 2025-08-26 PROCEDURE — PBSHW PBB SHADOW CHARGE: Performed by: INTERNAL MEDICINE

## 2025-08-26 PROCEDURE — 96372 THER/PROPH/DIAG INJ SC/IM: CPT | Performed by: INTERNAL MEDICINE

## 2025-08-26 RX ORDER — CYANOCOBALAMIN 1000 UG/ML
1000 INJECTION, SOLUTION INTRAMUSCULAR; SUBCUTANEOUS ONCE
Status: COMPLETED | OUTPATIENT
Start: 2025-08-26 | End: 2025-08-26

## 2025-08-26 RX ADMIN — CYANOCOBALAMIN 1000 MCG: 1000 INJECTION INTRAMUSCULAR; SUBCUTANEOUS at 14:14

## 2025-09-02 ENCOUNTER — APPOINTMENT (OUTPATIENT)
Age: 80
End: 2025-09-02
Payer: MEDICARE

## 2025-09-02 ENCOUNTER — OFFICE VISIT (OUTPATIENT)
Age: 80
End: 2025-09-02
Payer: MEDICARE

## 2025-09-02 ENCOUNTER — CLINICAL DOCUMENTATION (OUTPATIENT)
Age: 80
End: 2025-09-02

## 2025-09-02 VITALS
DIASTOLIC BLOOD PRESSURE: 77 MMHG | TEMPERATURE: 97.2 F | SYSTOLIC BLOOD PRESSURE: 122 MMHG | WEIGHT: 188.8 LBS | BODY MASS INDEX: 27.03 KG/M2 | HEART RATE: 74 BPM | RESPIRATION RATE: 16 BRPM | OXYGEN SATURATION: 97 % | HEIGHT: 70 IN

## 2025-09-02 DIAGNOSIS — E53.8 B12 DEFICIENCY: Primary | ICD-10-CM

## 2025-09-02 DIAGNOSIS — I83.811 VARICOSE VEINS OF RIGHT LOWER EXTREMITY WITH PAIN: ICD-10-CM

## 2025-09-02 DIAGNOSIS — E55.9 VITAMIN D DEFICIENCY: ICD-10-CM

## 2025-09-02 DIAGNOSIS — M54.50 CHRONIC MIDLINE LOW BACK PAIN WITHOUT SCIATICA: ICD-10-CM

## 2025-09-02 DIAGNOSIS — G89.29 CHRONIC MIDLINE LOW BACK PAIN WITHOUT SCIATICA: ICD-10-CM

## 2025-09-02 PROCEDURE — G8427 DOCREV CUR MEDS BY ELIG CLIN: HCPCS | Performed by: INTERNAL MEDICINE

## 2025-09-02 PROCEDURE — 99214 OFFICE O/P EST MOD 30 MIN: CPT | Performed by: INTERNAL MEDICINE

## 2025-09-02 PROCEDURE — 1159F MED LIST DOCD IN RCRD: CPT | Performed by: INTERNAL MEDICINE

## 2025-09-02 PROCEDURE — 1123F ACP DISCUSS/DSCN MKR DOCD: CPT | Performed by: INTERNAL MEDICINE

## 2025-09-02 PROCEDURE — 1125F AMNT PAIN NOTED PAIN PRSNT: CPT | Performed by: INTERNAL MEDICINE

## 2025-09-02 PROCEDURE — G8419 CALC BMI OUT NRM PARAM NOF/U: HCPCS | Performed by: INTERNAL MEDICINE

## 2025-09-02 PROCEDURE — 1036F TOBACCO NON-USER: CPT | Performed by: INTERNAL MEDICINE

## 2025-09-02 RX ORDER — DULOXETIN HYDROCHLORIDE 30 MG/1
30 CAPSULE, DELAYED RELEASE ORAL DAILY
Qty: 30 CAPSULE | Refills: 0 | Status: SHIPPED | OUTPATIENT
Start: 2025-09-02

## 2025-09-02 ASSESSMENT — PATIENT HEALTH QUESTIONNAIRE - PHQ9
1. LITTLE INTEREST OR PLEASURE IN DOING THINGS: NOT AT ALL
SUM OF ALL RESPONSES TO PHQ QUESTIONS 1-9: 0
SUM OF ALL RESPONSES TO PHQ QUESTIONS 1-9: 0
2. FEELING DOWN, DEPRESSED OR HOPELESS: NOT AT ALL
SUM OF ALL RESPONSES TO PHQ QUESTIONS 1-9: 0
SUM OF ALL RESPONSES TO PHQ QUESTIONS 1-9: 0

## (undated) DEVICE — TUBING HYDR IRR --

## (undated) DEVICE — FORCEPS BX L240CM JAW DIA2.4MM ORNG L CAP W/ NDL DISP RAD